# Patient Record
Sex: FEMALE | Race: WHITE | Employment: PART TIME | URBAN - METROPOLITAN AREA
[De-identification: names, ages, dates, MRNs, and addresses within clinical notes are randomized per-mention and may not be internally consistent; named-entity substitution may affect disease eponyms.]

---

## 2018-01-23 ENCOUNTER — OFFICE VISIT (OUTPATIENT)
Dept: ENDOCRINOLOGY | Age: 63
End: 2018-01-23

## 2018-01-23 VITALS
WEIGHT: 201.8 LBS | HEART RATE: 81 BPM | HEIGHT: 63 IN | DIASTOLIC BLOOD PRESSURE: 84 MMHG | BODY MASS INDEX: 35.75 KG/M2 | OXYGEN SATURATION: 98 % | SYSTOLIC BLOOD PRESSURE: 136 MMHG

## 2018-01-23 DIAGNOSIS — E55.9 VITAMIN D DEFICIENCY: ICD-10-CM

## 2018-01-23 DIAGNOSIS — N18.30 CKD (CHRONIC KIDNEY DISEASE) STAGE 3, GFR 30-59 ML/MIN (HCC): ICD-10-CM

## 2018-01-23 DIAGNOSIS — E66.9 OBESITY (BMI 35.0-39.9 WITHOUT COMORBIDITY): ICD-10-CM

## 2018-01-23 DIAGNOSIS — I10 ESSENTIAL HYPERTENSION: ICD-10-CM

## 2018-01-23 DIAGNOSIS — E78.2 MIXED HYPERLIPIDEMIA: ICD-10-CM

## 2018-01-23 PROCEDURE — 99214 OFFICE O/P EST MOD 30 MIN: CPT | Performed by: INTERNAL MEDICINE

## 2018-01-23 RX ORDER — PRAVASTATIN SODIUM 20 MG
20 TABLET ORAL DAILY
COMMUNITY
End: 2018-01-23

## 2018-01-23 RX ORDER — LATANOPROST 50 UG/ML
SOLUTION/ DROPS OPHTHALMIC
COMMUNITY
End: 2021-06-22

## 2018-01-23 RX ORDER — CHLORAL HYDRATE 500 MG
3000 CAPSULE ORAL 2 TIMES DAILY
COMMUNITY
End: 2018-04-24

## 2018-01-23 ASSESSMENT — PATIENT HEALTH QUESTIONNAIRE - PHQ9
1. LITTLE INTEREST OR PLEASURE IN DOING THINGS: 0
2. FEELING DOWN, DEPRESSED OR HOPELESS: 0
SUM OF ALL RESPONSES TO PHQ QUESTIONS 1-9: 0
SUM OF ALL RESPONSES TO PHQ9 QUESTIONS 1 & 2: 0

## 2018-01-23 NOTE — PROGRESS NOTES
patient. Past Surgical History:   Procedure Laterality Date    COLONOSCOPY       Social History     Social History    Marital status:      Spouse name: N/A    Number of children: N/A    Years of education: N/A     Occupational History    Not on file. Social History Main Topics    Smoking status: Never Smoker    Smokeless tobacco: Never Used    Alcohol use No    Drug use: No    Sexual activity: No     Other Topics Concern    Not on file     Social History Narrative    No narrative on file     Family History   Problem Relation Age of Onset    Diabetes Mother     Heart Attack Mother     Cancer Father     No Known Problems Daughter     No Known Problems Daughter      Current Outpatient Prescriptions   Medication Sig Dispense Refill    Dulaglutide 0.75 MG/0.5ML SOPN Inject 0.75 mg into the skin once a week      vitamin D (CHOLECALCIFEROL) 5000 units CAPS capsule Take 5,000 Units by mouth daily      metoprolol tartrate (LOPRESSOR) 25 MG tablet Take 25 mg by mouth daily      pravastatin (PRAVACHOL) 20 MG tablet Take 20 mg by mouth daily      latanoprost (XALATAN) 0.005 % ophthalmic solution place 1 Drop into each eye daily.  Omega-3 Fatty Acids (FISH OIL) 1000 MG CAPS Take 3,000 mg by mouth 2 times daily      insulin aspart (NOVOLOG) 100 UNIT/ML injection vial Inject 60 Units into the skin 1 Day Inject up to 60 units subcutaneously daily via insulin pump. 1 vial 3     No current facility-administered medications for this visit.       Allergies   Allergen Reactions    Ace Inhibitors      Other reaction(s): Cough     Family Status   Relation Status    Mother     Father     Daughter Alive    Daughter Alive       Review of Systems  Constitutional: no fatigue, no fever, no recent weight gain, no recent weight loss, no changes in appetite  Eyes: no eye pain, no change in vision, no eye redness, no eye irritation, no double vision  Ears, nose, throat: no nasal

## 2018-01-27 LAB
ALBUMIN SERPL-MCNC: 3 G/DL
ALP BLD-CCNC: 80 U/L
ALT SERPL-CCNC: 19 U/L
AST SERPL-CCNC: 14 U/L
AVERAGE GLUCOSE: ABNORMAL
BILIRUB SERPL-MCNC: 0.2 MG/DL (ref 0.1–1.4)
BUN BLDV-MCNC: 37 MG/DL
CALCIUM SERPL-MCNC: 8.3 MG/DL
CHLORIDE BLD-SCNC: 110 MMOL/L
CHOLESTEROL, TOTAL: 189 MG/DL
CHOLESTEROL/HDL RATIO: ABNORMAL
CO2: 23 MMOL/L
CREAT SERPL-MCNC: 3.21 MG/DL
GLUCOSE FASTING: ABNORMAL MG/DL
HBA1C MFR BLD: 7.1 %
HDLC SERPL-MCNC: 42 MG/DL (ref 35–70)
LDL CHOLESTEROL CALCULATED: 113 MG/DL (ref 0–160)
POTASSIUM SERPL-SCNC: 4.9 MMOL/L
PTH INTACT: 183
SODIUM BLD-SCNC: 142 MMOL/L
TOTAL PROTEIN: 6.4 G/DL (ref 6.4–8.2)
TRIGL SERPL-MCNC: 172 MG/DL
VITAMIN D 25-HYDROXY: 47
VITAMIN D2, 25 HYDROXY: NORMAL
VITAMIN D3,25 HYDROXY: NORMAL
VLDLC SERPL CALC-MCNC: ABNORMAL MG/DL

## 2018-04-24 ENCOUNTER — OFFICE VISIT (OUTPATIENT)
Dept: ENDOCRINOLOGY | Age: 63
End: 2018-04-24

## 2018-04-24 VITALS
WEIGHT: 197.2 LBS | BODY MASS INDEX: 36.29 KG/M2 | HEART RATE: 78 BPM | HEIGHT: 62 IN | SYSTOLIC BLOOD PRESSURE: 148 MMHG | DIASTOLIC BLOOD PRESSURE: 70 MMHG | OXYGEN SATURATION: 96 %

## 2018-04-24 DIAGNOSIS — E55.9 VITAMIN D DEFICIENCY: ICD-10-CM

## 2018-04-24 DIAGNOSIS — E66.9 OBESITY (BMI 35.0-39.9 WITHOUT COMORBIDITY): ICD-10-CM

## 2018-04-24 DIAGNOSIS — E78.2 MIXED HYPERLIPIDEMIA: ICD-10-CM

## 2018-04-24 DIAGNOSIS — N18.30 CKD (CHRONIC KIDNEY DISEASE) STAGE 3, GFR 30-59 ML/MIN (HCC): ICD-10-CM

## 2018-04-24 DIAGNOSIS — I10 ESSENTIAL HYPERTENSION: ICD-10-CM

## 2018-04-24 PROCEDURE — 99214 OFFICE O/P EST MOD 30 MIN: CPT | Performed by: INTERNAL MEDICINE

## 2018-04-24 RX ORDER — METOPROLOL TARTRATE 50 MG/1
50 TABLET, FILM COATED ORAL 2 TIMES DAILY
COMMUNITY

## 2018-04-24 RX ORDER — CHLORAL HYDRATE 500 MG
1000 CAPSULE ORAL 2 TIMES DAILY
Qty: 90 CAPSULE | Refills: 3 | COMMUNITY
Start: 2018-04-24 | End: 2019-04-09 | Stop reason: ALTCHOICE

## 2018-04-24 RX ORDER — AMLODIPINE BESYLATE 5 MG/1
5 TABLET ORAL DAILY
COMMUNITY

## 2018-04-24 ASSESSMENT — PATIENT HEALTH QUESTIONNAIRE - PHQ9
SUM OF ALL RESPONSES TO PHQ9 QUESTIONS 1 & 2: 0
SUM OF ALL RESPONSES TO PHQ QUESTIONS 1-9: 0
2. FEELING DOWN, DEPRESSED OR HOPELESS: 0
1. LITTLE INTEREST OR PLEASURE IN DOING THINGS: 0

## 2018-07-16 RX ORDER — DULAGLUTIDE 0.75 MG/.5ML
INJECTION, SOLUTION SUBCUTANEOUS
Qty: 6 ML | Refills: 0 | Status: SHIPPED | OUTPATIENT
Start: 2018-07-16 | End: 2018-10-02 | Stop reason: SDUPTHER

## 2018-07-25 ENCOUNTER — OFFICE VISIT (OUTPATIENT)
Dept: ENDOCRINOLOGY | Age: 63
End: 2018-07-25

## 2018-07-25 VITALS
HEIGHT: 62 IN | HEART RATE: 93 BPM | DIASTOLIC BLOOD PRESSURE: 62 MMHG | BODY MASS INDEX: 37.1 KG/M2 | SYSTOLIC BLOOD PRESSURE: 125 MMHG | WEIGHT: 201.6 LBS | OXYGEN SATURATION: 98 %

## 2018-07-25 DIAGNOSIS — E78.2 MIXED HYPERLIPIDEMIA: ICD-10-CM

## 2018-07-25 DIAGNOSIS — E55.9 VITAMIN D DEFICIENCY: ICD-10-CM

## 2018-07-25 DIAGNOSIS — N18.4 CKD (CHRONIC KIDNEY DISEASE) STAGE 4, GFR 15-29 ML/MIN (HCC): ICD-10-CM

## 2018-07-25 DIAGNOSIS — E66.9 OBESITY (BMI 35.0-39.9 WITHOUT COMORBIDITY): ICD-10-CM

## 2018-07-25 DIAGNOSIS — I10 ESSENTIAL HYPERTENSION: ICD-10-CM

## 2018-07-25 PROCEDURE — 99214 OFFICE O/P EST MOD 30 MIN: CPT | Performed by: INTERNAL MEDICINE

## 2018-07-25 RX ORDER — PARICALCITOL 1 UG/1
1 CAPSULE, LIQUID FILLED ORAL
COMMUNITY
Start: 2018-07-12 | End: 2021-06-22

## 2018-07-25 NOTE — PROGRESS NOTES
Itzel Killian is a 58 y.o. female who presents for Type 2 diabetes mellitus. Current symptoms/problems include hyperglycemia and show no change. 1.Type 2 diabetes mellitus, uncontrolled, with neuropathy ([facility] [address] [E11.40, E11.65]    Diagnosed with Type 2 diabetes mellitus in 1968.     Comorbid conditions: hyperlipidemia, CKD and Neuropathy    Current diabetic medications include: Novolog, Trulicity    Intolerance to diabetes medications: No     Weight trend: stable  Prior visit with dietician: yes  Current diet: on average, 3 meals per day  Current exercise: active     Current monitoring regimen: home blood tests - 4 times daily  Has brought blood glucose log/meter:  Yes  Home blood sugar records: fasting range:  and postprandial range:    Any episodes of hypoglycemia? Rare  Hypoglycemia frequency and time(s):  twice a month  Does patient have Glucagon emergency kit? No  Does patient have rapid acting carbohydrate? Yes  Does patient wear a medic alert bracelet or necklace? No    2. Mixed hyperlipidemia  Has muscle pain. 3. Obesity (BMI 35.0-39.9 without comorbidity)  Eats healthy. 4. Essential hypertension  No headaches. 5.2 Vitamin D deficiency  No bone pain. 6. CKD (chronic kidney disease) stage 4  NO urination problems. Lab Results   Component Value Date    LABA1C 7.1 01/27/2018     No results found for: EAG      IMPRESSION:    1.  Slight asymmetric enlargement of the right lobe of the thyroid gland as compared to the left lobe of the thyroid gland. There is a heterogeneous echotexture demonstrated in each lobe of the thyroid gland as well as along the isthmus. 2.  There are 3 nodules demonstrated in the right lobe of the thyroid gland which have not substantially changed in size compared to the previous ultrasound study. An additional nodule in the left lobe of the thyroid gland and along the isthmus have also not substantially changed in size.   Workstation [employer] Result Narrative   STUDY:  Ultrasound examination of the thyroid gland. CLINICAL:   Follow-up of thyroid gland nodules    COMPARISON:   2/1/2016    TECHNIQUE:   Ultrasound examination of the thyroid gland is performed with grayscale and with color Doppler. FINDINGS:     The right lobe of the thyroid gland measures 6.0 x 2.0 x 2.9 cm. On the previous ultrasound study the right lobe measured 5.3 x 2.2 x 2.8 cm. There is a diffusely heterogeneous echotexture in the right lobe of the thyroid gland. There is a well-defined hypoechoic nodule along the upper portion of the right lobe measuring 0.6 x 0.9 x 0.5 cm. There is a solid nodule in the midportion of the right lobe measuring 2.6 x 2.1 x 2.5 cm. This has slightly ill-defined margins. Inferiorly there is an additional solid nodule measuring 1.8 x 2.2 x 1.2 cm which may have a small amount of calcification along the wall. On the previous ultrasound study, the largest nodule in the midportion of the right lobe measured up to 2.8 cm and the nodule in the lower pole measured up to 2.4 cm indicating no substantial interval change in size. The left lobe of the thyroid gland currently measures 4.9 x 2.3 x 2.3 cm. On the previous ultrasound study the left lobe measured 5.4 x 2.8 x 2.6 cm. There is a heterogeneous echotexture in the left lobe of the thyroid gland. There is a solid nodule in the mid to lower portion of the left lobe measuring 1.7 x 2.8 x 1.5 cm. On the previous study this measured up to 2.9 cm indicating no substantial interval change. The isthmus measures 6 mm in thickness. There is a nodule along the anterior aspect of the isthmus measuring 1.0 x 0.8 x 0.8 cm. On the previous ultrasound this measured up to 1 cm indicating no significant interval change.          Past Medical History:   Diagnosis Date    Anemia     Eye pressure     Hypertension     Type 2 diabetes mellitus without complication [facility])       Patient Active Problem List Cough     Family Status   Relation Status    Mother     Father     Anastasiia Alive    Anastasiia Alive       Review of Systems  Constitutional: no fatigue, no fever, no recent weight gain, no recent weight loss, no changes in appetite  Eyes: no eye pain, no change in vision, no eye redness, no eye irritation, no double vision  Ears, nose, throat: no nasal congestion, no sore throat, no earache, no decrease in hearing, no hoarseness, no dry mouth, no sinus problems, no difficulty swallowing, no neck lumps, no dental problems, no mouth sores, no ringing in ears  Pulmonary: no shortness of breath, no wheezing, no dyspnea on exertion, no cough  Cardiovascular: no chest pain, no lower extremity edema, no orthopnea, no intermittent leg claudication, no palpitations  Gastrointestinal: no abdominal pain, no nausea, no vomiting, no diarrhea, no constipation, no dysphagia, no heartburn, no bloating  Genitourinary: no dysuria, no urinary incontinence, no urinary hesitancy, no urinary frequency, no feelings of urinary urgency, no nocturia  Musculoskeletal: no joint swelling, no joint stiffness, has joint pain, no muscle cramps, no muscle pain, no bone pain  Integument/Breast: no hair loss, no skin rashes, no skin lesions, no itching, no dry skin, no breast pain, no breast mass, no skin hives, no skin discoloration, no nipple discharge  Neurological: no numbness, no tingling, no weakness, no confusion, no headaches, no dizziness, no fainting, no tremors, no decrease in memory, has balance problems  Psychiatric: no anxiety, no depression, no insomnia  Hematologic/Lymphatic: no tendency for easy bleeding, no swollen lymph nodes, no tendency for easy bruising  Immunology: no seasonal allergies, no frequent infections, no frequent illnesses  Endocrine: no temperature intolerance     OBJECTIVE:  /62 (Site: Left Arm, Position: Sitting, Cuff Size: Large Adult)   Pulse 93   Ht 5' 2\" (1.575 m)   Wt 201 lb 9.6 oz (91.4 kg) Future  - Comprehensive Metabolic Panel; Future  - Hemoglobin A1C; Future    2. Mixed hyperlipidemia  Not taking pravastatin. Had muscle pain. - Lipid Panel; Future  Take fish oil  , now 98. Diet. 3. Obesity (BMI 35.0-39.9 without comorbidity)  Diet. 4. Essential hypertension  Current meds. 5. Vitamin D deficiency  Supplements. - Vitamin D 25 Hydroxy; Future    6. CKD (chronic kidney disease) stage 4  Nephrology follow up. See PCP for left leg swelling  Reviewed and/or ordered clinical lab results Yes  Reviewed and/or ordered radiology tests No  Reviewed and/or ordered other diagnostic tests No  Discussed test results with performing physician No  Independently reviewed image, tracing, or specimen Yes Insulin pump tracing, 9 pages of data. Recommend same rates and ratios. Made a decision to obtain old records Yes  Reviewed old records No  Obtained history from other than patient No    Comfort Montez was counseled regarding symptoms of diabetes diagnosis, course and complications of disease if inadequately treated, side effects of medications, diagnosis, treatment options, and prognosis, risks, benefits, complications, and alternatives of treatment, labs, imaging and other studies and treatment targets and goals. She understands instructions and counseling. Return in about 3 months (around 10/25/2018) for diabetes.

## 2018-08-10 ENCOUNTER — TELEPHONE (OUTPATIENT)
Dept: ENDOCRINOLOGY | Age: 63
End: 2018-08-10

## 2018-08-13 NOTE — TELEPHONE ENCOUNTER
I called [facility] and received only recorder, left a voice mail. It stated that they call us back. However, they wanted me to leave reference number, which I don't see in this communication. Please let me know the reference number or call them to provide them with it. Thank you.

## 2018-10-03 RX ORDER — DULAGLUTIDE 0.75 MG/.5ML
INJECTION, SOLUTION SUBCUTANEOUS
Qty: 6 ML | Refills: 0 | Status: SHIPPED | OUTPATIENT
Start: 2018-10-03 | End: 2018-10-15 | Stop reason: SDUPTHER

## 2018-10-16 RX ORDER — DULAGLUTIDE 0.75 MG/.5ML
INJECTION, SOLUTION SUBCUTANEOUS
Qty: 6 ML | Refills: 0 | Status: SHIPPED | OUTPATIENT
Start: 2018-10-16 | End: 2019-04-09 | Stop reason: SDUPTHER

## 2018-11-14 ENCOUNTER — OFFICE VISIT (OUTPATIENT)
Dept: ENDOCRINOLOGY | Age: 63
End: 2018-11-14
Payer: COMMERCIAL

## 2018-11-14 VITALS
HEIGHT: 62 IN | WEIGHT: 204.4 LBS | BODY MASS INDEX: 37.61 KG/M2 | DIASTOLIC BLOOD PRESSURE: 68 MMHG | SYSTOLIC BLOOD PRESSURE: 132 MMHG

## 2018-11-14 DIAGNOSIS — E66.9 OBESITY (BMI 35.0-39.9 WITHOUT COMORBIDITY): ICD-10-CM

## 2018-11-14 DIAGNOSIS — N18.30 CKD (CHRONIC KIDNEY DISEASE) STAGE 3, GFR 30-59 ML/MIN (HCC): ICD-10-CM

## 2018-11-14 DIAGNOSIS — E78.2 MIXED HYPERLIPIDEMIA: ICD-10-CM

## 2018-11-14 DIAGNOSIS — E55.9 VITAMIN D DEFICIENCY: ICD-10-CM

## 2018-11-14 DIAGNOSIS — I10 ESSENTIAL HYPERTENSION: ICD-10-CM

## 2018-11-14 PROCEDURE — 99215 OFFICE O/P EST HI 40 MIN: CPT | Performed by: INTERNAL MEDICINE

## 2018-11-14 RX ORDER — PRAVASTATIN SODIUM 20 MG
20 TABLET ORAL DAILY
Qty: 30 TABLET | Refills: 3 | Status: SHIPPED | OUTPATIENT
Start: 2018-11-14 | End: 2021-09-28

## 2018-11-14 RX ORDER — FENOFIBRATE 145 MG/1
145 TABLET, COATED ORAL DAILY
Qty: 30 TABLET | Refills: 3 | Status: SHIPPED | OUTPATIENT
Start: 2018-11-14 | End: 2018-12-13 | Stop reason: SINTOL

## 2018-11-14 RX ORDER — FUROSEMIDE 80 MG
160 TABLET ORAL 2 TIMES DAILY
COMMUNITY

## 2018-12-13 ENCOUNTER — TELEPHONE (OUTPATIENT)
Dept: ENDOCRINOLOGY | Age: 63
End: 2018-12-13

## 2018-12-13 RX ORDER — ICOSAPENT ETHYL 1000 MG/1
2 CAPSULE ORAL 2 TIMES DAILY
Qty: 120 CAPSULE | Refills: 3 | Status: SHIPPED | OUTPATIENT
Start: 2018-12-13 | End: 2019-04-09 | Stop reason: ALTCHOICE

## 2018-12-13 NOTE — TELEPHONE ENCOUNTER
No good alternative. There is gemfibrozil with more problems and interanctions. Recommend vascepa. Please document side effects in allergy section.

## 2018-12-13 NOTE — TELEPHONE ENCOUNTER
Spoke with patient and advised of medication change and to call if there are any side effects. Patient stated her understanding.

## 2019-01-08 RX ORDER — PERPHENAZINE 16 MG/1
1 TABLET, FILM COATED ORAL 4 TIMES DAILY
Qty: 375 STRIP | Refills: 3 | Status: SHIPPED | OUTPATIENT
Start: 2019-01-08 | End: 2019-01-29 | Stop reason: SDUPTHER

## 2019-01-29 ENCOUNTER — TELEPHONE (OUTPATIENT)
Dept: ENDOCRINOLOGY | Age: 64
End: 2019-01-29

## 2019-01-29 RX ORDER — PERPHENAZINE 16 MG/1
1 TABLET, FILM COATED ORAL 4 TIMES DAILY
Qty: 375 STRIP | Refills: 3 | Status: SHIPPED | OUTPATIENT
Start: 2019-01-29 | End: 2022-04-05 | Stop reason: SDUPTHER

## 2019-04-08 PROBLEM — E11.40 TYPE 2 DIABETES, CONTROLLED, WITH NEUROPATHY (HCC): Status: ACTIVE | Noted: 2018-01-23

## 2019-04-09 ENCOUNTER — OFFICE VISIT (OUTPATIENT)
Dept: ENDOCRINOLOGY | Age: 64
End: 2019-04-09
Payer: COMMERCIAL

## 2019-04-09 VITALS
HEART RATE: 76 BPM | SYSTOLIC BLOOD PRESSURE: 113 MMHG | WEIGHT: 202.8 LBS | HEIGHT: 62 IN | BODY MASS INDEX: 37.32 KG/M2 | DIASTOLIC BLOOD PRESSURE: 66 MMHG

## 2019-04-09 DIAGNOSIS — E55.9 VITAMIN D DEFICIENCY: ICD-10-CM

## 2019-04-09 DIAGNOSIS — I10 ESSENTIAL HYPERTENSION: ICD-10-CM

## 2019-04-09 DIAGNOSIS — E11.40 TYPE 2 DIABETES, CONTROLLED, WITH NEUROPATHY (HCC): Primary | ICD-10-CM

## 2019-04-09 DIAGNOSIS — N18.5 CKD (CHRONIC KIDNEY DISEASE) STAGE 5, GFR LESS THAN 15 ML/MIN (HCC): ICD-10-CM

## 2019-04-09 DIAGNOSIS — E78.2 MIXED HYPERLIPIDEMIA: ICD-10-CM

## 2019-04-09 DIAGNOSIS — E66.9 OBESITY (BMI 35.0-39.9 WITHOUT COMORBIDITY): ICD-10-CM

## 2019-04-09 PROCEDURE — 99214 OFFICE O/P EST MOD 30 MIN: CPT | Performed by: INTERNAL MEDICINE

## 2019-04-09 RX ORDER — HYDRALAZINE HYDROCHLORIDE 50 MG/1
1 TABLET, FILM COATED ORAL 2 TIMES DAILY
COMMUNITY

## 2019-04-09 NOTE — PROGRESS NOTES
Pia Rosado is a 61 y.o. female who presents for Type 2 diabetes mellitus. Current symptoms/problems include hyperglycemia and show no change. 1.  Type 2 diabetes, controlled, with neuropathy ([address] [E11.40]    Diagnosed with Type 2 diabetes mellitus in 1968.     Comorbid conditions: hyperlipidemia, CKD and Neuropathy    Current diabetic medications include: Novolog, Trulicity    Intolerance to diabetes medications: No     Weight trend: stable  Prior visit with dietician: yes  Current diet: on average, 3 meals per day  Current exercise: active     Current monitoring regimen: home blood tests - 4 times daily  Has brought blood glucose log/meter:  Yes  Home blood sugar records: fasting range:  and postprandial range:    Any episodes of hypoglycemia? Rare  Hypoglycemia frequency and time(s):  twice a month  Does patient have Glucagon emergency kit? No  Does patient have rapid acting carbohydrate? Yes  Does patient wear a medic alert bracelet or necklace? No    2. Mixed hyperlipidemia  Has muscle pain. 3. Obesity (BMI 35.0-39.9 without comorbidity)  Eats healthy. 4. Essential hypertension  No headaches. 5. Vitamin D deficiency  No bone pain. 6. CKD (chronic kidney disease) stage 5  No urination problems. Lab Results   Component Value Date    LABA1C 7.1 01/27/2018     No results found for: EAG      IMPRESSION:    1.  Slight asymmetric enlargement of the right lobe of the thyroid gland as compared to the left lobe of the thyroid gland. There is a heterogeneous echotexture demonstrated in each lobe of the thyroid gland as well as along the isthmus. 2.  There are 3 nodules demonstrated in the right lobe of the thyroid gland which have not substantially changed in size compared to the previous ultrasound study. An additional nodule in the left lobe of the thyroid gland and along the isthmus have also not substantially changed in size.   Workstation VR:[id number]   Result Narrative Diagnosis    Type 2 diabetes, controlled, with neuropathy ([facility] [address]    Mixed hyperlipidemia    Obesity (BMI 35.0-39.9 without comorbidity)    Essential hypertension    Vitamin D deficiency    CKD (chronic kidney disease) stage 5, GFR less than 15 ml/min ([facility])     Past Surgical History:   Procedure Laterality Date    COLONOSCOPY       Social History     Socioeconomic History    Marital status:      Spouse name: Not on file    Number of children: Not on file    Years of education: Not on file    Highest education level: Not on file   Occupational History    Not on file   Social Needs    Financial resource strain: Not on file    Food insecurity:     Worry: Not on file     Inability: Not on file    Transportation needs:     Medical: Not on file     Non-medical: Not on file   Tobacco Use    Smoking status: Never Smoker    Smokeless tobacco: Never Used   Substance and Sexual Activity    Alcohol use: No    Drug use: No    Sexual activity: Never   Lifestyle    Physical activity:     Days per week: Not on file     Minutes per session: Not on file    Stress: Not on file   Relationships    Social connections:     Talks on phone: Not on file     Gets together: Not on file     Attends Anabaptist service: Not on file     Active member of club or organization: Not on file     Attends meetings of clubs or organizations: Not on file     Relationship status: Not on file    Intimate partner violence:     Fear of current or ex partner: Not on file     Emotionally abused: Not on file     Physically abused: Not on file     Forced sexual activity: Not on file   Other Topics Concern    Not on file   Social History Narrative    Not on file     Family History   Problem Relation Age of Onset    Diabetes Mother     Heart Attack Mother     Cancer Father     No Known Problems Daughter     No Known Problems Daughter      Current Outpatient Medications   Medication Sig Dispense Refill    insulin aspart (Malorie Deleon) palpitations  Gastrointestinal: no abdominal pain, no nausea, no vomiting, no diarrhea, no constipation, no dysphagia, no heartburn, no bloating  Genitourinary: no dysuria, no urinary incontinence, no urinary hesitancy, no urinary frequency, no feelings of urinary urgency, no nocturia  Musculoskeletal: no joint swelling, no joint stiffness, has joint pain, no muscle cramps, no muscle pain, no bone pain  Integument/Breast: no hair loss, no skin rashes, no skin lesions, no itching, no dry skin, no breast pain, no breast mass, no skin hives, no skin discoloration, no nipple discharge  Neurological: no numbness, no tingling, no weakness, no confusion, no headaches, no dizziness, no fainting, no tremors, no decrease in memory, has balance problems  Psychiatric: no anxiety, no depression, no insomnia  Hematologic/Lymphatic: no tendency for easy bleeding, no swollen lymph nodes, no tendency for easy bruising  Immunology: no seasonal allergies, no frequent infections, no frequent illnesses  Endocrine: no temperature intolerance     OBJECTIVE:  /66 (Site: Left Upper Arm, Position: Sitting, Cuff Size: Medium Adult)   Pulse 76   Ht 5' 2\" (1.575 m)   Wt 202 lb 12.8 oz (92 kg)   BMI 37.09 kg/m²      Wt Readings from Last 3 Encounters:   04/09/19 202 lb 12.8 oz (92 kg)   11/14/18 204 lb 6.4 oz (92.7 kg)   07/25/18 201 lb 9.6 oz (91.4 kg)         Constitutional: no acute distress, well appearing and well nourished  Psychiatric: oriented to person, place and time, judgement and insight and normal, recent and remote memory and intact and mood and affect are normal  Skin: skin and subcutaneous tissue is normal without mass, normal turgor  Head and Face: examination of head and face revealed no abnormalities  Eyes: no lid or conjunctival swelling, erythema or discharge, pupils are normal, equal, round, reactive to light  Ears/Nose: external inspection of ears and nose revealed no abnormalities, hearing is grossly normal  Oropharynx/Mouth/Face: lips, tongue and gums are normal with no lesions, the voice quality was normal  Neck: neck is supple and symmetric, with midline trachea and no masses, thyroid is normal  Lymphatics: normal cervical lymph nodes, normal supraclavicular nodes  Pulmonary: no increased work of breathing or signs of respiratory distress, lungs are clear to auscultation  Cardiovascular: normal heart rate and rhythm, normal S1 and S2, no murmurs and pedal pulses and 2+ bilaterally, has edema  Abdomen: abdomen is soft, non-tender with no masses  Musculoskeletal: normal gait and station and exam of the digits and nails are normal  Neurological: normal coordination and normal general cortical function    Visual inspection:  Deformity/amputation: absent  Skin lesions/pre-ulcerative calluses: absent  Edema: right- 1+, left- 1+     Sensory exam:  Monofilament sensation: normal  (minimum of 5 random plantar locations tested, avoiding callused areas - > 1 area with absence of sensation is + for neuropathy)     Plus at least one of the following:  Pulses: normal  Proprioception: Intact  Vibration (128 Hz): Impaired      ASSESSMENT/PLAN:  1. Type 2 diabetes mellitus, controlled, with neuropathy (H[facility])  Hemoglobin A1c 6.6  Same rates and ratios in pump  - insulin aspart (NOVOLOG) 100 UNIT/ML injection vial; Inject up to 60 units subcutaneously daily via insulin pump. Dispense: 2 vial; Refill: 3  - Dulaglutide 0.75 MG/0.5ML SOPN; Inject 0.75 mg into the skin once a week  Dispense: 12 pen; Refill: 1  - Comprehensive Metabolic Panel; Future  - Hemoglobin A1C; Future    2. Mixed hyperlipidemia    LDL cholesterol 79  Had muscle pain. Will start again. Start fenofibrate.  - Lipid Panel; Future  Take fish oil  , 98, 106, 79. Diet. 3. Obesity (BMI 35.0-39.9 without comorbidity)  Diet. 4. Essential hypertension  Current meds. 5. Vitamin D deficiency  25 hydroxy vitamin D 62  Decrease supplement to qod.   - Vitamin D 25 Hydroxy; Future    6. CKD (chronic kidney disease) stage 5  Nephrology follow up. Reviewed and/or ordered clinical lab results Yes  Reviewed and/or ordered radiology tests No  Reviewed and/or ordered other diagnostic tests No  Discussed test results with performing physician No  Independently reviewed image, tracing, or specimen Yes Insulin pump tracing, 6 pages of data. Recommend same rates and ratios. Check BG 4 times daily. Made a decision to obtain old records Yes  Reviewed old records No  Obtained history from other than patient No    Comfort Montez was counseled regarding symptoms of diabetes diagnosis, course and complications of disease if inadequately treated, side effects of medications, diagnosis, treatment options, and prognosis, risks, benefits, complications, and alternatives of treatment, labs, imaging and other studies and treatment targets and goals. She understands instructions and counseling. Total visit time 40 min, >50% was spent in counseling. Return in about 3 months (around 7/9/2019) for diabetes.

## 2019-08-05 ENCOUNTER — TELEPHONE (OUTPATIENT)
Dept: ENDOCRINOLOGY | Age: 64
End: 2019-08-05

## 2019-09-18 ENCOUNTER — TELEPHONE (OUTPATIENT)
Dept: ENDOCRINOLOGY | Age: 64
End: 2019-09-18

## 2019-11-04 ENCOUNTER — TELEPHONE (OUTPATIENT)
Dept: ENDOCRINOLOGY | Age: 64
End: 2019-11-04

## 2019-11-25 ENCOUNTER — OFFICE VISIT (OUTPATIENT)
Dept: ENDOCRINOLOGY | Age: 64
End: 2019-11-25
Payer: COMMERCIAL

## 2019-11-25 VITALS
DIASTOLIC BLOOD PRESSURE: 80 MMHG | HEART RATE: 78 BPM | OXYGEN SATURATION: 100 % | BODY MASS INDEX: 36.99 KG/M2 | WEIGHT: 201 LBS | SYSTOLIC BLOOD PRESSURE: 139 MMHG | HEIGHT: 62 IN

## 2019-11-25 DIAGNOSIS — E11.40 TYPE 2 DIABETES, CONTROLLED, WITH NEUROPATHY (HCC): Primary | ICD-10-CM

## 2019-11-25 DIAGNOSIS — E78.2 MIXED HYPERLIPIDEMIA: ICD-10-CM

## 2019-11-25 DIAGNOSIS — E66.9 OBESITY (BMI 35.0-39.9 WITHOUT COMORBIDITY): ICD-10-CM

## 2019-11-25 DIAGNOSIS — E55.9 VITAMIN D DEFICIENCY: ICD-10-CM

## 2019-11-25 DIAGNOSIS — E04.2 MULTINODULAR GOITER: ICD-10-CM

## 2019-11-25 DIAGNOSIS — E11.21 CONTROLLED TYPE 2 DIABETES MELLITUS WITH DIABETIC NEPHROPATHY, WITH LONG-TERM CURRENT USE OF INSULIN (HCC): ICD-10-CM

## 2019-11-25 DIAGNOSIS — Z79.4 CONTROLLED TYPE 2 DIABETES MELLITUS WITH DIABETIC NEPHROPATHY, WITH LONG-TERM CURRENT USE OF INSULIN (HCC): ICD-10-CM

## 2019-11-25 DIAGNOSIS — I10 ESSENTIAL HYPERTENSION: ICD-10-CM

## 2019-11-25 DIAGNOSIS — N18.5 CKD (CHRONIC KIDNEY DISEASE) STAGE 5, GFR LESS THAN 15 ML/MIN (HCC): ICD-10-CM

## 2019-11-25 PROCEDURE — 99215 OFFICE O/P EST HI 40 MIN: CPT | Performed by: INTERNAL MEDICINE

## 2020-02-27 ENCOUNTER — OFFICE VISIT (OUTPATIENT)
Dept: ENDOCRINOLOGY | Age: 65
End: 2020-02-27
Payer: COMMERCIAL

## 2020-02-27 VITALS
HEIGHT: 62 IN | HEART RATE: 86 BPM | BODY MASS INDEX: 37.02 KG/M2 | SYSTOLIC BLOOD PRESSURE: 131 MMHG | OXYGEN SATURATION: 97 % | WEIGHT: 201.2 LBS | DIASTOLIC BLOOD PRESSURE: 83 MMHG

## 2020-02-27 PROCEDURE — 99214 OFFICE O/P EST MOD 30 MIN: CPT | Performed by: INTERNAL MEDICINE

## 2020-02-27 NOTE — PROGRESS NOTES
Tao Guardado is a 59 y.o. female who presents for Type 2 diabetes mellitus. Current symptoms/problems include hyperglycemia and show no change. 1.  Type 2 diabetes, controlled, with neuropathy ([address] [E11.40]    Diagnosed with Type 2 diabetes mellitus in 1968.     Comorbid conditions: hyperlipidemia, CKD and Neuropathy    Current diabetic medications include: Novolog, Trulicity    Intolerance to diabetes medications: No     Weight trend: stable  Prior visit with dietician: yes  Current diet: on average, 3 meals per day  Current exercise: active     Current monitoring regimen: home blood tests - 4 times daily  Has brought blood glucose log/meter:  Yes  Home blood sugar records: fasting range:  and postprandial range:    Any episodes of hypoglycemia? Rare  Hypoglycemia frequency and time(s):  twice a month  Does patient have Glucagon emergency kit? No  Does patient have rapid acting carbohydrate? Yes  Does patient wear a medic alert bracelet or necklace? No    2. Mixed hyperlipidemia  Has muscle pain. 3. Obesity (BMI 35.0-39.9 without comorbidity)  Eats healthy. 4. Essential hypertension  No headaches. 5. Vitamin D deficiency  No bone pain. 6. CKD (chronic kidney disease) stage 5  No urination problems. 7. MNG  NO difficulty swallowing, voice change, history of radiation, thyroid cancer in family. 8. Nephropathy  No urination problems. ULTRASOUND THYROID, 2/21/2020 12:06 PM.       INDICATION: Thyroid nodule. Goiter.       COMPARISON: Ultrasound thyroid, 10/5/2017.       TECHNIQUE: Multiple transverse and longitudinal gray scale images were obtained through the    thyroid gland.       FINDINGS:        Right lobe measures 2.2 x 2.5 x 5.3 cm. Left lobe measures 2.0 x 2.6 x 5.3 cm. Isthmus is 13 mm    in thickness. Thyroid gland is heterogeneous in echotexture throughout.  There is    redemonstration of multiple nodules within the bilateral lobes, numbering at least 5.     fluid.  8 prepared slides 4 airdried, 4 fixed    ______________________________________________________________________                                   CPT Code(s): 1: T3046131, 50612, 16390(5)           CYTOLOGY REPORT  ______________________________________________________________________  Patient Name: Brandin Velasco    Accession #:        Location: [facility] Rec. #:  [id number]  Account [de-identified]  Soc. Sec. #:         : 1955 (Age: 60)   Gender: F  Physician(s): Kaylan Delaney M.D. Collected: 3/1/2016  Received:  3/1/2016  ______________________________________________________________________  Source of Specimen(s)  . Thyroid, Fine Needle Aspiration, Left mid    Clinical History  Pertinent Clinical History:   Other Clinical Conditions:    Clinical Diagnosis:  Thyroid nodules    ______________________________________________________________________   FINAL CYTOLGIC DIAGNOSIS     4. Thyroid, Fine Needle Aspiration, Left mid:       Satisfactory for evaluation.         Benign. Colloid nodule. .               ______________________________________________________________________  Cytotechnologist: DL    Electronically Jazmin Bernarda Moreno MD  Pathologist  3/2/2016      Procedures/Addenda  FNA Immediate Specimen Evaluation     Date Ordered:     3/1/2016      Status: Signed Out        Date Complete:     3/1/2016     By: Chris Davis       Date Reported:     3/2/2016         Preliminary Diagnosis  - Thyroid epithelial cells and colloid sufficient for evaluation. Results-Comments             Lyric Montgomery. Melani Moreno MD        Gross Description  Received in Cytolyt.  30ml red, cloudy fluid.   8 prepared slides 4 airdried, 4 fixed    ______________________________________________________________________                                   CPT Code(s): 1: K0663664, V8479416, [id number])           Lab Results   Component Value Date    LABA1C 6.7 (H) 2020     No results in the mid to lower portion of the left lobe measuring 1.7 x 2.8 x 1.5 cm. On the previous study this measured up to 2.9 cm indicating no substantial interval change. The isthmus measures 6 mm in thickness. There is a nodule along the anterior aspect of the isthmus measuring 1.0 x 0.8 x 0.8 cm. On the previous ultrasound this measured up to 1 cm indicating no significant interval change.          Past Medical History:   Diagnosis Date    Anemia     Eye pressure     Hypertension     Kidney failure     Type 2 diabetes mellitus without complication (HCC)       Patient Active Problem List   Diagnosis    Type 2 diabetes, controlled, with neuropathy ([facility] [address]    Mixed hyperlipidemia    Obesity (BMI 35.0-39.9 without comorbidity)    Essential hypertension    Vitamin D deficiency    CKD (chronic kidney disease) stage 5, GFR less than 15 ml/min (HCC)    Multinodular goiter    Controlled type 2 diabetes mellitus with diabetic nephropathy (HCC)     Past Surgical History:   Procedure Laterality Date    COLONOSCOPY       Social History     Socioeconomic History    Marital status:      Spouse name: Not on file    Number of children: Not on file    Years of education: Not on file    Highest education level: Not on file   Occupational History    Not on file   Social Needs    Financial resource strain: Not on file    Food insecurity:     Worry: Not on file     Inability: Not on file    Transportation needs:     Medical: Not on file     Non-medical: Not on file   Tobacco Use    Smoking status: Never Smoker    Smokeless tobacco: Never Used   Substance and Sexual Activity    Alcohol use: No    Drug use: No    Sexual activity: Never   Lifestyle    Physical activity:     Days per week: Not on file     Minutes per session: Not on file    Stress: Not on file   Relationships    Social connections:     Talks on phone: Not on file     Gets together: Not on file     Attends [employer] service: Not on file units subcutaneously daily via insulin pump. Dispense: 2 vial; Refill: 3  - Dulaglutide 0.75 MG/0.5ML SOPN; Inject 1.5 mg into the skin once a week  Dispense: 12 pen; Refill: 1  - Comprehensive Metabolic Panel; Future  - Hemoglobin A1C; Future    2. Mixed hyperlipidemia    LDL cholesterol 79-61-64  Had muscle pain. Will start again. Start fenofibrate.  - Lipid Panel; Future  Take fish oil  , 98, 106, 79. Diet. 3. Obesity (BMI 35.0-39.9 without comorbidity)  Diet. 4. Essential hypertension  Current meds. 5. Vitamin D deficiency  25 hydroxy vitamin D 62-47-40  Vitamin D 25 Hydroxy; Future    6. CKD (chronic kidney disease) stage 5  Nephrology follow up. 7.  MNG  TSH 2.0  FNA of the right 2.6 cm nodule, left 2.4 cm nodules, increase in size  - Thyroid sonogram    8. Nephropathy  Follow with Nephrology. Reviewed and/or ordered clinical lab results Yes  Reviewed and/or ordered radiology tests No  Reviewed and/or ordered other diagnostic tests No  Discussed test results with performing physician No  Independently reviewed image, tracing, or specimen Yes Insulin pump tracing, 6 pages of data. Recommend same rates and ratios. Check BG 4 times daily. Made a decision to obtain old records Yes  Reviewed old records No  Obtained history from other than patient No    Comfort Tc was counseled regarding symptoms of diabetes diagnosis, course and complications of disease if inadequately treated, side effects of medications, diagnosis, treatment options, and prognosis, risks, benefits, complications, and alternatives of treatment, labs, imaging and other studies and treatment targets and goals, thyroid cancer risk. She understands instructions and counseling. Total visit time 40 min,>50% was counseling time      Return in about 3 months (around 5/27/2020) for diabetes.

## 2020-03-26 ENCOUNTER — TELEPHONE (OUTPATIENT)
Dept: ENDOCRINOLOGY | Age: 65
End: 2020-03-26

## 2020-03-26 NOTE — TELEPHONE ENCOUNTER
Pt was supposed to take the Trulicity on Tuesday and forgot to take it. Pt would like to know if she should take it today or just wait and resume her usual dose on Tuesday of next week.

## 2020-03-26 NOTE — TELEPHONE ENCOUNTER
If a dose is missed, administer as soon as possible within 3 days after the missed dose; dosing can then be resumed on the usual day of administration. Informed patient.

## 2020-05-21 ENCOUNTER — VIRTUAL VISIT (OUTPATIENT)
Dept: ENDOCRINOLOGY | Age: 65
End: 2020-05-21
Payer: COMMERCIAL

## 2020-05-21 PROCEDURE — 99214 OFFICE O/P EST MOD 30 MIN: CPT | Performed by: INTERNAL MEDICINE

## 2020-05-21 PROCEDURE — 3051F HG A1C>EQUAL 7.0%<8.0%: CPT | Performed by: INTERNAL MEDICINE

## 2020-05-21 NOTE — PROGRESS NOTES
Talha Delarosa is a 59 y.o. female who presents for Type 2 diabetes mellitus.     2020    TELEHEALTH EVALUATION -- Audio/Visual (During REMYC-34 public health emergency)    Patient provided verbal consent to use the video visit. HPI:    Talha Delarosa (:  1955) has requested an audio/video evaluation for the following concern(s):      Current symptoms/problems include hyperglycemia and show no change. 1.  DM type 2, uncontrolled, with neuropathy ([facility] [address] [E11.40, E11.65]    Diagnosed with Type 2 diabetes mellitus in .     Comorbid conditions: hyperlipidemia, CKD and Neuropathy    Current diabetic medications include: Novolog, Trulicity  On Med[employer] Minimed insulin pump  Intolerance to diabetes medications: No     Weight trend: stable  Prior visit with dietician: yes  Current diet: on average, 3 meals per day  Current exercise: active     Current monitoring regimen: home blood tests - 4 times daily  Has brought blood glucose log/meter:  Yes  Home blood sugar records: fasting range:  and postprandial range:    Any episodes of hypoglycemia? Rare  Hypoglycemia frequency and time(s):  twice a month  Does patient have Glucagon emergency kit? No  Does patient have rapid acting carbohydrate? Yes  Does patient wear a medic alert bracelet or necklace? No    2. Mixed hyperlipidemia  Has muscle pain. 3. Obesity (BMI 35.0-39.9 without comorbidity)  Eats healthy. 4. Essential hypertension  No headaches. 5. Vitamin D deficiency  No bone pain. 6. CKD (chronic kidney disease) stage 5  No urination problems. 7. MNG  N0 difficulty swallowing, voice change, history of radiation, thyroid cancer in family. 8. Nephropathy  No urination problems. ULTRASOUND THYROID, 2020 12:06 PM.       INDICATION: Thyroid nodule.  Goiter.       COMPARISON: Ultrasound thyroid, 10/5/2017.       TECHNIQUE: Multiple transverse and longitudinal gray scale images were obtained through the thyroiditis.              ______________________________________________________________________  Cytotechnologist: DL    Electronically Roxy Bynum. Mesfin Cooley MD  Pathologist  3/2/2016      Procedures/Addenda  FNA Immediate Specimen Evaluation     Date Ordered:     3/1/2016      Status: Signed Out        Date Complete:     3/1/2016     By: Marcelino Reyes       Date Reported:     3/2/2016         Preliminary Diagnosis  - Thyroid epithelial cells and colloid sufficient for evaluation. Results-Comments             Dary Norman. Mesfin Cooley MD        Gross Description  Received in Cytolyt.  30ml red, cloudy fluid. 8 prepared slides 4 airdried, 4 fixed    ______________________________________________________________________                                   CPT Code(s): 1: V6274467, W046458, [id number])           CYTOLOGY REPORT  ______________________________________________________________________  Patient Name: Scott Barnard    Accession #:        Location: [facility]     Med. Rec. #:  [id number]  Account [de-identified]  Soc. Sec. #:         : 1955 (Age: 60)   Gender: F  Physician(s): Dori Del Cid M.D. Collected: 3/1/2016  Received:  3/1/2016  ______________________________________________________________________  Source of Specimen(s)  3 of 4. Thyroid, Fine Needle Aspiration, Isthmus    Clinical History  Pertinent Clinical History:   Other Clinical Conditions:    Clinical Diagnosis:  Thyroid nodules    ______________________________________________________________________   FINAL CYTOLGIC DIAGNOSIS    3 of 4. Thyroid, Fine Needle Aspiration, Isthmus:       Satisfactory for evaluation.         Benign. Colloid nodule.                 ______________________________________________________________________  Cytotechnologist: DL    Electronically Skippy Misa.  Mesfin Cooley MD  Pathologist  3/2/2016      Procedures/Addenda  FNA Immediate Specimen Evaluation     Date Ordered:     3/1/2016      Status: Signed Out        Date Complete:     3/1/2016     By: Aamir Headley       Date Reported:     3/2/2016         Preliminary Diagnosis  - Thyroid epithelial cells and colloid sufficient for evaluation. Results-Comments             Lilla Bernheim. Jassi Hollis MD        Gross Description  Received in Cytolyt.  30ml red, cloudy fluid. 8 prepared slides 4 airdried, 4 fixed    ______________________________________________________________________                                   CPT Code(s): 1: F4397464, S9313186, [id number])           CYTOLOGY REPORT  ______________________________________________________________________  Patient Name: Keara Flores    Accession #:        [facility] Rec[facility] #[facility]  [id number]  Account [de-identified]  Soc. Sec. #:         : 1955 (Age: 60)   Gender: F  Physician(s): Alicia Gilliam M.D. Collected: 3/1/2016  Received:  3/1/2016  ______________________________________________________________________  Source of Specimen(s)  4 of 4. Thyroid, Fine Needle Aspiration, Left mid    Clinical History  Pertinent Clinical History:   Other Clinical Conditions:    Clinical Diagnosis:  Thyroid nodules    ______________________________________________________________________   FINAL CYTOLGIC DIAGNOSIS    4 of 4. Thyroid, Fine Needle Aspiration, Left mid:       Satisfactory for evaluation.         Benign. Colloid nodule. .               ______________________________________________________________________  Cytotechnologist: SHRUTI    Electronically Alex Miller. Jassi Hollis MD  Pathologist  3/2/2016      Procedures/Addenda  FNA Immediate Specimen Evaluation     Date Ordered:     3/1/2016      Status: Signed Out        Date Complete:     3/1/2016     By: Aamir Headley       Date Reported:     3/2/2016         Preliminary Diagnosis  - Thyroid epithelial cells and colloid sufficient for evaluation. Results-Comments             Lilla Bernheim.  Ceci,

## 2020-06-15 NOTE — TELEPHONE ENCOUNTER
Requested Prescriptions     Pending Prescriptions Disp Refills    insulin aspart (NOVOLOG) 100 UNIT/ML injection vial [Pharmacy Med Name: INSULIN ASPART 100/ML INJ,10ML] 20 mL 2     Sig: ADMINISTER UP TO 60 UNITS VIA INSULIN PUMP DAILY     Last OV 5/21/20  Next OV 8/24/20

## 2020-06-18 ENCOUNTER — TELEPHONE (OUTPATIENT)
Dept: ENDOCRINOLOGY | Age: 65
End: 2020-06-18

## 2020-06-18 NOTE — TELEPHONE ENCOUNTER
Plan will cover Humalog. Please send RX to [facility] for now, plans to use mail order in the future.

## 2020-07-06 ENCOUNTER — TELEPHONE (OUTPATIENT)
Dept: ENDOCRINOLOGY | Age: 65
End: 2020-07-06

## 2020-07-07 ENCOUNTER — TELEPHONE (OUTPATIENT)
Dept: ENDOCRINOLOGY | Age: 65
End: 2020-07-07

## 2020-07-07 NOTE — TELEPHONE ENCOUNTER
I spoke with patient. Before dinner blood glucose was elevated 299. Patient has a lot of pain. She stopped taking her steroid and muscle relaxant yesterday. Recommended to change insulin to carb ratio to 1:7.  Continue same other settings. Add extra insulin correction with the syringe injections as follows: 175 to 200-1 unit, 200-225- 2 units, 226-250-3 units, 251-275 -4 units, 276 to 300-5 units, more than 301- 6 units. Patient will call tomorrow with blood glucose levels in the afternoon.

## 2020-07-07 NOTE — TELEPHONE ENCOUNTER
BG before dinner 206. Patient eats healthy. Patient still has pain. Change settings as follows: Basal rate 00: 00-0.8 units/h, 12 AM 1.4 units/h, 4 PM 1.4 units/h, 7 PM 1.6 units/h. Change insulin to carb ratio 1: 6. Change sensitivity to 20. Change target 130. Same correction with syringe as discussed yesterday. Patient will call tomorrow with blood glucose.

## 2020-10-02 NOTE — TELEPHONE ENCOUNTER
LOV- 08/2020 NOV- 10/06/2020    Requested Prescriptions     Pending Prescriptions Disp Refills    insulin lispro (HUMALOG) 100 UNIT/ML injection vial [Pharmacy Med Name: HUMALOG INSULIN ([id number]) 10ML] 60 mL      Sig: ADMINISTER UP TO 60 UNITS VIA INSULIN PUMP DAILY

## 2020-10-06 ENCOUNTER — VIRTUAL VISIT (OUTPATIENT)
Dept: ENDOCRINOLOGY | Age: 65
End: 2020-10-06
Payer: COMMERCIAL

## 2020-10-06 PROCEDURE — 99214 OFFICE O/P EST MOD 30 MIN: CPT | Performed by: INTERNAL MEDICINE

## 2020-10-06 PROCEDURE — 3051F HG A1C>EQUAL 7.0%<8.0%: CPT | Performed by: INTERNAL MEDICINE

## 2020-10-06 RX ORDER — PREDNISONE 20 MG/1
TABLET ORAL
COMMUNITY
Start: 2020-09-01 | End: 2021-06-22

## 2020-10-06 RX ORDER — CALCITRIOL 0.25 UG/1
0.25 CAPSULE, LIQUID FILLED ORAL DAILY
COMMUNITY
Start: 2020-05-28

## 2020-10-06 NOTE — PROGRESS NOTES
Maria Dolores Hall is a 59 y.o. female who presents for Type 2 diabetes mellitus. 10/6/2020    TELEHEALTH EVALUATION -- Audio/Visual (During LKZYB-21 public health emergency)    Patient provided verbal consent to use the video visit. HPI:    Maria Dolores Hall (:  1955) has requested an audio/video evaluation for the following concern(s):      Current symptoms/problems include hyperglycemia and show no change. 1.  DM type 2, uncontrolled, with neuropathy ([facility] [address] [E11.40, E11.65]    Diagnosed with Type 2 diabetes mellitus in .     Comorbid conditions: hyperlipidemia, CKD and Neuropathy    Current diabetic medications include: Novolog, Trulicity  On Medtronic Minimed insulin pump  Intolerance to diabetes medications: No     Weight trend: stable  Prior visit with dietician: yes  Current diet: on average, 3 meals per day  Current exercise: active     Current monitoring regimen: home blood tests - 4 times daily  Has brought blood glucose log/meter:  Yes  Home blood sugar records: fasting range:  and postprandial range:    Any episodes of hypoglycemia? Rare  Hypoglycemia frequency and time(s):  twice a month  Does patient have Glucagon emergency kit? No  Does patient have rapid acting carbohydrate? Yes  Does patient wear a medic alert bracelet or necklace? No    2. Mixed hyperlipidemia  Has muscle pain. 3. Obesity (BMI 35.0-39.9 without comorbidity)  Eats healthy. 4. Essential hypertension  No headaches. 5. Vitamin D deficiency  No bone pain. 6. CKD (chronic kidney disease) stage 5  No urination problems. 7. MNG  N0 difficulty swallowing, voice change, history of radiation, thyroid cancer in family. 8. Nephropathy  No urination problems. ULTRASOUND THYROID, 2020 12:06 PM.       INDICATION: Thyroid nodule.  Goiter.       COMPARISON: Ultrasound thyroid, 10/5/2017.       TECHNIQUE: Multiple transverse and longitudinal gray scale images were obtained through the Nodules    ______________________________________________________________________   FINAL CYTOLGIC DIAGNOSIS    1 of 4. Thyroid, Fine Needle , Right Upper:       Satisfactory for evaluation.         Benign. Colloid nodule.               ______________________________________________________________________  Cytotechnologist: DL    Electronically Tunde Mejias. Naresh Cabrera MD  Pathologist  3/2/2016      Procedures/Addenda  FNA Immediate Specimen Evaluation     Date Ordered:     3/1/2016      Status: Signed Out        Date Complete:     3/1/2016     By: Acacia Mondragon       Date Reported:     3/2/2016         Preliminary Diagnosis  - Thyroid epithelial cells and colloid sufficient for evaluation. Results-Comments             Krystal Edwards. Naresh Cabrera MD        Gross Description    Received in Cytolyt.  30ml red, cloudy fluid. 8 prepared slides 4 airdried, 4 fixed    ______________________________________________________________________                                   CPT Code(s): 1: U9897194, [id number], [id number])           CYTOLOGY REPORT  ______________________________________________________________________  Patient Name: Maribeth Self    Accession #:        Location: [facility]     Med. Rec. #:  [id number]  Account [de-identified]  Soc. Sec. #:         : 1955 (Age: 60)   Gender: F  Physician(s): Wendy Woodward M.D. Collected: 3/1/2016  Received:  3/1/2016  ______________________________________________________________________  Source of Specimen(s)  2 of 4. Thyroid Fine Needle Aspiration, Right Lower    Clinical History  Pertinent Clinical History:   Other Clinical Conditions:    Clinical Diagnosis:  Thyroid nodules         ______________________________________________________________________   FINAL CYTOLGIC DIAGNOSIS    2 of 4. Thyroid Fine Needle Aspiration, Right Lower:       Satisfactory for evaluation.         Benign. Adenomatoid or colloid nodule.   Chronic lymphocytic thyroiditis.              ______________________________________________________________________  Cytotechnologist: DL    Electronically Daysi Tejada. Alethea Morales MD  Pathologist  3/2/2016      Procedures/Addenda  FNA Immediate Specimen Evaluation     Date Ordered:     3/1/2016      Status: Signed Out        Date Complete:     3/1/2016     By: Sage Vines       Date Reported:     3/2/2016         Preliminary Diagnosis  - Thyroid epithelial cells and colloid sufficient for evaluation. Results-Comments             Fernando Lubin. Alethea Morales MD        Gross Description  Received in Cytolyt.  30ml red, cloudy fluid. 8 prepared slides 4 airdried, 4 fixed    ______________________________________________________________________                                   CPT Code(s): 1: P7230770, F0086502, [id number])           CYTOLOGY REPORT  ______________________________________________________________________  Patient Name: Rowena Boykin    Accession #:        Location: [id number]     Med. Rec. #:  [id number]  Account [de-identified]  Soc. Sec. #:         : 1955 (Age: 60)   Gender: F  Physician(s): Camilla Villa M.D. Collected: 3/1/2016  Received:  3/1/2016  ______________________________________________________________________  Source of Specimen(s)  3 of 4. Thyroid, Fine Needle Aspiration, Isthmus    Clinical History  Pertinent Clinical History:   Other Clinical Conditions:    Clinical Diagnosis:  Thyroid nodules    ______________________________________________________________________   FINAL CYTOLGIC DIAGNOSIS    3 of 4. Thyroid, Fine Needle Aspiration, Isthmus:       Satisfactory for evaluation.         Benign. Colloid nodule.                 ______________________________________________________________________  Cytotechnologist: DL    Electronically Daysi Tejada.  Alethea Morales MD  Pathologist  3/2/2016      Procedures/Addenda  FNA Immediate Specimen Evaluation     Date Ordered:     3/1/2016      Status: Signed Out        Date Complete:     3/1/2016     By: Marko Mccurdy       Date Reported:     3/2/2016         Preliminary Diagnosis  - Thyroid epithelial cells and colloid sufficient for evaluation. Results-Comments             Marko Vogel. Elaine Brandt MD        Gross Description  Received in Cytolyt.  30ml red, cloudy fluid. 8 prepared slides 4 airdried, 4 fixed    ______________________________________________________________________                                   CPT Code(s): 1: V1343135, [id number], [id number])           CYTOLOGY REPORT  ______________________________________________________________________  Patient Name: Alisha Alvarez    Accession #:        Location: [facility] Rec. #:  [id number]  Account [de-identified]  Soc. Sec. #:         : 1955 (Age: 60)   Gender: F  Physician(s): Placido Marrero M.D. Collected: 3/1/2016  Received:  3/1/2016  ______________________________________________________________________  Source of Specimen(s)  4 of 4. Thyroid, Fine Needle Aspiration, Left mid    Clinical History  Pertinent Clinical History:   Other Clinical Conditions:    Clinical Diagnosis:  Thyroid nodules    ______________________________________________________________________   FINAL CYTOLGIC DIAGNOSIS    4 of 4. Thyroid, Fine Needle Aspiration, Left mid:       Satisfactory for evaluation.         Benign. Colloid nodule. .               ______________________________________________________________________  Cytotechnologist: DL    Electronically Kalani Goss. Elaine Brandt MD  Pathologist  3/2/2016      Procedures/Addenda  FNA Immediate Specimen Evaluation     Date Ordered:     3/1/2016      Status: Signed Out        Date Complete:     3/1/2016     By: Marko Mccurdy       Date Reported:     3/2/2016         Preliminary Diagnosis  - Thyroid epithelial cells and colloid sufficient for evaluation. Results-Comments             Marko Ornelas, MD        Gross Description  Received in Cytolyt.  30ml red, cloudy fluid. 8 prepared slides 4 airdried, 4 fixed    ______________________________________________________________________                                   CPT Code(s): 1: I1699259, [id number], [id number])           Lab Results   Component Value Date    LABA1C 7.9 (H) 10/03/2020     No results found for: EAG      IMPRESSION:    1.  Slight asymmetric enlargement of the right lobe of the thyroid gland as compared to the left lobe of the thyroid gland. There is a heterogeneous echotexture demonstrated in each lobe of the thyroid gland as well as along the isthmus. 2.  There are 3 nodules demonstrated in the right lobe of the thyroid gland which have not substantially changed in size compared to the previous ultrasound study. An additional nodule in the left lobe of the thyroid gland and along the isthmus have also not substantially changed in size. Workstation [id number]   Result Narrative   STUDY:  Ultrasound examination of the thyroid gland. CLINICAL:   Follow-up of thyroid gland nodules    COMPARISON:   2/1/2016    TECHNIQUE:   Ultrasound examination of the thyroid gland is performed with grayscale and with color Doppler. FINDINGS:     The right lobe of the thyroid gland measures 6.0 x 2.0 x 2.9 cm. On the previous ultrasound study the right lobe measured 5.3 x 2.2 x 2.8 cm. There is a diffusely heterogeneous echotexture in the right lobe of the thyroid gland. There is a well-defined hypoechoic nodule along the upper portion of the right lobe measuring 0.6 x 0.9 x 0.5 cm. There is a solid nodule in the midportion of the right lobe measuring 2.6 x 2.1 x 2.5 cm. This has slightly ill-defined margins. Inferiorly there is an additional solid nodule measuring 1.8 x 2.2 x 1.2 cm which may have a small amount of calcification along the wall.  On the previous ultrasound study, the largest nodule in the midportion of the right lobe measured up to 2.8 cm and the nodule in the lower pole measured up to 2.4 cm indicating no substantial interval change in size. The left lobe of the thyroid gland currently measures 4.9 x 2.3 x 2.3 cm. On the previous ultrasound study the left lobe measured 5.4 x 2.8 x 2.6 cm. There is a heterogeneous echotexture in the left lobe of the thyroid gland. There is a solid nodule in the mid to lower portion of the left lobe measuring 1.7 x 2.8 x 1.5 cm. On the previous study this measured up to 2.9 cm indicating no substantial interval change. The isthmus measures 6 mm in thickness. There is a nodule along the anterior aspect of the isthmus measuring 1.0 x 0.8 x 0.8 cm. On the previous ultrasound this measured up to 1 cm indicating no significant interval change.          Past Medical History:   Diagnosis Date    Anemia     Eye pressure     Hypertension     Kidney failure     Type 2 diabetes mellitus without complication (HCC)       Patient Active Problem List   Diagnosis    Type 2 diabetes, controlled, with neuropathy ([address]    Mixed hyperlipidemia    Obesity (BMI 35.0-39.9 without comorbidity)    Essential hypertension    Vitamin D deficiency    CKD (chronic kidney disease) stage 5, GFR less than 15 ml/min (HCC)    Multinodular goiter    Controlled type 2 diabetes mellitus with diabetic nephropathy (HCC)    DM type 2, uncontrolled, with neuropathy ([address]    Uncontrolled type 2 diabetes mellitus with diabetic nephropathy (HCC)     Past Surgical History:   Procedure Laterality Date    COLONOSCOPY       Social History     Socioeconomic History    Marital status:      Spouse name: Not on file    Number of children: Not on file    Years of education: Not on file    Highest education level: Not on file   Occupational History    Not on file   Social Needs    Financial resource strain: Not on file    Food insecurity     Worry: Not on file     Inability: Not on file    Transportation needs     Medical: Not on file Non-medical: Not on file   Tobacco Use    Smoking status: Never Smoker    Smokeless tobacco: Never Used   Substance and Sexual Activity    Alcohol use: No    Drug use: No    Sexual activity: Never   Lifestyle    Physical activity     Days per week: Not on file     Minutes per session: Not on file    Stress: Not on file   Relationships    Social connections     Talks on phone: Not on file     Gets together: Not on file     Attends Caoda[employer] service: Not on file     Active member of club or organization: Not on file     Attends meetings of clubs or organizations: Not on file     Relationship status: Not on file    Intimate partner violence     Fear of current or ex partner: Not on file     Emotionally abused: Not on file     Physically abused: Not on file     Forced sexual activity: Not on file   Other Topics Concern    Not on file   Social History Narrative    Not on file     Family History   Problem Relation Age of Onset    Diabetes Mother     Heart Attack Mother     Cancer Father     No Known Problems Daughter     No Known Problems Daughter      Current Outpatient Medications   Medication Sig Dispense Refill    calcitRIOL (ROCALTROL) 0.25 MCG capsule Take 0.25 mcg by mouth daily      insulin lispro (HUMALOG) 100 UNIT/ML injection vial ADMINISTER UP TO 60 UNITS VIA INSULIN PUMP DAILY 60 mL 0    insulin aspart (NOVOLOG) 100 UNIT/ML injection vial ADMINISTER UP TO 60 UNITS VIA INSULIN PUMP DAILY 20 mL 2    hydrALAZINE (APRESOLINE) 50 MG tablet Take 1 tablet by mouth 2 times daily      CONTOUR NEXT TEST strip 1 each by In Vitro route 4 times daily As needed.  375 strip 3    furosemide (LASIX) 80 MG tablet Take 80 mg by mouth daily       metoprolol tartrate (LOPRESSOR) 50 MG tablet Take 50 mg by mouth 2 times daily      amLODIPine (NORVASC) 5 MG tablet Take 5 mg by mouth daily      vitamin D (CHOLECALCIFEROL) 5000 units CAPS capsule Take 5,000 Units by mouth daily      latanoprost (XALATAN) 0.005 % ophthalmic solution Place into the left eye       predniSONE (DELTASONE) 20 MG tablet TK 3 TS PO QD FOR 4 DAYS THEN TK 2 TS PO D FOR 4 DAYS      pravastatin (PRAVACHOL) 20 MG tablet Take 1 tablet by mouth daily (Patient not taking: Reported on 10/6/2020) 30 tablet 3    paricalcitol (ZEMPLAR) 1 MCG capsule Take 1 mcg by mouth      Darbepoetin Willie-Albumin (ARANESP IJ) Inject as directed every 30 days       No current facility-administered medications for this visit.       Allergies   Allergen Reactions    Fenofibrate Other (See Comments)     Muscle cramps in legs    Ace Inhibitors      Other reaction(s): Cough     Family Status   Relation Name Status    Mother      Father     Edward Laurent  Alive    Anastasiia  Alive       Review of Systems  Constitutional: no fatigue, no fever, no recent weight gain, no recent weight loss, no changes in appetite  Eyes: no eye pain, no change in vision, no eye redness, no eye irritation, no double vision  Ears, nose, throat: no nasal congestion, no sore throat, no earache, no decrease in hearing, no hoarseness, no dry mouth, no sinus problems, no difficulty swallowing, no neck lumps, no dental problems, no mouth sores, no ringing in ears  Pulmonary: no shortness of breath, no wheezing, no dyspnea on exertion, no cough  Cardiovascular: no chest pain, no lower extremity edema, no orthopnea, no intermittent leg claudication, no palpitations  Gastrointestinal: no abdominal pain, no nausea, no vomiting, no diarrhea, no constipation, no dysphagia, no heartburn, no bloating  Genitourinary: no dysuria, no urinary incontinence, no urinary hesitancy, no urinary frequency, no feelings of urinary urgency, no nocturia  Musculoskeletal: no joint swelling, no joint stiffness, has joint pain, no muscle cramps, no muscle pain, no bone pain  Integument/Breast: no hair loss, no skin rashes, no skin lesions, no itching, no dry skin  Neurological: no numbness, no tingling, no weakness, no confusion, no headaches, no dizziness, no fainting, no tremors, no decrease in memory, has balance problems  Psychiatric: no anxiety, no depression, no insomnia  Hematologic/Lymphatic: no tendency for easy bleeding, no swollen lymph nodes, no tendency for easy bruising  Immunology: no seasonal allergies, no frequent infections, no frequent illnesses  Endocrine: no temperature intolerance     OBJECTIVE:    Wt Readings from Last 3 Encounters:   02/27/20 201 lb 3.2 oz (91.3 kg)   11/25/19 201 lb (91.2 kg)   04/09/19 202 lb 12.8 oz (92 kg)         OBJECTIVE:  Constitutional: no apparent distress, well developed and well nourished  Mental status: alert and awake, oriented to person, place and time, able to follow commands  Psychiatric: judgement and insight and normal, recent and remote memory are intact, mood and affect are normal  Skin: skin inspection appears normal, no significant exanthematous lesions or discoloration noted on facial skin  Head and Face: head and face inspection revealed no abnormalities, normocephalic, atraumatic  Eyes: no lid or conjunctival swelling, erythema or discharge, sclera appears normal  Ears/Nose: external inspection of ears and nose revealed no abnormalities, hearing is grossly normal  Oropharynx/Mouth/Face: lips are normal with no lesions, the voice quality was normal  Neck: neck is symmetric, no visualized mass  Pulmonary/chest: respiratory effort normal, no generalized signs of difficulty breathing or signs of respiratory distress  Musculoskeletal: normal station, normal range of motion of neck  Neurological: no facial asymmetry, normal general cortical function      ASSESSMENT/PLAN:  1.  Type 2 diabetes mellitus, uncontrolled, with neuropathy (HCC)  Hemoglobin A1c 6.6-6.3-6.7-7.1-7.9  Same rates and ratios in pump  Medtronic Minimed 630G  Patient was on steroids, BG were high when on steroids, now better  - insulin aspart (NOVOLOG) 100 UNIT/ML injection vial; Inject up to 60 units subcutaneously daily via insulin pump. Dispense: 2 vial; Refill: 3  - Comprehensive Metabolic Panel; Future  - Hemoglobin A1C; Future    2. Mixed hyperlipidemia  LDL cholesterol 96-13-18-50-70  Had muscle pain. Now no pain  Pravastatin  - Lipid Panel; Future  Take fish oil    3. Obesity (BMI 35.0-39.9 without comorbidity)  Diet. 4. Essential hypertension  Current meds. 5. Vitamin D deficiency  Increase Vitamin D 5000 IU qd  25 hydroxy vitamin D 65-17-61-46-35  Vitamin D 25 Hydroxy; Future    6. CKD (chronic kidney disease) stage 5  Nephrology follow up. GFR 11    7. MNG  Did not do biopsy  Will do it later  Understands the risks of thyroid cancer, but states that she has other issues to take care of first  TSH 2.0-1.17-1.3  FNA of the right 2.6 cm nodule, left 2.4 cm nodules, increase in size  - Thyroid sonogram    8. Nephropathy  Follow with Nephrology. Reviewed and/or ordered clinical lab results Yes  Reviewed and/or ordered radiology tests No  Reviewed and/or ordered other diagnostic tests No  Discussed test results with performing physician No  Independently reviewed image, tracing, or specimen no  Made a decision to obtain old records Yes  Reviewed old records No  Obtained history from other than patient No    Comfort Montez was counseled regarding symptoms of diabetes diagnosis, course and complications of disease if inadequately treated, side effects of medications, diagnosis, treatment options, and prognosis, risks, benefits, complications, and alternatives of treatment, labs, imaging and other studies and treatment targets and goals, thyroid cancer risk. She understands instructions and counseling. Yoel Rios is a 59 y.o. female being evaluated by a Virtual Visit (video visit) encounter, including two-way audio and video communication, in lieu of an in-person visit due to coronavirus emergency, to address concerns as mentioned in history and assessment and plan.      Patient identification was verified at the start of the visit. I conducted an interview, performed a limited exam by video and educated the patient on my assessment and plan. Due to this being a TeleHealth encounter (During [facility] public health emergency), evaluation of the following organ systems was limited: Vitals/Constitutional/EENT/Resp/CV/GI//MS/Neuro/Skin/Heme-Lymph-Imm. Pursuant to the emergency declaration under the [address] [place] [address], [address] [place] [address] and the [employer] Act, this Virtual Visit was conducted with patient's (and/or legal guardian's) consent, to reduce the patient's risk of exposure to COVID-19 and provide necessary medical care. The patient (and/or legal guardian) has also been advised to contact this office for worsening conditions or problems, and seek emergency medical treatment and/or call 911 if deemed necessary. I spent: 25 face-to-face with the patient via Telehealth (synchronous, real-time audio/visual connection), and over 50% of that time was spent on counseling and care coordination. See assessment, plan and counseling note for counseling and care coordination details. Services were provided through a video synchronous discussion virtually to substitute for in-person clinic visit. Persons participating in the telehealth service: provider - Eloy Faustin MD and patient Minal Clemons. Provider was located at her office. Patient was located at home. --Eloy Faustin MD on 10/6/2020 at 12:34 PM    An electronic signature was used to authenticate this note. Return in about 3 months (around 1/6/2021) for diabetes, thyroid problems.

## 2020-10-26 ENCOUNTER — TELEPHONE (OUTPATIENT)
Dept: ENDOCRINOLOGY | Age: 65
End: 2020-10-26

## 2020-10-26 NOTE — TELEPHONE ENCOUNTER
PT states her Contour Next meter is not connecting to her pump and she needs assistance in what to do.

## 2020-10-27 ENCOUNTER — TELEPHONE (OUTPATIENT)
Dept: ENDOCRINOLOGY | Age: 65
End: 2020-10-27

## 2020-10-27 NOTE — TELEPHONE ENCOUNTER
PT lvm to requests a call to let her know if we received a fax for her and what should she do regarding her Insulin pump. She has an upcoming Surgery scheduled for Friday to place a catheter for dialysis in her stomach.

## 2020-10-27 NOTE — TELEPHONE ENCOUNTER
Spoke with patient  Reviewed pump settings. Patient is having well-controlled blood glucose levels with morning numbers in 125-135 range. Patient stated that the procedure will last around 15 minutes. If any problem keeping the pump on or interfering with the procedure site, it is okay to take the pump off for the time of the procedure. Patient asked if paperwork for assistance program was taking care of. I informed her that paperwork was faxed.

## 2020-10-27 NOTE — TELEPHONE ENCOUNTER
Patient is having surgery on Friday and was told to check with Dr Marissa Davis regarding her insulin instructions.

## 2020-11-16 ENCOUNTER — TELEPHONE (OUTPATIENT)
Dept: ENDOCRINOLOGY | Age: 65
End: 2020-11-16

## 2021-03-19 ENCOUNTER — VIRTUAL VISIT (OUTPATIENT)
Dept: ENDOCRINOLOGY | Age: 66
End: 2021-03-19
Payer: MEDICARE

## 2021-03-19 DIAGNOSIS — E78.2 MIXED HYPERLIPIDEMIA: ICD-10-CM

## 2021-03-19 DIAGNOSIS — E04.2 MULTINODULAR GOITER: ICD-10-CM

## 2021-03-19 DIAGNOSIS — E11.40 TYPE 2 DIABETES, CONTROLLED, WITH NEUROPATHY (HCC): Primary | ICD-10-CM

## 2021-03-19 DIAGNOSIS — E55.9 VITAMIN D DEFICIENCY: ICD-10-CM

## 2021-03-19 DIAGNOSIS — N18.5 CKD (CHRONIC KIDNEY DISEASE) STAGE 5, GFR LESS THAN 15 ML/MIN (HCC): ICD-10-CM

## 2021-03-19 DIAGNOSIS — I10 ESSENTIAL HYPERTENSION: ICD-10-CM

## 2021-03-19 PROCEDURE — 99215 OFFICE O/P EST HI 40 MIN: CPT | Performed by: INTERNAL MEDICINE

## 2021-03-19 NOTE — PROGRESS NOTES
Charles Ryder is a 72 y.o. female who presents for Type 2 diabetes mellitus. 1.  DM type 2, uncontrolled, with neuropathy ([facility] [address] [E11.40, E11.65]    3/19/2021    TELEHEALTH EVALUATION -- Audio/Visual (During [facility] public [facility] emergency)    Patient provided verbal consent to use the video visit. HPI:    Charles Ryder (:  1955) has requested an audio/video evaluation for the following concern(s):       Diagnosed with Type 2 diabetes mellitus in .     Comorbid conditions: hyperlipidemia, CKD and Neuropathy     Current diabetic medications include: Novolog, Trulicity  On Medtronic Minimed insulin pump  Intolerance to diabetes medications: No     Weight trend: stable  Prior visit with dietician: yes  Current diet: on average, 3 meals per day  Current exercise: active     Current monitoring regimen: home blood tests - 4 times daily  Has brought blood glucose log/meter:  Yes  Home blood sugar records: fasting range:  and postprandial range:    Any episodes of hypoglycemia? Rare  Hypoglycemia frequency and time(s):  twice a month  Does patient have Glucagon emergency kit? No  Does patient have rapid acting carbohydrate?  Yes  Does patient wear a medic alert bracelet or necklace?  No     2. Mixed hyperlipidemia  Has muscle pain.     3. Obesity (BMI 35.0-39.9 without comorbidity)  Eats healthy.     4. Essential hypertension  No headaches.     5. Vitamin D deficiency  No bone pain.     6. CKD (chronic kidney disease) stage 5  No urination problems.     7. MNG  No difficulty swallowing, voice change, history of radiation, thyroid cancer in family.     8. Nephropathy  No urination problems.     ULTRASOUND THYROID, 2020 12:06 PM.       INDICATION: Thyroid nodule.  Goiter.       COMPARISON: Ultrasound thyroid, 10/5/2017.       TECHNIQUE: Multiple transverse and longitudinal gray scale images were obtained through the    thyroid gland.             ULTRASOUND THYROID, 2020 12:06 PM.     INDICATION: Thyroid nodule. Goiter.       COMPARISON: Ultrasound thyroid, 10/5/2017.       TECHNIQUE: Multiple transverse and longitudinal gray scale images were obtained through the    thyroid gland.       FINDINGS:        Right lobe measures 2.2 x 2.5 x 5.3 cm. Left lobe measures 2.0 x 2.6 x 5.3 cm. Isthmus is 13 mm    in thickness. Thyroid gland is heterogeneous in echotexture throughout. There is    redemonstration of multiple nodules within the bilateral lobes, numbering at least 5.       Largest nodule is located inferiorly within the left lobe and is TI-RADS Category 4. This    measures 1.9 x 2.1 x 2.4 cm compared to 1.2 x 1.7 x 2.2 cm previously. This lesion is    heterogeneous, is wider than tall, and has regions of hypoechoic and hyperechoic components. No    evidence of cystic change or microcalcifications.       Largest nodule in the right lobe measures 1.6 x 2.3 x 2.6 cm. This nodule is TI-RADS Category    4, wider than tall, and mildly heterogeneous with a few foci of microcalcification. This nodule    previously measured 1.2 x 1.7 x 2.2 cm. No definite new nodule.           IMPRESSION:        BILATERAL TI-RADS CATEGORY 4 LESIONS SHOWING MILD INCREASE IN SIZE AS COMPARED TO PREVIOUS    STUDY 2017. CORRELATION WITH SHORT INTERVAL FOLLOW-UP OR FINE-NEEDLE ASPIRATION MAY BE HELPFUL    FOR FURTHER EVALUATION. PATIENT HAS PREVIOUSLY UNDERGONE BIOPSY OF MULTIPLE NODULES IN 2016.       DICTATED Ayaka Marr M.D. Workstation [id number]         CYTOLOGY REPORT  ______________________________________________________________________  Patient Name: Siria Call    Accession #:        Location: [facility]. #:  [id number]  Account [de-identified]  Soc. Sec. #:         : 1955 (Age: 60)   Gender: F  Physician(s): Thang Mayo M.D.       Collected: 3/1/2016  Received:  3/1/2016  ______________________________________________________________________  Source of Specimen(s)  1 of 4. Thyroid, Fine Needle , Right Upper    Clinical History  Pertinent Clinical History:   Other Clinical Conditions:    Clinical Diagnosis:  Thyroid Nodules    ______________________________________________________________________   FINAL CYTOLGIC DIAGNOSIS    1  4. Thyroid, Fine Needle , Right Upper:       Satisfactory for evaluation.         Benign. Colloid nodule.               ______________________________________________________________________  Cytotechnologist: SHRUTI    Electronically Mallory Wiley. Janice Barros MD  Pathologist  3/2/2016      Procedures/Addenda  FNA Immediate Specimen Evaluation     Date Ordered:     3/1/2016      Status: Signed Out        Date Complete:     3/1/2016     By: Monroe Ruby       Date Reported:     3/2/2016         Preliminary Diagnosis  - Thyroid epithelial cells and colloid sufficient for evaluation. Results-Comments             Mitra Kulkarni. Janice Barros MD        Gross Description    Received in Cytolyt.  30ml red, cloudy fluid. 8 prepared slides 4 airdried, 4 fixed    ______________________________________________________________________                                   CPT Code(s): 1: X8308811, T0128815, [id number])           CYTOLOGY REPORT  ______________________________________________________________________  Patient Name: Joe Harry    Accession #:        Location: [facility]     Med. Rec. #:  [id number]  Account [de-identified]  Soc. Sec. #:         : 1955 (Age: 60)   Gender: F  Physician(s): Lis Lindsay M.D. Collected: 3/1/2016  Received:  3/1/2016  ______________________________________________________________________  Source of Specimen(s)  2 of 4.  Thyroid Fine Needle Aspiration, Right Lower    Clinical History  Pertinent Clinical History:   Other Clinical Conditions:    Clinical Diagnosis:  Thyroid nodules         ______________________________________________________________________   FINAL CYTOLGIC DIAGNOSIS    2 of 4. Thyroid Fine Needle Aspiration, Right Lower:       Satisfactory for evaluation.         Benign. Adenomatoid or colloid nodule. Chronic lymphocytic thyroiditis.              ______________________________________________________________________  Cytotechnologist: DL    Electronically Nanci Knock. Antonio Kelsey MD  Pathologist  3/2/2016      Procedures/Addenda  FNA Immediate Specimen Evaluation     Date Ordered:     3/1/2016      Status: Signed Out        Date Complete:     3/1/2016     By: Brittany Rosario       Date Reported:     3/2/2016         Preliminary Diagnosis  - Thyroid epithelial cells and colloid sufficient for evaluation. Results-Comments             Delvin Lesch. Antonio Kelsey MD        Gross Description  Received in Cytolyt.  30ml red, cloudy fluid. 8 prepared slides 4 airdried, 4 fixed    ______________________________________________________________________                                   CPT Code(s): 1: J2792077, U7562774, [id number])           CYTOLOGY REPORT  ______________________________________________________________________  Patient Name: Siria Call    Accession #:        Location: [facility]     Med. Rec. #:  [id number]  Account [de-identified]  Soc. Sec. #:         : 1955 (Age: 60)   Gender: F  Physician(s): Thang Mayo M.D. Collected: 3/1/2016  Received:  3/1/2016  ______________________________________________________________________  Source of Specimen(s)  3 . Thyroid, Fine Needle Aspiration, Isthmus    Clinical History  Pertinent Clinical History:   Other Clinical Conditions:    Clinical Diagnosis:  Thyroid nodules    ______________________________________________________________________   FINAL CYTOLGIC DIAGNOSIS    3 of 4. Thyroid, Fine Needle Aspiration, Isthmus:       Satisfactory for evaluation.         Benign.   Colloid nodule.                 ______________________________________________________________________  Cytotechnologist: DL    Electronically [facility] Darnell. Owen Mensah MD  Pathologist  3/2/2016      Procedures/Addenda  FNA Immediate Specimen Evaluation     Date Ordered:     3/1/2016      Status: Signed Out        Date Complete:     3/1/2016     By: Ej Tran       Date Reported:     3/2/2016         Preliminary Diagnosis  - Thyroid epithelial cells and colloid sufficient for evaluation. Results-Comments             Destiny Cuenca. Owen Mensah MD        Gross Description  Received in Cytolyt.  30ml red, cloudy fluid. 8 prepared slides 4 airdried, 4 fixed    ______________________________________________________________________                                   CPT Code(s): 1: D245770, G4945762, [id number])           CYTOLOGY REPORT  ______________________________________________________________________  Patient Name: Mercedes Harris    Accession #:        Location: [facility] Rec. #:  [id number]  Account [de-identified]  Soc. Sec. #:         : 1955 (Age: 60)   Gender: F  Physician(s): Paul Dobbs M.D. Collected: 3/1/2016  Received:  3/1/2016  ______________________________________________________________________  Source of Specimen(s)  4 of 4. Thyroid, Fine Needle Aspiration, Left mid    Clinical History  Pertinent Clinical History:   Other Clinical Conditions:    Clinical Diagnosis:  Thyroid nodules    ______________________________________________________________________   FINAL CYTOLGIC DIAGNOSIS    4 of 4. Thyroid, Fine Needle Aspiration, Left mid:       Satisfactory for evaluation.         Benign. Colloid nodule. .               ______________________________________________________________________  Cytotechnologist: DL    Electronically [facility] Darnell.  Owen Mensah MD  Pathologist  3/2/2016      Procedures/Addenda  FNA Immediate Specimen Evaluation     Date Ordered:     3/1/2016      Status: Signed Out        Date Complete:     3/1/2016     By: Ej Tran       Date Reported:     3/2/2016     Preliminary Diagnosis  - Thyroid epithelial cells and colloid sufficient for evaluation. Results-Comments             Jessica Glover. Merline Lais, MD        Gross Description  Received in Cytolyt.  30ml red, cloudy fluid. 8 prepared slides 4 airdried, 4 fixed    ______________________________________________________________________                                   CPT Code(s): 1: A9960744, L4982068, A414048)           Lab Results   Component Value Date    LABA1C 7.9 (H) 10/03/2020     No results found for: EAG      IMPRESSION:    1.  Slight asymmetric enlargement of the right lobe of the thyroid gland as compared to the left lobe of the thyroid gland. There is a heterogeneous echotexture demonstrated in each lobe of the thyroid gland as well as along the isthmus. 2.  There are 3 nodules demonstrated in the right lobe of the thyroid gland which have not substantially changed in size compared to the previous ultrasound study. An additional nodule in the left lobe of the thyroid gland and along the isthmus have also not substantially changed in size. Workstation [id number]   Result Narrative   STUDY:  Ultrasound examination of the thyroid gland. CLINICAL:   Follow-up of thyroid gland nodules    COMPARISON:   2/1/2016    TECHNIQUE:   Ultrasound examination of the thyroid gland is performed with grayscale and with color Doppler. FINDINGS:     The right lobe of the thyroid gland measures 6.0 x 2.0 x 2.9 cm. On the previous ultrasound study the right lobe measured 5.3 x 2.2 x 2.8 cm. There is a diffusely heterogeneous echotexture in the right lobe of the thyroid gland. There is a well-defined hypoechoic nodule along the upper portion of the right lobe measuring 0.6 x 0.9 x 0.5 cm. There is a solid nodule in the midportion of the right lobe measuring 2.6 x 2.1 x 2.5 cm. This has slightly ill-defined margins.  Inferiorly there is an additional solid nodule measuring 1.8 x 2.2 x 1.2 cm which may have a small amount of strain: Not on file    Food insecurity     Worry: Not on file     Inability: Not on file    Transportation needs     Medical: Not on file     Non-medical: Not on file   Tobacco Use    Smoking status: Never Smoker    Smokeless tobacco: Never Used   Substance and Sexual Activity    Alcohol use: No    Drug use: No    Sexual activity: Never   Lifestyle    Physical activity     Days per week: Not on file     Minutes per session: Not on file    Stress: Not on file   Relationships    Social connections     Talks on phone: Not on file     Gets together: Not on file     Attends Hindu service: Not on file     Active member of club or organization: Not on file     Attends meetings of clubs or organizations: Not on file     Relationship status: Not on file    Intimate partner violence     Fear of current or ex partner: Not on file     Emotionally abused: Not on file     Physically abused: Not on file     Forced sexual activity: Not on file   Other Topics Concern    Not on file   Social History Narrative    Not on file     Family History   Problem Relation Age of Onset    Diabetes Mother     Heart Attack Mother     Cancer Father     No Known Problems Daughter     No Known Problems Daughter      Current Outpatient Medications   Medication Sig Dispense Refill    insulin lispro (HUMALOG) 100 UNIT/ML injection vial ADMINISTER UP TO 60 UNITS VIA INSULIN PUMP DAILY 60 mL 3    calcitRIOL (ROCALTROL) 0.25 MCG capsule Take 0.25 mcg by mouth daily      hydrALAZINE (APRESOLINE) 50 MG tablet Take 1 tablet by mouth 2 times daily      CONTOUR NEXT TEST strip 1 each by In Vitro route 4 times daily As needed.  375 strip 3    furosemide (LASIX) 80 MG tablet Take 160 mg by mouth 2 times daily       pravastatin (PRAVACHOL) 20 MG tablet Take 1 tablet by mouth daily 30 tablet 3    metoprolol tartrate (LOPRESSOR) 50 MG tablet Take 50 mg by mouth 2 times daily      amLODIPine (NORVASC) 5 MG tablet Take 5 mg by mouth daily  vitamin D (CHOLECALCIFEROL) 5000 units CAPS capsule Take 5,000 Units by mouth daily      predniSONE (DELTASONE) 20 MG tablet TK 3 TS PO QD FOR 4 DAYS THEN TK 2 TS PO D FOR 4 DAYS      insulin aspart (NOVOLOG) 100 UNIT/ML injection vial ADMINISTER UP TO 60 UNITS VIA INSULIN PUMP DAILY (Patient not taking: Reported on 3/19/2021) 20 mL 2    paricalcitol (ZEMPLAR) 1 MCG capsule Take 1 mcg by mouth      Darbepoetin Willie-Albumin (ARANESP IJ) Inject as directed every 30 days      latanoprost (XALATAN) 0.005 % ophthalmic solution Place into the left eye        No current facility-administered medications for this visit.       Allergies   Allergen Reactions    Fenofibrate Other (See Comments)     Muscle cramps in legs    Ace Inhibitors      Other reaction(s): Cough     Family Status   Relation Name Status    Mother      Father     [facility] Anastasiia  Alive    Anastasiia  Alive       Review of Systems  Constitutional: no fatigue, no fever, no recent weight gain, no recent weight loss, no changes in appetite  Eyes: no eye pain, no change in vision, no eye redness, no eye irritation, no double vision  Ears, nose, throat: no nasal congestion, no sore throat, no earache, no decrease in hearing, no hoarseness, no dry mouth, no sinus problems, no difficulty swallowing, no neck lumps, no dental problems, no mouth sores, no ringing in ears  Pulmonary: no shortness of breath, no wheezing, no dyspnea on exertion, no cough  Cardiovascular: no chest pain, no lower extremity edema, no orthopnea, no intermittent leg claudication, no palpitations  Gastrointestinal: no abdominal pain, no nausea, no vomiting, no diarrhea, no constipation, no dysphagia, no heartburn, no bloating  Genitourinary: no dysuria, no urinary incontinence, no urinary hesitancy, no urinary frequency, no feelings of urinary urgency, no nocturia  Musculoskeletal: no joint swelling, no joint stiffness, has joint pain, no muscle cramps, no muscle pain, no bone pain  Integument/Breast: no hair loss, no skin rashes, no skin lesions, no itching, no dry skin  Neurological: no numbness, no tingling, no weakness, no confusion, no headaches, no dizziness, no fainting, no tremors, no decrease in memory, has balance problems  Psychiatric: no anxiety, no depression, no insomnia  Hematologic/Lymphatic: no tendency for easy bleeding, no swollen lymph nodes, no tendency for easy bruising  Immunology: no seasonal allergies, no frequent infections, no frequent illnesses  Endocrine: no temperature intolerance     OBJECTIVE:  There were no vitals taken for this visit. Wt Readings from Last 3 Encounters:   02/27/20 201 lb 3.2 oz (91.3 kg)   11/25/19 201 lb (91.2 kg)   04/09/19 202 lb 12.8 oz (92 kg)     OBJECTIVE:  Constitutional: no apparent distress, well developed and well nourished  Mental status: alert and awake, oriented to person, place and time, able to follow commands  Psychiatric: judgement and insight and normal, recent and remote memory are intact, mood and affect are normal  Skin: skin inspection appears normal, no significant exanthematous lesions or discoloration noted on facial skin  Head and Face: head and face inspection revealed no abnormalities, normocephalic, atraumatic  Eyes: no lid or conjunctival swelling, erythema or discharge, sclera appears normal  Ears/Nose: external inspection of ears and nose revealed no abnormalities, hearing is grossly normal  Oropharynx/Mouth/Face: lips are normal with no lesions, the voice quality was normal  Neck: neck is symmetric, no visualized mass  Pulmonary/chest: respiratory effort normal, no generalized signs of difficulty breathing or signs of respiratory distress  Musculoskeletal: normal station, normal range of motion of neck  Neurological: no facial asymmetry, normal general cortical function    ASSESSMENT/PLAN:    1.  Type 2 diabetes mellitus, uncontrolled, with neuropathy (HCC)  Uncontrolled  Upgrade Medtronic insulin pump, possibly CGM  Coordinated care with [employer]  Patient has wide fluctuations of her blood glucose  Checks blood glucose at least 4 times daily  Hemoglobin A1c 6.6-6.3-6.7-7.1-7.9-7.8  Same rates and ratios in pump  Medtronic Minimed 630G  - insulin aspart (NOVOLOG) 100 UNIT/ML injection vial; Inject up to 60 units subcutaneously daily via insulin pump. Dispense: 2 vial; Refill: 3  - Comprehensive Metabolic Panel; Future  - Hemoglobin A1C; Future     2. Mixed hyperlipidemia  LDL cholesterol [id number]  Had muscle pain. Now no pain  Pravastatin  - Lipid Panel; Future  Take fish oil     3. Obesity (BMI 35.0-39.9 without comorbidity)  Diet.     4. Essential hypertension  Current meds.     5. Vitamin D deficiency  Vitamin D 5000 IU qd  25 hydroxy vitamin D [id number]  Vitamin D 25 Hydroxy; Future     6. CKD (chronic kidney disease) stage 5  Nephrology follow up. GFR 11  On peritoneal dialysis    7. MNG  Did not do biopsy. Patient stated that she has too much stress and does not want to deal with this at this time. Later will do it, now refused  Understands the risks of thyroid cancer, but states that she has other issues to take care of first  TSH 2.0-1.17-1.3  FNA of the right 2.6 cm nodule, left 2.4 cm nodules, increase in size  - Thyroid sonogram     8. Nephropathy  Follow with Nephrology.     Reviewed and/or ordered clinical lab results Yes  Reviewed and/or ordered radiology tests No  Reviewed and/or ordered other diagnostic tests No  Discussed test results with performing physician No  Independently reviewed image, tracing, or specimen no  Made a decision to obtain old records Yes  Reviewed old records No  Obtained history from other than patient No    Comfort Montez was counseled regarding symptoms of diabetes diagnosis, course and complications of disease if inadequately treated, side effects of medications, diagnosis, treatment options, and prognosis, risks, benefits, complications, and alternatives of treatment, labs, imaging and other studies and treatment targets and goals, thyroid cancer risk. She understands instructions and counseling. Vanessa Fischer is a 72 y.o. female being evaluated by a Virtual Visit (video visit) encounter, including two-way audio and video communication, in lieu of an in-person visit due to coronavirus emergency, to address concerns as mentioned in history and assessment and plan. Patient identification was verified at the start of the visit. I conducted an interview, performed a limited exam by video and educated the patient on my assessment and plan. Due to this being a TeleHealth encounter (During [facility] public health emergency), evaluation of the following organ systems was limited: Vitals/Constitutional/EENT/Resp/CV/GI//MS/Neuro/Skin/Heme-Lymph-Imm. Pursuant to the emergency declaration under the [address] [place] [address] authority and the [employer] Act, this Virtual Visit was conducted with patient's (and/or legal guardian's) consent, to reduce the patient's risk of exposure to COVID-19 and provide necessary medical care. The patient (and/or legal guardian) has also been advised to contact this office for worsening conditions or problems, and seek emergency medical treatment and/or call 911 if deemed necessary. Total time spent on this encounter via Telehealth (synchronous, real-time audio/visual connection): 40 min  See assessment, plan and counseling note for counseling and care coordination details. Services were provided through a video synchronous discussion virtually to substitute for in-person clinic visit. Persons participating in the telehealth service: provider - David Gunter MD and patient Vanessa Fischer. Provider was located at her office. Patient was located at home.       --David Gunter MD on 3/19/2021 at 11:35 PM    An electronic signature was used to authenticate this note. Return in about 3 months (around 6/19/2021) for diabetes, thyroid problems.

## 2021-03-23 NOTE — TELEPHONE ENCOUNTER
The last prescribed insulin was Humalog. Please let me know what they can do to help. Not sure what is the problem.

## 2021-03-23 NOTE — TELEPHONE ENCOUNTER
PT states she is trying to get refill of Novolog and pharmacy has sent her back  to the office for help

## 2021-03-24 NOTE — TELEPHONE ENCOUNTER
Spoke with patient and patient had been getting novolog from patient assistance program.  Informed patient that office will reach out to [employer] patient assistance program to find out of anything is needed for patient.

## 2021-03-26 NOTE — TELEPHONE ENCOUNTER
Spoke with patient and informed that office has been in contact with patint assistance program with [employer]. Office was informed that patients on Medicare part D needs to re-enroll every January. Patient assistance program is mailing application to patient to be submitted. Patient expressed understanding. In the meantime patient will be running out of insulin and will need samples. Patient may only have 2-3 days left of insulin. Please advise.

## 2021-03-26 NOTE — TELEPHONE ENCOUNTER
Spoke with patient and informed that office is out of sample of Novolog but we have Humalog. Patient will PU samples of Humalog on Monday for [address].

## 2021-03-29 ENCOUNTER — TELEPHONE (OUTPATIENT)
Dept: ENDOCRINOLOGY | Age: 66
End: 2021-03-29

## 2021-03-29 NOTE — TELEPHONE ENCOUNTER
Patient stopped by the [place] office to PU Humalog samples while she waits for authorization from patient assistance program from [employer].

## 2021-04-06 NOTE — TELEPHONE ENCOUNTER
Please start PA for Humalog vials. Patient is on Medtronic MiniMed 630 G insulin pump. She cannot use pens for drawing insulin to the pump.

## 2021-04-07 NOTE — TELEPHONE ENCOUNTER
PA denied:    Unknown Ruben Cortes: Samantha [facility] - [place] Case ID: [id number] Need help? Call us at [phone]   Outcome   Deniedon April 6   Denied. This drug is not covered on the formulary. We are denying your request because we do not show that you have tried at least 2 covered drugs that can treat your condition.  Other covered drug(s) is/are: Fiasp FlexTouch subcutaneous solution pen-injector 100 unit/ml, Fiasp PenFill subcutaneous solution cartridge 100 unit/ml, Fiasp subcutaneous solution 100 unit/ml, Novolin 70/30 FlexPen subcutaneous suspension pen-injector 100 unit/ml, Novolin 70/30 subcutaneous suspension pen-injector 100 unit/ml, Novolin N FlexPen subcutaneous suspension pen-injector 100 unit/ml, Novolin N subcutaneous suspension 100 unit/ml, Novolin R FlexPen injection solution pen-injector 100 unit/ml, Novolin R injection solution 100 unit/ml, Novolog FlexPen subcutaneous solution pen-injector 100 unit/ml, Novolog Mix 70/30 FlexPen subcutaneous suspension pen-injector 100 unit/ml, Novolog Mix 70/30 subcutaneous suspension 100 unit/ml, Novolog PenFill subcutaneous solution cartridge 100 unit/ml, Novolog subcutaneous solution 100 unit/ml

## 2021-04-08 ENCOUNTER — TELEPHONE (OUTPATIENT)
Dept: ENDOCRINOLOGY | Age: 66
End: 2021-04-08

## 2021-04-08 NOTE — TELEPHONE ENCOUNTER
Angelica, I read the denial in details and the last insulin on that page is NovoLog vial insulin. Not sure why we changed it to Humalog? I believe it was because NovoLog was not supposed to be covered. Anyhow, they state that NovoLog is covered. I did put dispense as written and send it to pharmacy again. Let me know if any issues regarding this.

## 2021-04-14 ENCOUNTER — TELEPHONE (OUTPATIENT)
Dept: ENDOCRINOLOGY | Age: 66
End: 2021-04-14

## 2021-04-14 NOTE — TELEPHONE ENCOUNTER
Patients daughter called and said the patients pump sensor pod busted off of her and she has not had any insulin for 2 days now.  Please call patient

## 2021-04-14 NOTE — TELEPHONE ENCOUNTER
[phone] ([place])      Spoke with patient and she informed that Pod situation has been resolved.

## 2021-04-30 ENCOUNTER — TELEPHONE (OUTPATIENT)
Dept: ENDOCRINOLOGY | Age: 66
End: 2021-04-30

## 2021-04-30 NOTE — TELEPHONE ENCOUNTER
PT requests a call back to let her know if we were able to get her Insulin, Either Novolog or Humalog

## 2021-04-30 NOTE — TELEPHONE ENCOUNTER
Per insurance note, NovoLog vials were covered. Prescription was sent to pharmacy on 4/8/2021. Called patient and asked her to call insurance and ask why they are not covering NOvolog which is listed on their formulary. Also asked patient to call pharmacy to make sure that it is filled or not. She will call us on Monday. She has enough insulin until Monday.

## 2021-05-13 NOTE — TELEPHONE ENCOUNTER
PT called stated that [facility] is waiting to hear back from office so they can process the patients request. They are asking for a call to [phone] and the fax is [phone]

## 2021-05-13 NOTE — TELEPHONE ENCOUNTER
Spoke with [facility] and was notified that rx that was authorized on 5/5/2021 was not received by [facility] due to transmission failed through e-scribed. Therefore, verbal was given. Patient has been notified.

## 2021-06-22 ENCOUNTER — OFFICE VISIT (OUTPATIENT)
Dept: ENDOCRINOLOGY | Age: 66
End: 2021-06-22
Payer: MEDICARE

## 2021-06-22 VITALS
SYSTOLIC BLOOD PRESSURE: 142 MMHG | DIASTOLIC BLOOD PRESSURE: 62 MMHG | OXYGEN SATURATION: 97 % | HEART RATE: 77 BPM | BODY MASS INDEX: 37.91 KG/M2 | WEIGHT: 206 LBS | HEIGHT: 62 IN

## 2021-06-22 DIAGNOSIS — E04.2 MULTINODULAR GOITER: ICD-10-CM

## 2021-06-22 DIAGNOSIS — Z79.4 CONTROLLED TYPE 2 DIABETES MELLITUS WITH DIABETIC NEPHROPATHY, WITH LONG-TERM CURRENT USE OF INSULIN (HCC): ICD-10-CM

## 2021-06-22 DIAGNOSIS — E78.2 MIXED HYPERLIPIDEMIA: ICD-10-CM

## 2021-06-22 DIAGNOSIS — E11.21 CONTROLLED TYPE 2 DIABETES MELLITUS WITH DIABETIC NEPHROPATHY, WITH LONG-TERM CURRENT USE OF INSULIN (HCC): ICD-10-CM

## 2021-06-22 DIAGNOSIS — E66.9 OBESITY (BMI 35.0-39.9 WITHOUT COMORBIDITY): ICD-10-CM

## 2021-06-22 DIAGNOSIS — N18.5 CKD (CHRONIC KIDNEY DISEASE) STAGE 5, GFR LESS THAN 15 ML/MIN (HCC): ICD-10-CM

## 2021-06-22 DIAGNOSIS — E55.9 VITAMIN D DEFICIENCY: ICD-10-CM

## 2021-06-22 DIAGNOSIS — E11.40 TYPE 2 DIABETES, CONTROLLED, WITH NEUROPATHY (HCC): Primary | ICD-10-CM

## 2021-06-22 DIAGNOSIS — I10 ESSENTIAL HYPERTENSION: ICD-10-CM

## 2021-06-22 PROCEDURE — 99215 OFFICE O/P EST HI 40 MIN: CPT | Performed by: INTERNAL MEDICINE

## 2021-06-22 RX ORDER — ZINC GLUCONATE 50 MG
50 TABLET ORAL DAILY
COMMUNITY
End: 2022-01-13 | Stop reason: ALTCHOICE

## 2021-06-22 RX ORDER — MULTIVIT WITH MINERALS/LUTEIN
250 TABLET ORAL DAILY
COMMUNITY
End: 2022-06-07

## 2021-06-22 NOTE — PROGRESS NOTES
Josiah Peña is a 72 y.o. female who presents for Type 2 diabetes mellitus. Current symptoms/problems include hyperglycemia and show no change. 1.  Type 2 diabetes, controlled, with neuropathy ([address] [E11.40]    Diagnosed with Type 2 diabetes mellitus in 1968.     Comorbid conditions: hyperlipidemia, CKD and Neuropathy    Current diabetic medications include: Novolog    Intolerance to diabetes medications: No     Weight trend: stable  Prior visit with dietician: yes  Current diet: on average, 3 meals per day  Current exercise: active     Current monitoring regimen: home blood tests - 4 times daily for last 60 days  Has brought blood glucose log/meter:  Yes  Home blood sugar records: fasting range: 190 and postprandial range: 200  Any episodes of hypoglycemia? Rare  Hypoglycemia frequency and time(s):  twice a month  Does patient have Glucagon emergency kit? No  Does patient have rapid acting carbohydrate? Yes  Does patient wear a medic alert bracelet or necklace? No    2. Mixed hyperlipidemia  Has muscle pain. 3. Obesity   Eats healthy. 4. Essential hypertension  No headaches. 5. Vitamin D deficiency  No bone pain. 6. CKD (chronic kidney disease) stage 5  No urination problems. 7. MNG  No difficulty swallowing, voice change, history of radiation, thyroid cancer in family. 8. Nephropathy  Urinates 5-6 times a day    ULTRASOUND THYROID, 2/21/2020 12:06 PM.       INDICATION: Thyroid nodule. Goiter.       COMPARISON: Ultrasound thyroid, 10/5/2017.       TECHNIQUE: Multiple transverse and longitudinal gray scale images were obtained through the    thyroid gland.       FINDINGS:        Right lobe measures 2.2 x 2.5 x 5.3 cm. Left lobe measures 2.0 x 2.6 x 5.3 cm. Isthmus is 13 mm    in thickness. Thyroid gland is heterogeneous in echotexture throughout.  There is    redemonstration of multiple nodules within the bilateral lobes, numbering at least 5.       Largest nodule is located inferiorly within the left lobe and is TI-RADS Category 4. This    measures 1.9 x 2.1 x 2.4 cm compared to 1.2 x 1.7 x 2.2 cm previously. This lesion is    heterogeneous, is wider than tall, and has regions of hypoechoic and hyperechoic components. No    evidence of cystic change or microcalcifications.       Largest nodule in the right lobe measures 1.6 x 2.3 x 2.6 cm. This nodule is TI-RADS Category    4, wider than tall, and mildly heterogeneous with a few foci of microcalcification. This nodule    previously measured 1.2 x 1.7 x 2.2 cm. No definite new nodule.           IMPRESSION:        BILATERAL TI-RADS CATEGORY 4 LESIONS SHOWING MILD INCREASE IN SIZE AS COMPARED TO PREVIOUS    STUDY 2017. CORRELATION WITH SHORT INTERVAL FOLLOW-UP OR FINE-NEEDLE ASPIRATION MAY BE HELPFUL    FOR FURTHER EVALUATION. PATIENT HAS PREVIOUSLY UNDERGONE BIOPSY OF MULTIPLE NODULES IN .       ENDY Cuba M.D. Workstation KL:[id number]         CYTOLOGY REPORT  ______________________________________________________________________  Patient Name: Elías Dawson    Accession #:        Location: [facility]. #:  [id number]  Account [de-identified]  Soc. Sec. #:         : 1955 (Age: 60)   Gender: F  Physician(s): Brendan Bee M.D. Collected: 3/1/2016  Received:  3/1/2016  ______________________________________________________________________  Source of Specimen(s)  1 of 4. Thyroid, Fine Needle , Right Upper    Clinical History  Pertinent Clinical History:   Other Clinical Conditions:    Clinical Diagnosis:  Thyroid Nodules    ______________________________________________________________________   FINAL CYTOLGIC DIAGNOSIS    1 of 4. Thyroid, Fine Needle , Right Upper:       Satisfactory for evaluation.         Benign. Colloid nodule.               ______________________________________________________________________  Cytotechnologist: DL    Electronically Lanny Phalen.  Ceci, MD  Pathologist  3/2/2016      Procedures/Addenda  FNA Immediate Specimen Evaluation     Date Ordered:     3/1/2016      Status: Signed Out        Date Complete:     3/1/2016     By: Belle Schmidt       Date Reported:     3/2/2016         Preliminary Diagnosis  - Thyroid epithelial cells and colloid sufficient for evaluation. Results-Comments             Rosa Shelton. Diane Davies MD        Gross Description    Received in Cytolyt.  30ml red, cloudy fluid. 8 prepared slides 4 airdried, 4 fixed    ______________________________________________________________________                                   CPT Code(s): 1: F8695939, [id number], [id number])           CYTOLOGY REPORT  ______________________________________________________________________  Patient Name: Druscilla Councilman    Accession #:        Location: [facility]. Rec. #:  [id number]  Account [de-identified]  Soc. Sec. #:         : 1955 (Age: 60)   Gender: F  Physician(s): Marquis Rivero M.D. Collected: 3/1/2016  Received:  3/1/2016  ______________________________________________________________________  Source of Specimen(s)  2 of 4. Thyroid Fine Needle Aspiration, Right Lower    Clinical History  Pertinent Clinical History:   Other Clinical Conditions:    Clinical Diagnosis:  Thyroid nodules         ______________________________________________________________________   FINAL CYTOLGIC DIAGNOSIS    2 of 4. Thyroid Fine Needle Aspiration, Right Lower:       Satisfactory for evaluation.         Benign. Adenomatoid or colloid nodule. Chronic lymphocytic thyroiditis.              ______________________________________________________________________  Cytotechnologist: DL    Electronically Nisha Early.  Diane Davies MD  Pathologist  3/2/2016      Procedures/Addenda  FNA Immediate Specimen Evaluation     Date Ordered:     3/1/2016      Status: Signed Out        Date Complete:     3/1/2016     By: Belle Schmidt       Date Reported:     3/2/2016         Preliminary Diagnosis  - Thyroid epithelial cells and colloid sufficient for evaluation. Results-Comments             Coit Profit. Tai Juarez MD        Gross Description  Received in Cytolyt.  30ml red, cloudy fluid. 8 prepared slides 4 airdried, 4 fixed    ______________________________________________________________________                                   CPT Code(s): 1: C2499826, [id number], [id number])           CYTOLOGY REPORT  ______________________________________________________________________  Patient Name: Salvador Alejandro    Accession #:        Location: [facility]     Med. Rec. #:  [id number]  Account [de-identified]  Soc. Sec. #:         : 1955 (Age: 60)   Gender: F  Physician(s): Daphney Spaulding M.D. Collected: 3/1/2016  Received:  3/1/2016  ______________________________________________________________________  Source of Specimen(s)  3 of 4. Thyroid, Fine Needle Aspiration, Isthmus    Clinical History  Pertinent Clinical History:   Other Clinical Conditions:    Clinical Diagnosis:  Thyroid nodules    ______________________________________________________________________   FINAL CYTOLGIC DIAGNOSIS    3  4. Thyroid, Fine Needle Aspiration, Isthmus:       Satisfactory for evaluation.         Benign. Colloid nodule.                 ______________________________________________________________________  Cytotechnologist: SHRUTI    Electronically Rosie Whipple. Tai Juarez MD  Pathologist  3/2/2016      Procedures/Addenda  FNA Immediate Specimen Evaluation     Date Ordered:     3/1/2016      Status: Signed Out        Date Complete:     3/1/2016     By: José Meng       Date Reported:     3/2/2016         Preliminary Diagnosis  - Thyroid epithelial cells and colloid sufficient for evaluation. Results-Comments             Coit Profit. Tai Juarez MD        Gross Description  Received in Cytolyt.  30ml red, cloudy fluid.   8 prepared slides 4 airdried, 4 Lack of Transportation (Medical):  Lack of Transportation (Non-Medical):    Physical Activity:     Days of Exercise per Week:     Minutes of Exercise per Session:    Stress:     Feeling of Stress :    Social Connections:     Frequency of Communication with Friends and Family:     Frequency of Social Gatherings with Friends and Family:     Attends Restoration[employer] Services:     Active Member of Clubs or Organizations:     Attends Club or Organization Meetings:     Marital Status:    Intimate Partner Violence:     Fear of Current or Ex-Partner:     Emotionally Abused:     Physically Abused:     Sexually Abused:      Family History   Problem Relation Age of Onset    Diabetes Mother     Heart Attack Mother     Cancer Father     No Known Problems Daughter     No Known Problems Daughter      Current Outpatient Medications   Medication Sig Dispense Refill    zinc gluconate 50 MG tablet Take 50 mg by mouth daily      Ascorbic Acid (VITAMIN C) 250 MG tablet Take 250 mg by mouth daily      NOVOLOG 100 UNIT/ML injection vial Total daily dose 60 units daily, use via insulin pump 2 vial 3    calcitRIOL (ROCALTROL) 0.25 MCG capsule Take 0.25 mcg by mouth daily      hydrALAZINE (APRESOLINE) 50 MG tablet Take 1 tablet by mouth 2 times daily      CONTOUR NEXT TEST strip 1 each by In Vitro route 4 times daily As needed. 375 strip 3    furosemide (LASIX) 80 MG tablet Take 160 mg by mouth 2 times daily       pravastatin (PRAVACHOL) 20 MG tablet Take 1 tablet by mouth daily 30 tablet 3    metoprolol tartrate (LOPRESSOR) 50 MG tablet Take 50 mg by mouth 2 times daily      amLODIPine (NORVASC) 5 MG tablet Take 5 mg by mouth daily      vitamin D (CHOLECALCIFEROL) 5000 units CAPS capsule Take 5,000 Units by mouth daily       No current facility-administered medications for this visit.      Allergies   Allergen Reactions    Fenofibrate Other (See Comments)     Muscle cramps in legs    Ace Inhibitors      Other reaction(s): Cough     Family Status   Relation Name Status    Mother      Father      Anastasiia  Alive    Anastasiia  Alive       Review of Systems  Constitutional: no fatigue, no fever, no recent weight gain, no recent weight loss, no changes in appetite  Eyes: no eye pain, no change in vision, no eye redness, no eye irritation, no double vision  Ears, nose, throat: no nasal congestion, no sore throat, no earache, no decrease in hearing, no hoarseness, no dry mouth, no sinus problems, no difficulty swallowing, no neck lumps, no dental problems, no mouth sores, no ringing in ears  Pulmonary: no shortness of breath, no wheezing, no dyspnea on exertion, no cough  Cardiovascular: no chest pain, no lower extremity edema, no orthopnea, no intermittent leg claudication, no palpitations  Gastrointestinal: no abdominal pain, no nausea, no vomiting, no diarrhea, no constipation, no dysphagia, no heartburn, no bloating  Genitourinary: no dysuria, no urinary incontinence, no urinary hesitancy, no urinary frequency, no feelings of urinary urgency, no nocturia  Musculoskeletal: no joint swelling, no joint stiffness, has joint pain, no muscle cramps, no muscle pain, no bone pain  Integument/Breast: no hair loss, no skin rashes, no skin lesions, no itching, no dry skin  Neurological: no numbness, no tingling, no weakness, no confusion, no headaches, no dizziness, no fainting, no tremors, no decrease in memory, has balance problems  Psychiatric: no anxiety, no depression, no insomnia  Hematologic/Lymphatic: no tendency for easy bleeding, no swollen lymph nodes, no tendency for easy bruising  Immunology: no seasonal allergies, no frequent infections, no frequent illnesses  Endocrine: no temperature intolerance     OBJECTIVE:  BP (!) 142/62   Pulse 77   Ht 5' 2\" (1.575 m)   Wt 206 lb (93.4 kg)   SpO2 97%   BMI 37.68 kg/m²      Wt Readings from Last 3 Encounters:   21 206 lb (93.4 kg)   20 201 lb 3.2 oz (91.3 kg)   11/25/19 201 lb (91.2 kg)         Constitutional: no acute distress, well appearing and well nourished  Psychiatric: oriented to person, place and time, judgement and insight and normal, recent and remote memory and intact and mood and affect are normal  Skin: skin and subcutaneous tissue is normal without mass, normal turgor  Head and Face: examination of head and face revealed no abnormalities  Eyes: no lid or conjunctival swelling, erythema or discharge, pupils are normal, equal, round, reactive to light  Ears/Nose: external inspection of ears and nose revealed no abnormalities, hearing is grossly normal  Oropharynx/Mouth/Face: lips, tongue and gums are normal with no lesions, the voice quality was normal  Neck: neck is supple and symmetric, with midline trachea and no masses, thyroid is normal  Lymphatics: normal cervical lymph nodes, normal supraclavicular nodes  Pulmonary: no increased work of breathing or signs of respiratory distress, lungs are clear to auscultation  Cardiovascular: normal heart rate and rhythm, normal S1 and S2, no murmurs and pedal pulses and 2+ bilaterally, no edema  Abdomen: abdomen is soft, non-tender with no masses  Musculoskeletal: normal gait and station and exam of the digits and nails are normal  Neurological: normal coordination and normal general cortical function    Visual inspection:  Deformity/amputation: absent  Skin lesions/pre-ulcerative calluses: absent  Edema: right- negative, left- negative     Sensory exam:  Monofilament sensation: normal  (minimum of 5 random plantar locations tested, avoiding callused areas - > 1 area with absence of sensation is + for neuropathy)     Plus at least one of the following:  Pulses: normal  Proprioception: Intact  Vibration (128 Hz): Impaired      ASSESSMENT/PLAN:  1.  Type 2 diabetes mellitus, controlled, with neuropathy ([facility])  Hemoglobin A1c 6.6-6.3-6.7  Average blood glucose 211±62  Basal amount 70%  Bolus amount 30%  Change pump settings as follows:  Midnight 0.8 units/h, 6 AM 1.4 units/h 12 PM 1.45 units/h 4 PM 1.45 units/h 7 PM 1.60 units/h  - insulin aspart (NOVOLOG) 100 UNIT/ML injection vial; Inject up to 60 units subcutaneously daily via insulin pump. Dispense: 2 vial; Refill: 3  - Dulaglutide 0.75 MG/0.5ML SOPN; Inject 1.5 mg into the skin once a week  Dispense: 12 pen; Refill: 1  - Comprehensive Metabolic Panel; Future  - Hemoglobin A1C; Future    2. Mixed hyperlipidemia    LDL cholesterol 79-61-64  Had muscle pain. Pravastatin 20 mg daily  - Lipid Panel; Future  Take fish oil  , 98, 106, 79. Diet. 3. Obesity   Diet. 4. Essential hypertension  Current meds. 5. Vitamin D deficiency  25 hydroxy vitamin D 62-47-40  Vitamin D 25 Hydroxy; Future    6. CKD (chronic kidney disease) stage 5  Nephrology follow up. On HD     7. MNG  TSH 2.0  I recommended FNA of the right 2.6 cm nodule, left 2.4 cm nodules, increased in size  Refused another biopsy, prefers monitoring. Understands risk of thyroid cancer and thyroid nodules  - Thyroid sonogram    8. Nephropathy  Follow with Nephrology. Reviewed and/or ordered clinical lab results Yes  Reviewed and/or ordered radiology tests No  Reviewed and/or ordered other diagnostic tests No  Discussed test results with performing physician No  Independently reviewed image, tracing, or specimen Yes Insulin pump tracing, 7 pages of data.    Recommendations and assessment and plan  Made a decision to obtain old records Yes  Reviewed old records No  Obtained history from other than patient Kaley Montez was counseled regarding symptoms of diabetes diagnosis, course and complications of disease if inadequately treated, side effects of medications, diagnosis, treatment options, and prognosis, risks, benefits, complications, and alternatives of treatment, labs, imaging and other studies and treatment targets and goals, thyroid cancer risk, insulin pump data, settings,

## 2021-06-24 ENCOUNTER — TELEPHONE (OUTPATIENT)
Dept: ENDOCRINOLOGY | Age: 66
End: 2021-06-24

## 2021-06-24 NOTE — TELEPHONE ENCOUNTER
PA with Debra for Novolog. Sent for further review . Turn around time 72 hours. Case number [id number].

## 2021-07-06 ENCOUNTER — TELEPHONE (OUTPATIENT)
Dept: ENDOCRINOLOGY | Age: 66
End: 2021-07-06

## 2021-07-06 DIAGNOSIS — E04.2 MULTINODULAR GOITER: Primary | ICD-10-CM

## 2021-07-06 NOTE — TELEPHONE ENCOUNTER
Pt had an ultrasound with serious findings. ... patient is currently waiting at facility; they want to know what Dr. Oswald Allen would like to do, considering the results. Would like a call back as soon as possible.

## 2021-07-06 NOTE — TELEPHONE ENCOUNTER
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 Patient was prescribed blood thinner and is currently at home. Recommend biopsy of 3 nodules as per radiology recommendation. Please mail the order to the patient. She will check regarding duration of blood thinning therapy and will make a decision when to get biopsy done.

## 2021-08-21 LAB
ALBUMIN/GLOBULIN RATIO: 1.6 RATIO (ref 0.8–2.6)
ALBUMIN: 3.9 G/DL (ref 3.5–5.2)
ALP BLD-CCNC: 89 U/L (ref 23–144)
ALT SERPL-CCNC: 10 U/L (ref 0–60)
AST SERPL-CCNC: 9 U/L (ref 0–55)
BILIRUB SERPL-MCNC: 0.2 MG/DL (ref 0–1.2)
BUN BLDV-MCNC: 28 MG/DL (ref 3–29)
BUN/CREAT BLD: 6 (ref 7–25)
CALCIUM SERPL-MCNC: 10.1 MG/DL (ref 8.5–10.5)
CHLORIDE BLD-SCNC: 98 MEQ/L (ref 96–110)
CHOLESTEROL: 179 MG/DL
CO2: 21 MEQ/L (ref 19–32)
CREAT SERPL-MCNC: 5 MG/DL (ref 0.5–1.2)
CREATININE URINE: 34.8 MG/DL
GFR AFRICAN AMERICAN: 9 MLS/MIN/1.73M2
GFR NON-AFRICAN AMERICAN: 8 MLS/MIN/1.73M2
GLOBULIN: 2.4 G/DL (ref 1.9–3.6)
GLUCOSE BLD-MCNC: 217 MG/DL (ref 70–99)
HBA1C MFR BLD: 8.2 % (ref 4–6)
HDLC SERPL-MCNC: 35 MG/DL
LDL CHOLESTEROL CALCULATED: 103 MG/DL
MICROALBUMIN UR-MCNC: ABNORMAL MCG/DL
MICROALBUMIN/CREAT UR-RTO: 736 MCG/MG CREAT.
POTASSIUM SERPL-SCNC: 5.1 MEQ/L (ref 3.4–5.3)
SODIUM BLD-SCNC: 135 MEQ/L (ref 135–148)
STATUS: ABNORMAL
T3 FREE: 2.6 PG/ML (ref 2.3–4.2)
T4 FREE: 1.46 NG/DL (ref 0.8–1.8)
TOTAL PROTEIN: 6.3 G/DL (ref 6–8.3)
TRIGL SERPL-MCNC: 203 MG/DL
TSH SERPL DL<=0.05 MIU/L-ACNC: 1.01 MCIU/ML (ref 0.4–4.5)
VITAMIN D 25-HYDROXY: 51 NG/ML (ref 30–100)
VLDLC SERPL CALC-MCNC: 41 MG/DL (ref 4–38)

## 2021-09-09 DIAGNOSIS — E11.40 TYPE 2 DIABETES, CONTROLLED, WITH NEUROPATHY (HCC): Primary | ICD-10-CM

## 2021-09-09 NOTE — TELEPHONE ENCOUNTER
Requested Prescriptions     Pending Prescriptions Disp Refills    NOVOLOG 100 UNIT/ML injection vial [Pharmacy Med Name: Garret Rakesh 100U/ML] 20 mL 3     Sig: TOTAL DAILY DOSE 60 UNITS  DAILY.  USE VIA INSULIN PUMP     LAST OV 6/22/21  NEXT OV 9/28/21   LAST REFILL : 8/23/2021  RECENT LAB 8/21/21

## 2021-09-28 ENCOUNTER — OFFICE VISIT (OUTPATIENT)
Dept: ENDOCRINOLOGY | Age: 66
End: 2021-09-28
Payer: MEDICARE

## 2021-09-28 VITALS
TEMPERATURE: 98 F | OXYGEN SATURATION: 98 % | HEIGHT: 62 IN | BODY MASS INDEX: 37.17 KG/M2 | RESPIRATION RATE: 14 BRPM | WEIGHT: 202 LBS | DIASTOLIC BLOOD PRESSURE: 60 MMHG | HEART RATE: 73 BPM | SYSTOLIC BLOOD PRESSURE: 128 MMHG

## 2021-09-28 DIAGNOSIS — N18.5 CKD (CHRONIC KIDNEY DISEASE) STAGE 5, GFR LESS THAN 15 ML/MIN (HCC): ICD-10-CM

## 2021-09-28 DIAGNOSIS — E66.9 OBESITY (BMI 35.0-39.9 WITHOUT COMORBIDITY): ICD-10-CM

## 2021-09-28 DIAGNOSIS — I10 ESSENTIAL HYPERTENSION: ICD-10-CM

## 2021-09-28 DIAGNOSIS — E55.9 VITAMIN D DEFICIENCY: ICD-10-CM

## 2021-09-28 DIAGNOSIS — E78.2 MIXED HYPERLIPIDEMIA: ICD-10-CM

## 2021-09-28 DIAGNOSIS — E04.2 MULTINODULAR GOITER: ICD-10-CM

## 2021-09-28 PROCEDURE — 3052F HG A1C>EQUAL 8.0%<EQUAL 9.0%: CPT | Performed by: INTERNAL MEDICINE

## 2021-09-28 PROCEDURE — 99215 OFFICE O/P EST HI 40 MIN: CPT | Performed by: INTERNAL MEDICINE

## 2021-09-28 RX ORDER — PRAVASTATIN SODIUM 20 MG
20 TABLET ORAL DAILY
Qty: 30 TABLET | Refills: 3
Start: 2021-09-28 | End: 2022-06-07 | Stop reason: SINTOL

## 2021-09-28 RX ORDER — DULAGLUTIDE 0.75 MG/.5ML
0.75 INJECTION, SOLUTION SUBCUTANEOUS WEEKLY
Qty: 12 PEN | Refills: 1 | Status: SHIPPED | OUTPATIENT
Start: 2021-09-28 | End: 2022-01-13

## 2021-09-28 NOTE — PROGRESS NOTES
María Elena Allen is a 72 y.o. female who presents for Type 2 diabetes mellitus. Current symptoms/problems include hyperglycemia and show no change. 1.  DM type 2, uncontrolled, with neuropathy ([facility] [address] [E11.40, E11.65]    Diagnosed with Type 2 diabetes mellitus in 1968.     Comorbid conditions: hyperlipidemia, CKD and Neuropathy    Current diabetic medications include: Novolog    Intolerance to diabetes medications: No     Weight trend: stable  Prior visit with dietician: yes  Current diet: on average, 3 meals per day  Current exercise: active     Current monitoring regimen: home blood tests - 4 times daily for last 60 days  Has brought blood glucose log/meter:  Yes  Home blood sugar records: fasting range: 190 and postprandial range: 200  Any episodes of hypoglycemia? Rare  Hypoglycemia frequency and time(s):  twice a month  Does patient have Glucagon emergency kit? No  Does patient have rapid acting carbohydrate? Yes  Does patient wear a medic alert bracelet or necklace? No    2. Mixed hyperlipidemia  Has muscle pain. 3. Obesity   Eats healthy. 4. Essential hypertension  No headaches. 5. Vitamin D deficiency  No bone pain. 6. CKD (chronic kidney disease) stage 5  No urination problems. 7. MNG  No difficulty swallowing, voice change, history of radiation, thyroid cancer in family. 8. Nephropathy  Urinates 5-6 times a day    ULTRASOUND THYROID, 2/21/2020 12:06 PM.       INDICATION: Thyroid nodule. Goiter.       COMPARISON: Ultrasound thyroid, 10/5/2017.       TECHNIQUE: Multiple transverse and longitudinal gray scale images were obtained through the    thyroid gland.       FINDINGS:        Right lobe measures 2.2 x 2.5 x 5.3 cm. Left lobe measures 2.0 x 2.6 x 5.3 cm. Isthmus is 13 mm    in thickness. Thyroid gland is heterogeneous in echotexture throughout.  There is    redemonstration of multiple nodules within the bilateral lobes, numbering at least 5.       Largest nodule is located Corazon Tao MD  Pathologist  3/2/2016      Procedures/Addenda  FNA Immediate Specimen Evaluation     Date Ordered:     3/1/2016      Status: Signed Out        Date Complete:     3/1/2016     By: Mor Potts       Date Reported:     3/2/2016         Preliminary Diagnosis  - Thyroid epithelial cells and colloid sufficient for evaluation. Results-Comments             Heidi Person. Corazon Tao MD        Gross Description    Received in Cytolyt.  30ml red, cloudy fluid. 8 prepared slides 4 airdried, 4 fixed    ______________________________________________________________________                                   CPT Code(s): 1: L0159897, J1761500, [id number])           CYTOLOGY REPORT  ______________________________________________________________________  Patient Name: Enrique     Accession #:        Location: [id number]     Med. Rec. #:  [id number]  Account [de-identified]  Soc. Sec. #:         : 1955 (Age: 60)   Gender: F  Physician(s): Martin Machado M.D. Collected: 3/1/2016  Received:  3/1/2016  ______________________________________________________________________  Source of Specimen(s)  2 of 4. Thyroid Fine Needle Aspiration, Right Lower    Clinical History  Pertinent Clinical History:   Other Clinical Conditions:    Clinical Diagnosis:  Thyroid nodules         ______________________________________________________________________   FINAL CYTOLGIC DIAGNOSIS    2 of 4. Thyroid Fine Needle Aspiration, Right Lower:       Satisfactory for evaluation.         Benign. Adenomatoid or colloid nodule. Chronic lymphocytic thyroiditis.              ______________________________________________________________________  Cytotechnologist: DL    Electronically Valentina Sevilla.  Corazon Tao MD  Pathologist  3/2/2016      Procedures/Addenda  FNA Immediate Specimen Evaluation     Date Ordered:     3/1/2016      Status: Signed Out        Date Complete:     3/1/2016     By: Mor Potts       Date Reported:     3/2/2016         Preliminary Diagnosis  - Thyroid epithelial cells and colloid sufficient for evaluation. Results-Comments             Deward Reach. Selene Muñiz MD        Gross Description  Received in Cytolyt.  30ml red, cloudy fluid. 8 prepared slides 4 airdried, 4 fixed    ______________________________________________________________________                                   CPT Code(s): 1: B8174379, [id number], P0195014)           CYTOLOGY REPORT  ______________________________________________________________________  Patient Name: Aden Bernabe    Accession #:        Location: [facility]     Med. Rec. #:  [id number]  Account [de-identified]  Soc. Sec. #:         : 1955 (Age: 60)   Gender: F  Physician(s): Kevin Butler M.D. Collected: 3/1/2016  Received:  3/1/2016  ______________________________________________________________________  Source of Specimen(s)  3 of 4. Thyroid, Fine Needle Aspiration, Isthmus    Clinical History  Pertinent Clinical History:   Other Clinical Conditions:    Clinical Diagnosis:  Thyroid nodules    ______________________________________________________________________   FINAL CYTOLGIC DIAGNOSIS    3 of 4. Thyroid, Fine Needle Aspiration, Isthmus:       Satisfactory for evaluation.         Benign. Colloid nodule.                 ______________________________________________________________________  Cytotechnologist: DL    Electronically Renetta Patel. Selene Muñiz MD  Pathologist  3/2/2016      Procedures/Addenda  FNA Immediate Specimen Evaluation     Date Ordered:     3/1/2016      Status: Signed Out        Date Complete:     3/1/2016     By: Verito Barrios       Date Reported:     3/2/2016         Preliminary Diagnosis  - Thyroid epithelial cells and colloid sufficient for evaluation. Results-Comments             Deward Reach. Selene Muñiz MD        Gross Description  Received in Cytolyt.  30ml red, cloudy fluid.   8 prepared slides 4 airdried, 4 fixed    ______________________________________________________________________                                   CPT Code(s): 1: 26032, T7801756, [id number])           CYTOLOGY REPORT  ______________________________________________________________________  Patient Name: Loren Menjivar    Accession #:        Location: [facility] Rec. #:  [id number]  Account [de-identified]  Soc. Sec. #:         : 1955 (Age: 60)   Gender: F  Physician(s): Gricelda Boucher M.D. Collected: 3/1/2016  Received:  3/1/2016  ______________________________________________________________________  Source of Specimen(s)  . Thyroid, Fine Needle Aspiration, Left mid    Clinical History  Pertinent Clinical History:   Other Clinical Conditions:    Clinical Diagnosis:  Thyroid nodules    ______________________________________________________________________   FINAL CYTOLGIC DIAGNOSIS    4  4. Thyroid, Fine Needle Aspiration, Left mid:       Satisfactory for evaluation.         Benign. Colloid nodule. .               ______________________________________________________________________  Cytotechnologist: DL    Electronically Meera Monroy. Naz Hussein MD  Pathologist  3/2/2016      Procedures/Addenda  FNA Immediate Specimen Evaluation     Date Ordered:     3/1/2016      Status: Signed Out        Date Complete:     3/1/2016     By: Michael Montez       Date Reported:     3/2/2016         Preliminary Diagnosis  - Thyroid epithelial cells and colloid sufficient for evaluation. Results-Comments             Ave Dave. Naz Hussein MD        Gross Description  Received in Cytolyt.  30ml red, cloudy fluid.   8 prepared slides 4 airdried, 4 fixed    ______________________________________________________________________                                   CPT Code(s): 1: F1710434, R7367335, [id number])           Lab Results   Component Value Date    LABA1C 8.2 (H) 2021     No results found for: EAG    IMPRESSION:    1.  Slight asymmetric enlargement of the right lobe of the thyroid gland as compared to the left lobe of the thyroid gland. There is a heterogeneous echotexture demonstrated in each lobe of the thyroid gland as well as along the isthmus. 2.  There are 3 nodules demonstrated in the right lobe of the thyroid gland which have not substantially changed in size compared to the previous ultrasound study. An additional nodule in the left lobe of the thyroid gland and along the isthmus have also not substantially changed in size. Workstation [id number]   Result Narrative   STUDY:  Ultrasound examination of the thyroid gland. CLINICAL:   Follow-up of thyroid gland nodules    COMPARISON:   2/1/2016    TECHNIQUE:   Ultrasound examination of the thyroid gland is performed with grayscale and with color Doppler. FINDINGS:     The right lobe of the thyroid gland measures 6.0 x 2.0 x 2.9 cm. On the previous ultrasound study the right lobe measured 5.3 x 2.2 x 2.8 cm. There is a diffusely heterogeneous echotexture in the right lobe of the thyroid gland. There is a well-defined hypoechoic nodule along the upper portion of the right lobe measuring 0.6 x 0.9 x 0.5 cm. There is a solid nodule in the midportion of the right lobe measuring 2.6 x 2.1 x 2.5 cm. This has slightly ill-defined margins. Inferiorly there is an additional solid nodule measuring 1.8 x 2.2 x 1.2 cm which may have a small amount of calcification along the wall. On the previous ultrasound study, the largest nodule in the midportion of the right lobe measured up to 2.8 cm and the nodule in the lower pole measured up to 2.4 cm indicating no substantial interval change in size. The left lobe of the thyroid gland currently measures 4.9 x 2.3 x 2.3 cm. On the previous ultrasound study the left lobe measured 5.4 x 2.8 x 2.6 cm. There is a heterogeneous echotexture in the left lobe of the thyroid gland.  There is a solid nodule in the mid to lower portion of the left lobe measuring 1.7 x 2.8 x 1.5 cm. On the previous study this measured up to 2.9 cm indicating no substantial interval change. The isthmus measures 6 mm in thickness. There is a nodule along the anterior aspect of the isthmus measuring 1.0 x 0.8 x 0.8 cm. On the previous ultrasound this measured up to 1 cm indicating no significant interval change.          Past Medical History:   Diagnosis Date    Anemia     Eye pressure     Hypertension     Kidney failure     Type 2 diabetes mellitus without complication (HCC)       Patient Active Problem List   Diagnosis    Type 2 diabetes, controlled, with neuropathy ([address]    Mixed hyperlipidemia    Obesity (BMI 35.0-39.9 without comorbidity)    Essential hypertension    Vitamin D deficiency    CKD (chronic kidney disease) stage 5, GFR less than 15 ml/min (HCC)    Multinodular goiter    Controlled type 2 diabetes mellitus with diabetic nephropathy (HCC)    DM type 2, uncontrolled, with neuropathy ([address]    Uncontrolled type 2 diabetes mellitus with diabetic nephropathy (HCC)     Past Surgical History:   Procedure Laterality Date    COLONOSCOPY       Social History     Socioeconomic History    Marital status:      Spouse name: Not on file    Number of children: Not on file    Years of education: Not on file    Highest education level: Not on file   Occupational History    Not on file   Tobacco Use    Smoking status: Never Smoker    Smokeless tobacco: Never Used   Vaping Use    Vaping Use: Never used   Substance and Sexual Activity    Alcohol use: No    Drug use: No    Sexual activity: Never   Other Topics Concern    Not on file   Social History Narrative    Not on file     Social Determinants of Health     Financial Resource Strain:     Difficulty of Paying Living Expenses:    Food Insecurity:     Worried About Running Out of Food in the Last Year:     [place] of Food in the Last Year:    Transportation Needs:     Lack of Transportation (Medical):  Lack of Transportation (Non-Medical):    Physical Activity:     Days of Exercise per Week:     Minutes of Exercise per Session:    Stress:     Feeling of Stress :    Social Connections:     Frequency of Communication with Friends and Family:     Frequency of Social Gatherings with Friends and Family:     Attends [employer] Services:     Active Member of Clubs or Organizations:     Attends Club or Organization Meetings:     Marital Status:    Intimate Partner Violence:     Fear of Current or Ex-Partner:     Emotionally Abused:     Physically Abused:     Sexually Abused:      Family History   Problem Relation Age of Onset    Diabetes Mother     Heart Attack Mother     Cancer Father     No Known Problems Daughter     No Known Problems Daughter      Current Outpatient Medications   Medication Sig Dispense Refill    pravastatin (PRAVACHOL) 20 MG tablet Take 1 tablet by mouth daily 30 tablet 3    NOVOLOG 100 UNIT/ML injection vial TOTAL DAILY DOSE 60 UNITS  DAILY. USE VIA INSULIN PUMP 20 mL 3    Ascorbic Acid (VITAMIN C) 250 MG tablet Take 250 mg by mouth daily      calcitRIOL (ROCALTROL) 0.25 MCG capsule Take 0.25 mcg by mouth daily      hydrALAZINE (APRESOLINE) 50 MG tablet Take 1 tablet by mouth 2 times daily      CONTOUR NEXT TEST strip 1 each by In Vitro route 4 times daily As needed. 375 strip 3    furosemide (LASIX) 80 MG tablet Take 160 mg by mouth 2 times daily       metoprolol tartrate (LOPRESSOR) 50 MG tablet Take 50 mg by mouth 2 times daily      amLODIPine (NORVASC) 5 MG tablet Take 5 mg by mouth daily      vitamin D (CHOLECALCIFEROL) 5000 units CAPS capsule Take 5,000 Units by mouth daily      zinc gluconate 50 MG tablet Take 50 mg by mouth daily (Patient not taking: Reported on 9/28/2021)       No current facility-administered medications for this visit.      Allergies   Allergen Reactions    Fenofibrate Other (See Comments)     Muscle cramps in legs    Ace Inhibitors      Other reaction(s): Cough     Family Status   Relation Name Status    Mother      Father      Anastasiia  Alive    Anastasiia  Alive       Review of Systems  Constitutional: no fatigue, no fever, no recent weight gain, no recent weight loss, no changes in appetite  Eyes: no eye pain, no change in vision, no eye redness, no eye irritation, no double vision  Ears, nose, throat: no nasal congestion, no sore throat, no earache, no decrease in hearing, no hoarseness, no dry mouth, no sinus problems, no difficulty swallowing, no neck lumps, no dental problems, no mouth sores, no ringing in ears  Pulmonary: no shortness of breath, no wheezing, no dyspnea on exertion, no cough  Cardiovascular: no chest pain, no lower extremity edema, no orthopnea, no intermittent leg claudication, no palpitations  Gastrointestinal: no abdominal pain, no nausea, no vomiting, no diarrhea, no constipation, no dysphagia, no heartburn, no bloating  Genitourinary: no dysuria, no urinary incontinence, no urinary hesitancy, no urinary frequency, no feelings of urinary urgency, no nocturia  Musculoskeletal: no joint swelling, no joint stiffness, has joint pain, no muscle cramps, no muscle pain, no bone pain  Integument/Breast: no hair loss, no skin rashes, no skin lesions, no itching, no dry skin  Neurological: no numbness, no tingling, no weakness, no confusion, no headaches, no dizziness, no fainting, no tremors, no decrease in memory, has balance problems  Psychiatric: no anxiety, no depression, no insomnia  Hematologic/Lymphatic: no tendency for easy bleeding, no swollen lymph nodes, no tendency for easy bruising  Immunology: no seasonal allergies, no frequent infections, no frequent illnesses  Endocrine: no temperature intolerance     OBJECTIVE:  /60   Pulse 73   Temp 98 °F (36.7 °C)   Resp 14   Ht 5' 2\" (1.575 m)   Wt 202 lb (91.6 kg)   SpO2 98%   BMI 36.95 kg/m²      Wt Readings from Last 3 Encounters:   09/28/21 202 lb (91.6 kg)   06/22/21 206 lb (93.4 kg)   02/27/20 201 lb 3.2 oz (91.3 kg)         Constitutional: no acute distress, well appearing and well nourished  Psychiatric: oriented to person, place and time, judgement and insight and normal, recent and remote memory and intact and mood and affect are normal  Skin: skin and subcutaneous tissue is normal without mass, normal turgor  Head and Face: examination of head and face revealed no abnormalities  Eyes: no lid or conjunctival swelling, erythema or discharge, pupils are normal, equal, round, reactive to light  Ears/Nose: external inspection of ears and nose revealed no abnormalities, hearing is grossly normal  Oropharynx/Mouth/Face: lips, tongue and gums are normal with no lesions, the voice quality was normal  Neck: neck is supple and symmetric, with midline trachea and no masses, thyroid is normal  Lymphatics: normal cervical lymph nodes, normal supraclavicular nodes  Pulmonary: no increased work of breathing or signs of respiratory distress, lungs are clear to auscultation  Cardiovascular: normal heart rate and rhythm, normal S1 and S2, no murmurs and pedal pulses and 2+ bilaterally, no edema  Abdomen: abdomen is soft, non-tender with no masses  Musculoskeletal: normal gait and station and exam of the digits and nails are normal  Neurological: normal coordination and normal general cortical function      ASSESSMENT/PLAN:  1. Type 2 diabetes mellitus, uncontrolled, with neuropathy (HCC)  Worsening  Recommend to resume Trulicity, worked before, now has worse diabetes control  Hemoglobin A1c 6.6-6.3-6.7-8.2  Average blood glucose 195±37  Basal amount 71%  Bolus amount 29%  Change pump settings as follows:  Midnight 0.8 units/h, 6 AM 1.45 units/h 12 PM 1.45 units/h 4 PM 1.45 units/h 7 PM 1.60 units/h  - insulin aspart (NOVOLOG) 100 UNIT/ML injection vial; Inject up to 60 units subcutaneously daily via insulin pump.   Dispense: 2 vial; Refill: 3  - Comprehensive Metabolic Panel; Future  - Hemoglobin A1C; Future    2. Mixed hyperlipidemia  Worsening  LDL cholesterol 33-13-  Had muscle pain. Start Pravastatin 20 mg daily. Was having stomach pain before. - Lipid Panel; Future  Take fish oil  , 98, 106, 79. Diet. 3. Obesity   Diet. 4. Essential hypertension  Current meds. 5. Vitamin D deficiency  25 hydroxy vitamin D 62-47-40-51  Vitamin D 25 Hydroxy; Future    6. CKD (chronic kidney disease) stage 5  Nephrology follow up. On HD     7. MNG  TSH 2.0-1.0  I recommended FNA of the right 2.6 cm nodule, left 2.4 cm nodules, increased in size  Refused another biopsy, prefers monitoring. Understands risk of thyroid cancer and thyroid nodules  - Thyroid sonogram    8. Nephropathy  Follow with Nephrology. Reviewed and/or ordered clinical lab results Yes  Reviewed and/or ordered radiology tests No  Reviewed and/or ordered other diagnostic tests No  Discussed test results with performing physician No  Independently reviewed image, tracing, or specimen Yes Insulin pump tracing, 7 pages of data. Recommendations and assessment and plan  Made a decision to obtain old records Yes  Reviewed old records No  Obtained history from other than patient No    Comfort Tc was counseled regarding symptoms of diabetes diagnosis, course and complications of disease if inadequately treated, side effects of medications, diagnosis, treatment options, and prognosis, risks, benefits, complications, and alternatives of treatment, labs, imaging and other studies and treatment targets and goals, thyroid cancer risk, insulin pump data, settings, arrangements. She understands instructions and counseling. Total time I spent for this encounter 40 minutes    Return in about 3 months (around 12/28/2021) for diabetes.

## 2021-10-07 DIAGNOSIS — E11.21 CONTROLLED TYPE 2 DIABETES MELLITUS WITH DIABETIC NEPHROPATHY, WITH LONG-TERM CURRENT USE OF INSULIN (HCC): Primary | ICD-10-CM

## 2021-10-07 DIAGNOSIS — Z79.4 CONTROLLED TYPE 2 DIABETES MELLITUS WITH DIABETIC NEPHROPATHY, WITH LONG-TERM CURRENT USE OF INSULIN (HCC): Primary | ICD-10-CM

## 2021-10-19 LAB
ALBUMIN/GLOBULIN RATIO: 2 RATIO (ref 0.8–2.6)
ALBUMIN: 4.1 G/DL (ref 3.5–5.2)
ALP BLD-CCNC: 73 U/L (ref 23–144)
ALT SERPL-CCNC: 7 U/L (ref 0–60)
AST SERPL-CCNC: 9 U/L (ref 0–55)
BILIRUB SERPL-MCNC: 0.3 MG/DL (ref 0–1.2)
BUN BLDV-MCNC: 32 MG/DL (ref 3–29)
BUN BLDV-MCNC: 32 MG/DL (ref 3–29)
BUN/CREAT BLD: 6 (ref 7–25)
BUN/CREAT BLD: 6 (ref 7–25)
C-PEPTIDE: 4.67 NG/ML (ref 0.8–3.1)
CALCIUM SERPL-MCNC: 9.7 MG/DL (ref 8.5–10.5)
CALCIUM SERPL-MCNC: 9.8 MG/DL (ref 8.5–10.5)
CHLORIDE BLD-SCNC: 97 MEQ/L (ref 96–110)
CHLORIDE BLD-SCNC: 98 MEQ/L (ref 96–110)
CHOLESTEROL: 176 MG/DL
CO2: 25 MEQ/L (ref 19–32)
CO2: 25 MEQ/L (ref 19–32)
CREAT SERPL-MCNC: 5.3 MG/DL (ref 0.5–1.2)
CREAT SERPL-MCNC: 5.4 MG/DL (ref 0.5–1.2)
FASTING STATUS: ABNORMAL
GFR AFRICAN AMERICAN: 9 MLS/MIN/1.73M2
GFR AFRICAN AMERICAN: 9 MLS/MIN/1.73M2
GFR NON-AFRICAN AMERICAN: 8 MLS/MIN/1.73M2
GFR NON-AFRICAN AMERICAN: 8 MLS/MIN/1.73M2
GLOBULIN: 2.1 G/DL (ref 1.9–3.6)
GLUCOSE BLD-MCNC: 109 MG/DL (ref 70–99)
GLUCOSE BLD-MCNC: 112 MG/DL (ref 70–99)
HBA1C MFR BLD: 9.4 % (ref 4–6)
HDLC SERPL-MCNC: 37 MG/DL
LDL CHOLESTEROL CALCULATED: 105 MG/DL
POTASSIUM SERPL-SCNC: 4.5 MEQ/L (ref 3.4–5.3)
POTASSIUM SERPL-SCNC: 4.6 MEQ/L (ref 3.4–5.3)
SODIUM BLD-SCNC: 135 MEQ/L (ref 135–148)
SODIUM BLD-SCNC: 135 MEQ/L (ref 135–148)
STATUS: ABNORMAL
T4 FREE: 1.38 NG/DL (ref 0.8–1.8)
TOTAL PROTEIN: 6.2 G/DL (ref 6–8.3)
TRIGL SERPL-MCNC: 172 MG/DL
TSH SERPL DL<=0.05 MIU/L-ACNC: 1.06 MCIU/ML (ref 0.4–4.5)
VITAMIN D 25-HYDROXY: 50 NG/ML (ref 30–100)
VLDLC SERPL CALC-MCNC: 34 MG/DL (ref 4–38)

## 2022-01-13 ENCOUNTER — VIRTUAL VISIT (OUTPATIENT)
Dept: ENDOCRINOLOGY | Age: 67
End: 2022-01-13
Payer: COMMERCIAL

## 2022-01-13 DIAGNOSIS — I10 ESSENTIAL HYPERTENSION: ICD-10-CM

## 2022-01-13 DIAGNOSIS — E78.2 MIXED HYPERLIPIDEMIA: ICD-10-CM

## 2022-01-13 DIAGNOSIS — E66.9 OBESITY (BMI 35.0-39.9 WITHOUT COMORBIDITY): ICD-10-CM

## 2022-01-13 DIAGNOSIS — E11.40 TYPE 2 DIABETES, CONTROLLED, WITH NEUROPATHY (HCC): Primary | ICD-10-CM

## 2022-01-13 DIAGNOSIS — E11.21 CONTROLLED TYPE 2 DIABETES MELLITUS WITH DIABETIC NEPHROPATHY, WITH LONG-TERM CURRENT USE OF INSULIN (HCC): ICD-10-CM

## 2022-01-13 DIAGNOSIS — Z79.4 CONTROLLED TYPE 2 DIABETES MELLITUS WITH DIABETIC NEPHROPATHY, WITH LONG-TERM CURRENT USE OF INSULIN (HCC): ICD-10-CM

## 2022-01-13 DIAGNOSIS — E55.9 VITAMIN D DEFICIENCY: ICD-10-CM

## 2022-01-13 DIAGNOSIS — N18.5 CKD (CHRONIC KIDNEY DISEASE) STAGE 5, GFR LESS THAN 15 ML/MIN (HCC): ICD-10-CM

## 2022-01-13 DIAGNOSIS — E04.2 MULTINODULAR GOITER: ICD-10-CM

## 2022-01-13 PROCEDURE — G8427 DOCREV CUR MEDS BY ELIG CLIN: HCPCS | Performed by: INTERNAL MEDICINE

## 2022-01-13 PROCEDURE — 3017F COLORECTAL CA SCREEN DOC REV: CPT | Performed by: INTERNAL MEDICINE

## 2022-01-13 PROCEDURE — 1090F PRES/ABSN URINE INCON ASSESS: CPT | Performed by: INTERNAL MEDICINE

## 2022-01-13 PROCEDURE — 99215 OFFICE O/P EST HI 40 MIN: CPT | Performed by: INTERNAL MEDICINE

## 2022-01-13 PROCEDURE — 1123F ACP DISCUSS/DSCN MKR DOCD: CPT | Performed by: INTERNAL MEDICINE

## 2022-01-13 PROCEDURE — 2022F DILAT RTA XM EVC RTNOPTHY: CPT | Performed by: INTERNAL MEDICINE

## 2022-01-13 PROCEDURE — G8400 PT W/DXA NO RESULTS DOC: HCPCS | Performed by: INTERNAL MEDICINE

## 2022-01-13 PROCEDURE — 3044F HG A1C LEVEL LT 7.0%: CPT | Performed by: INTERNAL MEDICINE

## 2022-01-13 PROCEDURE — 4040F PNEUMOC VAC/ADMIN/RCVD: CPT | Performed by: INTERNAL MEDICINE

## 2022-01-13 RX ORDER — DIPHENOXYLATE HYDROCHLORIDE AND ATROPINE SULFATE 2.5; .025 MG/1; MG/1
1 TABLET ORAL 4 TIMES DAILY PRN
COMMUNITY

## 2022-01-13 RX ORDER — DULAGLUTIDE 0.75 MG/.5ML
0.75 INJECTION, SOLUTION SUBCUTANEOUS WEEKLY
Qty: 12 PEN | Refills: 1 | Status: SHIPPED | OUTPATIENT
Start: 2022-01-13 | End: 2022-07-12

## 2022-01-13 NOTE — PROGRESS NOTES
James Serna is a 77 y.o. female who presents for Type 2 diabetes mellitus. 2022    TELEHEALTH EVALUATION -- Audio/Visual ([facility])    Patient provided verbal consent to use the video visit. HPI:    James Serna (:  1955) has requested an audio/video evaluation for the following concern(s):    Current symptoms/problems include hyperglycemia and show no change. 1.  Type 2 diabetes, controlled, with neuropathy ([facility] [address] [E11.40]    Diagnosed with Type 2 diabetes mellitus in .     Comorbid conditions: hyperlipidemia, CKD and Neuropathy    Current diabetic medications include: Novolog    Intolerance to diabetes medications: No     Weight trend: stable  Prior visit with dietician: yes  Current diet: on average, 3 meals per day  Current exercise: active     Current monitoring regimen: home blood tests - 4 times daily for last 60 days  Has brought blood glucose log/meter:  Yes  Home blood sugar records: fasting range: 190 and postprandial range: 200  Any episodes of hypoglycemia? Rare  Hypoglycemia frequency and time(s):  twice a month  Does patient have Glucagon emergency kit? No  Does patient have rapid acting carbohydrate? Yes  Does patient wear a medic alert bracelet or necklace? No    2. Mixed hyperlipidemia  Has muscle pain. 3. Obesity   Eats healthy. 4. Essential hypertension  No headaches. 5. Vitamin D deficiency  No bone pain. 6. CKD (chronic kidney disease) stage 5  No urination problems. 7. MNG  No difficulty swallowing, voice change, history of radiation, thyroid cancer in family. 8. Nephropathy  Urinates 5-6 times a day    ULTRASOUND THYROID, 2020 12:06 PM.       INDICATION: Thyroid nodule. Goiter.       COMPARISON: Ultrasound thyroid, 10/5/2017.       TECHNIQUE: Multiple transverse and longitudinal gray scale images were obtained through the    thyroid gland.       FINDINGS:        Right lobe measures 2.2 x 2.5 x 5.3 cm. Left lobe measures 2.0 x 2.6 x 5.3 cm. Isthmus is 13 mm    in thickness. Thyroid gland is heterogeneous in echotexture throughout. There is    redemonstration of multiple nodules within the bilateral lobes, numbering at least 5.       Largest nodule is located inferiorly within the left lobe and is TI-RADS Category 4. This    measures 1.9 x 2.1 x 2.4 cm compared to 1.2 x 1.7 x 2.2 cm previously. This lesion is    heterogeneous, is wider than tall, and has regions of hypoechoic and hyperechoic components. No    evidence of cystic change or microcalcifications.       Largest nodule in the right lobe measures 1.6 x 2.3 x 2.6 cm. This nodule is TI-RADS Category    4, wider than tall, and mildly heterogeneous with a few foci of microcalcification. This nodule    previously measured 1.2 x 1.7 x 2.2 cm. No definite new nodule.           IMPRESSION:        BILATERAL TI-RADS CATEGORY 4 LESIONS SHOWING MILD INCREASE IN SIZE AS COMPARED TO PREVIOUS    STUDY 2017. CORRELATION WITH SHORT INTERVAL FOLLOW-UP OR FINE-NEEDLE ASPIRATION MAY BE HELPFUL    FOR FURTHER EVALUATION. PATIENT HAS PREVIOUSLY UNDERGONE BIOPSY OF MULTIPLE NODULES IN 2016.       DICTATED Jimmy Keller M.D. Workstation [id number]         CYTOLOGY REPORT  ______________________________________________________________________  Patient Name: Druscilla Councilman    Accession #:        Location: [id number]     Med. Rec. #:  [id number]  Account [de-identified]  Soc. Sec. #:         : 1955 (Age: 60)   Gender: F  Physician(s): Marquis Rivero M.D. Collected: 3/1/2016  Received:  3/1/2016  ______________________________________________________________________  Source of Specimen(s)  1 of 4.  Thyroid, Fine Needle , Right Upper    Clinical History  Pertinent Clinical History:   Other Clinical Conditions:    Clinical Diagnosis:  Thyroid Nodules    ______________________________________________________________________   FINAL CYTOLGIC DIAGNOSIS 1 of 4. Thyroid, Fine Needle , Right Upper:       Satisfactory for evaluation.         Benign. Colloid nodule.               ______________________________________________________________________  Cytotechnologist: DL    Electronically Yanelis Beard. Jacobo Beltran MD  Pathologist  3/2/2016      Procedures/Addenda  FNA Immediate Specimen Evaluation     Date Ordered:     3/1/2016      Status: Signed Out        Date Complete:     3/1/2016     By: Lauren Horner       Date Reported:     3/2/2016         Preliminary Diagnosis  - Thyroid epithelial cells and colloid sufficient for evaluation. Results-Comments             Sammy Aleman. Jacobo Beltran MD        Gross Description    Received in Cytolyt.  30ml red, cloudy fluid. 8 prepared slides 4 airdried, 4 fixed    ______________________________________________________________________                                   CPT Code(s): 1: D6077442, X035232, [id number])           CYTOLOGY REPORT  ______________________________________________________________________  Patient Name: Juan Garcia    Accession #:        Location: -[facility]     Med. Rec. #:  [id number]  Account [de-identified]  Soc. Sec. #:         : 1955 (Age: 60)   Gender: F  Physician(s): Torres Mata M.D. Collected: 3/1/2016  Received:  3/1/2016  ______________________________________________________________________  Source of Specimen(s)  2 of 4. Thyroid Fine Needle Aspiration, Right Lower    Clinical History  Pertinent Clinical History:   Other Clinical Conditions:    Clinical Diagnosis:  Thyroid nodules         ______________________________________________________________________   FINAL CYTOLGIC DIAGNOSIS    2 of 4. Thyroid Fine Needle Aspiration, Right Lower:       Satisfactory for evaluation.         Benign. Adenomatoid or colloid nodule. Chronic lymphocytic thyroiditis.                ______________________________________________________________________  Cytotechnologist: DL    Electronically Lexis Beck. Clayton Fam MD  Pathologist  3/2/2016      Procedures/Addenda  FNA Immediate Specimen Evaluation     Date Ordered:     3/1/2016      Status: Signed Out        Date Complete:     3/1/2016     By: Juan Funk       Date Reported:     3/2/2016         Preliminary Diagnosis  - Thyroid epithelial cells and colloid sufficient for evaluation. Results-Comments             Christopher Roberto. Clayton Fam MD        Gross Description  Received in Cytolyt.  30ml red, cloudy fluid. 8 prepared slides 4 airdried, 4 fixed    ______________________________________________________________________                                   CPT Code(s): 1: E2623429, W7428593, [id number])           CYTOLOGY REPORT  ______________________________________________________________________  Patient Name: Mick Rachel    Accession #:        Location: [facility] Rec. #:  [id number]  Account [de-identified]  Soc. Sec. #:         : 1955 (Age: 60)   Gender: F  Physician(s): Acacia Gregg M.D. Collected: 3/1/2016  Received:  3/1/2016  ______________________________________________________________________  Source of Specimen(s)  3 of 4. Thyroid, Fine Needle Aspiration, Isthmus    Clinical History  Pertinent Clinical History:   Other Clinical Conditions:    Clinical Diagnosis:  Thyroid nodules    ______________________________________________________________________   FINAL CYTOLGIC DIAGNOSIS    3 of 4. Thyroid, Fine Needle Aspiration, Isthmus:       Satisfactory for evaluation.         Benign. Colloid nodule.                 ______________________________________________________________________  Cytotechnologist: DL    Electronically Lexis Beck.  Clayton Fam MD  Pathologist  3/2/2016      Procedures/Addenda  FNA Immediate Specimen Evaluation     Date Ordered:     3/1/2016      Status: Signed Out        Date Complete:     3/1/2016     By: Juan Funk       Date Reported:     3/2/2016     Preliminary Diagnosis  - Thyroid epithelial cells and colloid sufficient for evaluation. Results-Comments             Sebas Phillips. Saira Orosco MD        Gross Description  Received in Cytolyt.  30ml red, cloudy fluid. 8 prepared slides 4 airdried, 4 fixed    ______________________________________________________________________                                   CPT Code(s): 1: I8257009, Y079482, [id number])           CYTOLOGY REPORT  ______________________________________________________________________  Patient Name: Ed Neil    Accession #:        Location: [facility]     Med. Rec. #:  [id number]  Account [de-identified]  Soc. Sec. #:         : 1955 (Age: 60)   Gender: F  Physician(s): Kayley Shahid M.D. Collected: 3/1/2016  Received:  3/1/2016  ______________________________________________________________________  Source of Specimen(s)   of 4. Thyroid, Fine Needle Aspiration, Left mid    Clinical History  Pertinent Clinical History:   Other Clinical Conditions:    Clinical Diagnosis:  Thyroid nodules    ______________________________________________________________________   FINAL CYTOLGIC DIAGNOSIS    4 of 4. Thyroid, Fine Needle Aspiration, Left mid:       Satisfactory for evaluation.         Benign. Colloid nodule. .               ______________________________________________________________________  Cytotechnologist: DL    Electronically Franklin Tang. Saira Orosco MD  Pathologist  3/2/2016      Procedures/Addenda  FNA Immediate Specimen Evaluation     Date Ordered:     3/1/2016      Status: Signed Out        Date Complete:     3/1/2016     By: Althea Whittaker       Date Reported:     3/2/2016         Preliminary Diagnosis  - Thyroid epithelial cells and colloid sufficient for evaluation. Results-Comments             Devere Hem. Saira Orosco MD        Gross Description  Received in Cytolyt.  30ml red, cloudy fluid.   8 prepared slides 4 airdried, 4 fixed    ______________________________________________________________________                                   CPT Code(s): 1: 61679, N710334, I8759762)           Lab Results   Component Value Date    LABA1C 6.7 (H) 01/09/2022     No results found for: EAG      IMPRESSION:    1.  Slight asymmetric enlargement of the right lobe of the thyroid gland as compared to the left lobe of the thyroid gland. There is a heterogeneous echotexture demonstrated in each lobe of the thyroid gland as well as along the isthmus. 2.  There are 3 nodules demonstrated in the right lobe of the thyroid gland which have not substantially changed in size compared to the previous ultrasound study. An additional nodule in the left lobe of the thyroid gland and along the isthmus have also not substantially changed in size. Workstation [id number]   Result Narrative   STUDY:  Ultrasound examination of the thyroid gland. CLINICAL:   Follow-up of thyroid gland nodules    COMPARISON:   2/1/2016    TECHNIQUE:   Ultrasound examination of the thyroid gland is performed with grayscale and with color Doppler. FINDINGS:     The right lobe of the thyroid gland measures 6.0 x 2.0 x 2.9 cm. On the previous ultrasound study the right lobe measured 5.3 x 2.2 x 2.8 cm. There is a diffusely heterogeneous echotexture in the right lobe of the thyroid gland. There is a well-defined hypoechoic nodule along the upper portion of the right lobe measuring 0.6 x 0.9 x 0.5 cm. There is a solid nodule in the midportion of the right lobe measuring 2.6 x 2.1 x 2.5 cm. This has slightly ill-defined margins. Inferiorly there is an additional solid nodule measuring 1.8 x 2.2 x 1.2 cm which may have a small amount of calcification along the wall. On the previous ultrasound study, the largest nodule in the midportion of the right lobe measured up to 2.8 cm and the nodule in the lower pole measured up to 2.4 cm indicating no substantial interval change in size.     The left lobe of the thyroid gland currently measures 4.9 x 2.3 x 2.3 cm. On the previous ultrasound study the left lobe measured 5.4 x 2.8 x 2.6 cm. There is a heterogeneous echotexture in the left lobe of the thyroid gland. There is a solid nodule in the mid to lower portion of the left lobe measuring 1.7 x 2.8 x 1.5 cm. On the previous study this measured up to 2.9 cm indicating no substantial interval change. The isthmus measures 6 mm in thickness. There is a nodule along the anterior aspect of the isthmus measuring 1.0 x 0.8 x 0.8 cm. On the previous ultrasound this measured up to 1 cm indicating no significant interval change.          Past Medical History:   Diagnosis Date    Anemia     Eye pressure     Hypertension     Kidney failure     Type 2 diabetes mellitus without complication (HCC)       Patient Active Problem List   Diagnosis    Type 2 diabetes, controlled, with neuropathy ([address]    Mixed hyperlipidemia    Obesity (BMI 35.0-39.9 without comorbidity)    Essential hypertension    Vitamin D deficiency    CKD (chronic kidney disease) stage 5, GFR less than 15 ml/min (HCC)    Multinodular goiter    Controlled type 2 diabetes mellitus with diabetic nephropathy (HCC)    DM type 2, uncontrolled, with neuropathy ([address]    Uncontrolled type 2 diabetes mellitus with diabetic nephropathy (HCC)     Past Surgical History:   Procedure Laterality Date    COLONOSCOPY       Social History     Socioeconomic History    Marital status:      Spouse name: Not on file    Number of children: Not on file    Years of education: Not on file    Highest education level: Not on file   Occupational History    Not on file   Tobacco Use    Smoking status: Never Smoker    Smokeless tobacco: Never Used   Vaping Use    Vaping Use: Never used   Substance and Sexual Activity    Alcohol use: No    Drug use: No    Sexual activity: Never   Other Topics Concern    Not on file   Social History Narrative    Not on file     Social Determinants of Health     Financial Resource Strain:     Difficulty of Paying Living Expenses: Not on file   Food Insecurity:     Worried About Running Out of Food in the Last Year: Not on file    [place] of Food in the Last Year: Not on file   Transportation Needs:     Lack of Transportation (Medical): Not on file    Lack of Transportation (Non-Medical): Not on file   Physical Activity:     Days of Exercise per Week: Not on file    Minutes of Exercise per Session: Not on file   Stress:     Feeling of Stress : Not on file   Social Connections:     Frequency of Communication with Friends and Family: Not on file    Frequency of Social Gatherings with Friends and Family: Not on file    Attends Sikhism Services: Not on file    Active Member of [address] [place] [address] [employer] or Organizations: Not on file    Attends Club or Organization Meetings: Not on file    Marital Status: Not on file   Intimate Partner Violence:     Fear of Current or Ex-Partner: Not on file    Emotionally Abused: Not on file    Physically Abused: Not on file    Sexually Abused: Not on file   Housing Stability:     Unable to Pay for Housing in the Last Year: Not on file    Number of [place] in the Last Year: Not on file    Unstable Housing in the Last Year: Not on file     Family History   Problem Relation Age of Onset    Diabetes Mother     Heart Attack Mother     Cancer Father     No Known Problems Daughter     No Known Problems Daughter      Current Outpatient Medications   Medication Sig Dispense Refill    Calcium Acetate, Phos Binder, (CALCIUM ACETATE PO) Take 675 mg by mouth      diphenoxylate-atropine (LOMOTIL) 2.5-0.025 MG per tablet Take 1 tablet by mouth 4 times daily as needed for Diarrhea.  Dulaglutide (TRULICITY) 2.38 LD/0.7EF SOPN Inject 0.75 mg into the skin once a week 12 pen 1    NOVOLOG 100 UNIT/ML injection vial TOTAL DAILY DOSE 60 UNITS  DAILY.  USE VIA INSULIN PUMP 20 mL 3    Ascorbic Acid (VITAMIN C) 250 MG tablet Take 250 mg by mouth daily      calcitRIOL (ROCALTROL) 0.25 MCG capsule Take 0.25 mcg by mouth daily      hydrALAZINE (APRESOLINE) 50 MG tablet Take 1 tablet by mouth 2 times daily      CONTOUR NEXT TEST strip 1 each by In Vitro route 4 times daily As needed. 375 strip 3    furosemide (LASIX) 80 MG tablet Take 160 mg by mouth 2 times daily       metoprolol tartrate (LOPRESSOR) 50 MG tablet Take 50 mg by mouth 2 times daily      amLODIPine (NORVASC) 5 MG tablet Take 5 mg by mouth daily      vitamin D (CHOLECALCIFEROL) 5000 units CAPS capsule Take 5,000 Units by mouth daily      pravastatin (PRAVACHOL) 20 MG tablet Take 1 tablet by mouth daily (Patient not taking: Reported on 2022) 30 tablet 3     No current facility-administered medications for this visit.      Allergies   Allergen Reactions    Fenofibrate Other (See Comments)     Muscle cramps in legs    Ace Inhibitors      Other reaction(s): Cough     Family Status   Relation Name Status    Mother      Father     Edward Laurent  Alive    Anastasiia  Alive       Review of Systems  Constitutional: no fatigue, no fever, no recent weight gain, no recent weight loss, no changes in appetite  Eyes: no eye pain, no change in vision, no eye redness, no eye irritation, no double vision  Ears, nose, throat: no nasal congestion, no sore throat, no earache, no decrease in hearing, no hoarseness, no dry mouth, no sinus problems, no difficulty swallowing, no neck lumps, no dental problems, no mouth sores, no ringing in ears  Pulmonary: no shortness of breath, no wheezing, no dyspnea on exertion, no cough  Cardiovascular: no chest pain, no lower extremity edema, no orthopnea, no intermittent leg claudication, no palpitations  Gastrointestinal: no abdominal pain, no nausea, no vomiting, no diarrhea, no constipation, no dysphagia, no heartburn, no bloating  Genitourinary: no dysuria, no urinary incontinence, no urinary hesitancy, no urinary frequency, no feelings of urinary urgency, no nocturia  Musculoskeletal: no joint swelling, no joint stiffness, has joint pain, no muscle cramps, no muscle pain, no bone pain  Integument/Breast: no hair loss, no skin rashes, no skin lesions, no itching, no dry skin  Neurological: no numbness, no tingling, no weakness, no confusion, no headaches, no dizziness, no fainting, no tremors, no decrease in memory, has balance problems  Psychiatric: no anxiety, no depression, no insomnia  Hematologic/Lymphatic: no tendency for easy bleeding, no swollen lymph nodes, no tendency for easy bruising  Immunology: no seasonal allergies, no frequent infections, no frequent illnesses  Endocrine: no temperature intolerance     OBJECTIVE:  There were no vitals taken for this visit.      Wt Readings from Last 3 Encounters:   09/28/21 202 lb (91.6 kg)   06/22/21 206 lb (93.4 kg)   02/27/20 201 lb 3.2 oz (91.3 kg)     OBJECTIVE:  Constitutional: no apparent distress, well developed and well nourished  Mental status: alert and awake, oriented to person, place and time, able to follow commands  Psychiatric: judgement and insight and normal, recent and remote memory are intact, mood and affect are normal  Skin: skin inspection appears normal, no significant exanthematous lesions or discoloration noted on facial skin  Head and Face: head and face inspection revealed no abnormalities, normocephalic, atraumatic  Eyes: no lid or conjunctival swelling, erythema or discharge, sclera appears normal  Ears/Nose: external inspection of ears and nose revealed no abnormalities, hearing is grossly normal  Oropharynx/Mouth/Face: lips are normal with no lesions, the voice quality was normal  Neck: neck is symmetric, no visualized mass  Pulmonary/chest: respiratory effort normal, no generalized signs of difficulty breathing or signs of respiratory distress  Musculoskeletal: normal station, normal range of motion of neck  Neurological: no facial asymmetry, normal general cortical function      ASSESSMENT/PLAN:  1. Type 2 diabetes mellitus, controlled, with neuropathy (HCC)  Worsening  Continue Trulicity  Hemoglobin U3A 6.6-6.3-6.7-8.2-6.7  Change pump settings as follows:  Midnight 0.8 units/h, 6 AM 1.40 units/h 12 PM 1.45 units/h 4 PM 1.45 units/h 7 PM 1.60 units/h  - insulin aspart (NOVOLOG) 100 UNIT/ML injection vial; Inject up to 60 units subcutaneously daily via insulin pump. Dispense: 2 vial; Refill: 3  - Comprehensive Metabolic Panel; Future  - Hemoglobin A1C; Future    2. Mixed hyperlipidemia  LDL cholesterol 69-27--99  Had muscle pain. Start Pravastatin 20 mg daily. Was having stomach pain before. - Lipid Panel; Future  Take fish oil  , 98, 106, 79. Diet. 3. Obesity   Diet. 4. Essential hypertension  Current meds. 5. Vitamin D deficiency  25 hydroxy vitamin D 70-55-61-51-48  Vitamin D 25 Hydroxy; Future    6. CKD (chronic kidney disease) stage 5  Nephrology follow up. On HD     7. MNG  TSH 2.0-1.0-0.7  7/6/2021  I recommended FNA of the right 1.9 cm, 2.5cm, 2.6 cm nodules  Refused another biopsy, prefers monitoring. Understands risk of thyroid cancer and thyroid nodules  - Thyroid sonogram    8. Nephropathy  Follow with Nephrology.     Reviewed and/or ordered clinical lab results Yes  Reviewed and/or ordered radiology tests No  Reviewed and/or ordered other diagnostic tests No  Discussed test results with performing physician No  Independently reviewed image, tracing, or specimen No  Recommendations and assessment and plan  Made a decision to obtain old records Yes  Reviewed old records No  Obtained history from other than patient No    Comfort Montez was counseled regarding symptoms of diabetes diagnosis, course and complications of disease if inadequately treated, side effects of medications, diagnosis, treatment options, and prognosis, risks, benefits, complications, and alternatives of treatment, labs, imaging and other studies and treatment targets and goals, thyroid cancer risk, insulin pump data, settings, arrangements. She understands instructions and counseling. Pablo Gonzalez is a 77 y.o. female being evaluated by a Virtual Visit (video visit) encounter, including two-way audio and video communication, in lieu of an in-person visit due to coronavirus emergency, to address concerns as mentioned in history and assessment and plan. Patient identification was verified at the start of the visit. I conducted an interview, performed a limited exam by video and educated the patient on my assessment and plan. Due to this being a TeleHealth encounter (During [facility]-69 public health emergency), evaluation of the following organ systems was limited: Vitals/Constitutional/EENT/Resp/CV/GI//MS/Neuro/Skin/Heme-Lymph-Imm. Pursuant to the emergency declaration under the [address] [place] [address] authority and the [employer] and [employer] Act, this Virtual Visit was conducted with patient's (and/or legal guardian's) consent, to reduce the patient's risk of exposure to COVID-19 and provide necessary medical care. The patient (and/or legal guardian) has also been advised to contact this office for worsening conditions or problems, and seek emergency medical treatment and/or call 911 if deemed necessary. Total time spent on this encounter via Telehealth (synchronous, real-time audio/visual connection): 40 min    See assessment, plan and counseling note for counseling and care coordination details. Services were provided through a video synchronous discussion virtually to substitute for in-person clinic visit. Persons participating in the telehealth service: provider - Ifeanyi Kearney MD and patient Pablo Gonzalez. Provider was located at her office. Patient was located at home.         --Ifeanyi Kearney MD on 1/13/2022 at 3:23 PM    An electronic signature was used to authenticate this note. Return in about 3 months (around 4/13/2022) for diabetes.

## 2022-01-19 DIAGNOSIS — E11.40 TYPE 2 DIABETES, CONTROLLED, WITH NEUROPATHY (HCC): ICD-10-CM

## 2022-01-19 NOTE — TELEPHONE ENCOUNTER
E-scribed to [facility]. Have added it to pt preference in pharmacy. Attempted to contact pt, VM not set up, just rang as busy.

## 2022-01-19 NOTE — TELEPHONE ENCOUNTER
She has new insurance, [employer]. She now uses [id number] [phone]. [facility] staff to order novalog.

## 2022-03-31 ENCOUNTER — TELEPHONE (OUTPATIENT)
Dept: ENDOCRINOLOGY | Age: 67
End: 2022-03-31

## 2022-04-01 ENCOUNTER — TELEPHONE (OUTPATIENT)
Dept: ENDOCRINOLOGY | Age: 67
End: 2022-04-01

## 2022-04-01 NOTE — TELEPHONE ENCOUNTER
Patient states that  measurements where needed for her supplies to be ordered. She stated the company is [employer] [facility] and that her infusion set measures 23 inches and her reservoir measures 3.0 ml.

## 2022-04-04 NOTE — TELEPHONE ENCOUNTER
Patient states that  measurements where needed for her supplies to be ordered. She stated the company is [employer] [facility] and that her infusion set measures 23 inches and her reservoir measures 3.0 ml.

## 2022-04-05 RX ORDER — INSULIN PUMP SYRINGE, 3 ML
EACH MISCELLANEOUS
Qty: 9 EACH | Refills: 1 | Status: SHIPPED | OUTPATIENT
Start: 2022-04-05

## 2022-04-05 RX ORDER — INFUSION SET FOR INSULIN PUMP
INFUSION SETS-PARAPHERNALIA MISCELLANEOUS
Qty: 25 EACH | Refills: 1 | Status: SHIPPED | OUTPATIENT
Start: 2022-04-05

## 2022-04-05 RX ORDER — PERPHENAZINE 16 MG/1
1 TABLET, FILM COATED ORAL 4 TIMES DAILY
Qty: 375 STRIP | Refills: 3 | Status: SHIPPED | OUTPATIENT
Start: 2022-04-05

## 2022-05-19 ENCOUNTER — TELEPHONE (OUTPATIENT)
Dept: ENDOCRINOLOGY | Age: 67
End: 2022-05-19

## 2022-05-19 NOTE — TELEPHONE ENCOUNTER
Spoke w/ pt. Pt needs to contact insurance to see what DME is able to be used w/ [facility]. Pt to call us back.

## 2022-06-04 LAB
ALBUMIN/GLOBULIN RATIO: 1.7 RATIO (ref 0.8–2.6)
ALBUMIN: 3.9 G/DL (ref 3.5–5.2)
ALP BLD-CCNC: 102 U/L (ref 23–144)
ALT SERPL-CCNC: 9 U/L (ref 0–60)
AST SERPL-CCNC: 11 U/L (ref 0–55)
BILIRUB SERPL-MCNC: 0.1 MG/DL (ref 0–1.2)
BUN BLDV-MCNC: 14 MG/DL (ref 3–29)
BUN/CREAT BLD: 3 (ref 7–25)
CALCIUM SERPL-MCNC: 9.5 MG/DL (ref 8.5–10.5)
CHLORIDE BLD-SCNC: 99 MEQ/L (ref 96–110)
CHOLESTEROL: 193 MG/DL
CO2: 26 MEQ/L (ref 19–32)
CREAT SERPL-MCNC: 4.4 MG/DL (ref 0.5–1.2)
GLOBULIN: 2.3 G/DL (ref 1.9–3.6)
GLOMERULAR FILTRATION RATE: 10 MLS/MIN/1.73M2
GLUCOSE BLD-MCNC: 68 MG/DL (ref 70–99)
HBA1C MFR BLD: 6 % (ref 4–6)
HDLC SERPL-MCNC: 25 MG/DL
LDL CHOLESTEROL CALCULATED: ABNORMAL MG/DL
POTASSIUM SERPL-SCNC: 5.3 MEQ/L (ref 3.4–5.3)
SODIUM BLD-SCNC: 133 MEQ/L (ref 135–148)
STATUS: ABNORMAL
T3 FREE: 2.6 PG/ML (ref 2.3–4.2)
T4 FREE: 1.13 NG/DL (ref 0.8–1.8)
TOTAL PROTEIN: 6.2 G/DL (ref 6–8.3)
TRIGL SERPL-MCNC: 513 MG/DL
TSH SERPL DL<=0.05 MIU/L-ACNC: 1.23 MCIU/ML (ref 0.4–4.5)
VITAMIN D 25-HYDROXY: 48 NG/ML (ref 30–100)
VLDLC SERPL CALC-MCNC: ABNORMAL MG/DL (ref 4–38)

## 2022-06-07 ENCOUNTER — OFFICE VISIT (OUTPATIENT)
Dept: ENDOCRINOLOGY | Age: 67
End: 2022-06-07
Payer: COMMERCIAL

## 2022-06-07 VITALS
OXYGEN SATURATION: 98 % | WEIGHT: 192 LBS | TEMPERATURE: 98 F | HEART RATE: 76 BPM | RESPIRATION RATE: 14 BRPM | DIASTOLIC BLOOD PRESSURE: 68 MMHG | BODY MASS INDEX: 35.33 KG/M2 | HEIGHT: 62 IN | SYSTOLIC BLOOD PRESSURE: 132 MMHG

## 2022-06-07 DIAGNOSIS — E04.2 MULTINODULAR GOITER: ICD-10-CM

## 2022-06-07 DIAGNOSIS — Z79.4 CONTROLLED TYPE 2 DIABETES MELLITUS WITH DIABETIC NEPHROPATHY, WITH LONG-TERM CURRENT USE OF INSULIN (HCC): ICD-10-CM

## 2022-06-07 DIAGNOSIS — I10 ESSENTIAL HYPERTENSION: ICD-10-CM

## 2022-06-07 DIAGNOSIS — E11.21 CONTROLLED TYPE 2 DIABETES MELLITUS WITH DIABETIC NEPHROPATHY, WITH LONG-TERM CURRENT USE OF INSULIN (HCC): ICD-10-CM

## 2022-06-07 DIAGNOSIS — E66.01 CLASS 2 SEVERE OBESITY WITH SERIOUS COMORBIDITY AND BODY MASS INDEX (BMI) OF 36.0 TO 36.9 IN ADULT, UNSPECIFIED OBESITY TYPE (HCC): ICD-10-CM

## 2022-06-07 DIAGNOSIS — N18.5 CKD (CHRONIC KIDNEY DISEASE) STAGE 5, GFR LESS THAN 15 ML/MIN (HCC): ICD-10-CM

## 2022-06-07 DIAGNOSIS — E11.40 TYPE 2 DIABETES, CONTROLLED, WITH NEUROPATHY (HCC): Primary | ICD-10-CM

## 2022-06-07 DIAGNOSIS — E78.2 MIXED HYPERLIPIDEMIA: ICD-10-CM

## 2022-06-07 DIAGNOSIS — E55.9 VITAMIN D DEFICIENCY: ICD-10-CM

## 2022-06-07 PROBLEM — E66.812 CLASS 2 OBESITY WITH BODY MASS INDEX (BMI) OF 36.0 TO 36.9 IN ADULT: Status: ACTIVE | Noted: 2022-06-07

## 2022-06-07 PROBLEM — E66.9 CLASS 2 OBESITY WITH BODY MASS INDEX (BMI) OF 36.0 TO 36.9 IN ADULT: Status: ACTIVE | Noted: 2022-06-07

## 2022-06-07 PROCEDURE — G8427 DOCREV CUR MEDS BY ELIG CLIN: HCPCS | Performed by: INTERNAL MEDICINE

## 2022-06-07 PROCEDURE — 3017F COLORECTAL CA SCREEN DOC REV: CPT | Performed by: INTERNAL MEDICINE

## 2022-06-07 PROCEDURE — G8417 CALC BMI ABV UP PARAM F/U: HCPCS | Performed by: INTERNAL MEDICINE

## 2022-06-07 PROCEDURE — 2022F DILAT RTA XM EVC RTNOPTHY: CPT | Performed by: INTERNAL MEDICINE

## 2022-06-07 PROCEDURE — 1090F PRES/ABSN URINE INCON ASSESS: CPT | Performed by: INTERNAL MEDICINE

## 2022-06-07 PROCEDURE — 1123F ACP DISCUSS/DSCN MKR DOCD: CPT | Performed by: INTERNAL MEDICINE

## 2022-06-07 PROCEDURE — G8400 PT W/DXA NO RESULTS DOC: HCPCS | Performed by: INTERNAL MEDICINE

## 2022-06-07 PROCEDURE — 3044F HG A1C LEVEL LT 7.0%: CPT | Performed by: INTERNAL MEDICINE

## 2022-06-07 PROCEDURE — 99215 OFFICE O/P EST HI 40 MIN: CPT | Performed by: INTERNAL MEDICINE

## 2022-06-07 PROCEDURE — 1036F TOBACCO NON-USER: CPT | Performed by: INTERNAL MEDICINE

## 2022-06-07 RX ORDER — FENOFIBRATE 145 MG/1
145 TABLET, COATED ORAL DAILY
Qty: 90 TABLET | Refills: 0 | Status: SHIPPED | OUTPATIENT
Start: 2022-06-07 | End: 2022-09-02

## 2022-06-07 RX ORDER — FENOFIBRATE 145 MG/1
145 TABLET, COATED ORAL DAILY
Qty: 30 TABLET | Refills: 3 | Status: SHIPPED | OUTPATIENT
Start: 2022-06-07 | End: 2022-06-07

## 2022-06-07 NOTE — PROGRESS NOTES
Donna Peña is a 77 y.o. female who presents for Type 2 diabetes mellitus. Current symptoms/problems include hyperglycemia and show no change. 1.  Type 2 diabetes, controlled, with neuropathy ([facility] [address] [E11.40]    Diagnosed with Type 2 diabetes mellitus in 1968.     Comorbid conditions: hyperlipidemia, CKD and Neuropathy     Current diabetic medications include: Novolog     Intolerance to diabetes medications: No     Weight trend: stable  Prior visit with dietician: yes  Current diet: on average, 3 meals per day  Current exercise: active     Current monitoring regimen: home blood tests - 4 times daily for last 60 days  Has brought blood glucose log/meter:  Yes  Home blood sugar records: fasting range: 190 and postprandial range: 200  Any episodes of hypoglycemia? Rare  Hypoglycemia frequency and time(s):  twice a month  Does patient have Glucagon emergency kit? No  Does patient have rapid acting carbohydrate?  Yes  Does patient wear a medic alert bracelet or necklace?  No     2. Mixed hyperlipidemia  Has muscle pain.     3. Obesity   Eats healthy.     4. Essential hypertension  No headaches.     5. Vitamin D deficiency  No bone pain.     6. CKD (chronic kidney disease) stage 5  No urination problems.     7. MNG  No difficulty swallowing, voice change, history of radiation, thyroid cancer in family.     8. Nephropathy  Urinates 5-6 times a day  ULTRASOUND THYROID, 2/21/2020 12:06 PM.       INDICATION: Thyroid nodule. Goiter.       COMPARISON: Ultrasound thyroid, 10/5/2017.       TECHNIQUE: Multiple transverse and longitudinal gray scale images were obtained through the    thyroid gland.       FINDINGS:        Right lobe measures 2.2 x 2.5 x 5.3 cm. Left lobe measures 2.0 x 2.6 x 5.3 cm. Isthmus is 13 mm    in thickness. Thyroid gland is heterogeneous in echotexture throughout.  There is    redemonstration of multiple nodules within the bilateral lobes, numbering at least 5.       Largest nodule is located inferiorly within the left lobe and is TI-RADS Category 4. This    measures 1.9 x 2.1 x 2.4 cm compared to 1.2 x 1.7 x 2.2 cm previously. This lesion is    heterogeneous, is wider than tall, and has regions of hypoechoic and hyperechoic components. No    evidence of cystic change or microcalcifications.       Largest nodule in the right lobe measures 1.6 x 2.3 x 2.6 cm. This nodule is TI-RADS Category    4, wider than tall, and mildly heterogeneous with a few foci of microcalcification. This nodule    previously measured 1.2 x 1.7 x 2.2 cm. No definite new nodule.           IMPRESSION:        BILATERAL TI-RADS CATEGORY 4 LESIONS SHOWING MILD INCREASE IN SIZE AS COMPARED TO PREVIOUS    STUDY 2017. CORRELATION WITH SHORT INTERVAL FOLLOW-UP OR FINE-NEEDLE ASPIRATION MAY BE HELPFUL    FOR FURTHER EVALUATION. PATIENT HAS PREVIOUSLY UNDERGONE BIOPSY OF MULTIPLE NODULES IN .       DICTATED Griselda Bourdon, M.D. Workstation [id number]         CYTOLOGY REPORT  ______________________________________________________________________  Patient Name: Olena Ricks    Accession #:        Location: [facility]     [facility] Rec. #:  [id number]  Account [de-identified]  Soc. Sec. #:         : 1955 (Age: 60)   Gender: F  Physician(s): Valencia Rico M.D. Collected: 3/1/2016  Received:  3/1/2016  ______________________________________________________________________  Source of Specimen(s)  1 of 4. Thyroid, Fine Needle , Right Upper    Clinical History  Pertinent Clinical History:   Other Clinical Conditions:    Clinical Diagnosis:  Thyroid Nodules    ______________________________________________________________________   FINAL CYTOLGIC DIAGNOSIS    1 of 4. Thyroid, Fine Needle , Right Upper:       Satisfactory for evaluation.         Benign. Colloid nodule.               ______________________________________________________________________  Cytotechnologist: DL    Electronically Uma Barron. Tay Huntley MD  Pathologist  3/2/2016      Procedures/Addenda  FNA Immediate Specimen Evaluation     Date Ordered:     3/1/2016      Status: Signed Out        Date Complete:     3/1/2016     By: Doris Gordon       Date Reported:     3/2/2016         Preliminary Diagnosis  - Thyroid epithelial cells and colloid sufficient for evaluation. Results-Comments             Mar [place]. Tay Huntley MD        Gross Description    Received in Cytolyt.  30ml red, cloudy fluid. 8 prepared slides 4 airdried, 4 fixed    ______________________________________________________________________                                   CPT Code(s): 1: V8697253, Q1571120, Y0494905)           CYTOLOGY REPORT  ______________________________________________________________________  Patient Name: Hayden Machado    Accession #:        Location: [facility] Rec. #:  [id number]  Account [de-identified]  Soc. Sec. #:         : 1955 (Age: 60)   Gender: F  Physician(s): Governor Madison M.D. Collected: 3/1/2016  Received:  3/1/2016  ______________________________________________________________________  Source of Specimen(s)  2 of 4. Thyroid Fine Needle Aspiration, Right Lower    Clinical History  Pertinent Clinical History:   Other Clinical Conditions:    Clinical Diagnosis:  Thyroid nodules         ______________________________________________________________________   FINAL CYTOLGIC DIAGNOSIS    2 of 4. Thyroid Fine Needle Aspiration, Right Lower:       Satisfactory for evaluation.         Benign. Adenomatoid or colloid nodule. Chronic lymphocytic thyroiditis.              ______________________________________________________________________  Cytotechnologist: SHRUTI    Electronically Hernesto Gooden.  Tay Huntley MD  Pathologist  3/2/2016      Procedures/Addenda  FNA Immediate Specimen Evaluation     Date Ordered:     3/1/2016      Status: Signed Out        Date Complete:     3/1/2016     By: Doris Escalante Reported:     3/2/2016         Preliminary Diagnosis  - Thyroid epithelial cells and colloid sufficient for evaluation. Results-Comments             Lavell Oneal. Al Lopez MD        Gross Description  Received in Cytolyt.  30ml red, cloudy fluid. 8 prepared slides 4 airdried, 4 fixed    ______________________________________________________________________                                   CPT Code(s): 1: O701584, U437639, A7320505)           CYTOLOGY REPORT  ______________________________________________________________________  Patient Name: Tabitha Roy    Accession #:        Location: [facility]     Med. Rec. #:  [id number]  Account [de-identified]  Soc. Sec. #:         : 1955 (Age: 60)   Gender: F  Physician(s): Theo Saldivar M.D. Collected: 3/1/2016  Received:  3/1/2016  ______________________________________________________________________  Source of Specimen(s)  3  4. Thyroid, Fine Needle Aspiration, Isthmus    Clinical History  Pertinent Clinical History:   Other Clinical Conditions:    Clinical Diagnosis:  Thyroid nodules    ______________________________________________________________________   FINAL CYTOLGIC DIAGNOSIS    3 of 4. Thyroid, Fine Needle Aspiration, Isthmus:       Satisfactory for evaluation.         Benign. Colloid nodule.                 ______________________________________________________________________  Cytotechnologist: DL    Electronically Oly Chen. Al Lopez MD  Pathologist  3/2/2016      Procedures/Addenda  FNA Immediate Specimen Evaluation     Date Ordered:     3/1/2016      Status: Signed Out        Date Complete:     3/1/2016     By: Shaila Hernandez       Date Reported:     3/2/2016         Preliminary Diagnosis  - Thyroid epithelial cells and colloid sufficient for evaluation. Results-Comments             [place] One[place]. Al Lopez MD        Gross Description  Received in Cytolyt.  30ml red, cloudy fluid.   8 prepared slides 4 airdried, 4 fixed    ______________________________________________________________________                                   CPT Code(s): 1: 44683, T8790106, P8308904)           CYTOLOGY REPORT  ______________________________________________________________________  Patient Name: Enrique     Accession #:        Location: [facility]     [facility] Rec. #:  [id number]  Account [de-identified]  Soc. Sec. #:         : 1955 (Age: 60)   Gender: F  Physician(s): Martin Machado M.D. Collected: 3/1/2016  Received:  3/1/2016  ______________________________________________________________________  Source of Specimen(s)  . Thyroid, Fine Needle Aspiration, Left mid    Clinical History  Pertinent Clinical History:   Other Clinical Conditions:    Clinical Diagnosis:  Thyroid nodules    ______________________________________________________________________   FINAL CYTOLGIC DIAGNOSIS    4  4. Thyroid, Fine Needle Aspiration, Left mid:       Satisfactory for evaluation.         Benign. Colloid nodule. .               ______________________________________________________________________  Cytotechnologist: DL    Electronically Valentina Sevilla. Corazon Tao MD  Pathologist  3/2/2016      Procedures/Addenda  FNA Immediate Specimen Evaluation     Date Ordered:     3/1/2016      Status: Signed Out        Date Complete:     3/1/2016     By: Mor Potts       Date Reported:     3/2/2016         Preliminary Diagnosis  - Thyroid epithelial cells and colloid sufficient for evaluation. Results-Comments             Heidi Naya. Corazon Tao MD        Gross Description  Received in Cytolyt.  30ml red, cloudy fluid.   8 prepared slides 4 airdried, 4 fixed    ______________________________________________________________________                                   CPT Code(s): 1: Y5804706, [id number], V0811001)           Lab Results   Component Value Date    LABA1C 6.0 2022     No results found for: EAG      IMPRESSION:    1.  Slight asymmetric enlargement of the right lobe of the thyroid gland as compared to the left lobe of the thyroid gland. There is a heterogeneous echotexture demonstrated in each lobe of the thyroid gland as well as along the isthmus. 2.  There are 3 nodules demonstrated in the right lobe of the thyroid gland which have not substantially changed in size compared to the previous ultrasound study. An additional nodule in the left lobe of the thyroid gland and along the isthmus have also not substantially changed in size. Workstation [id number]   Result Narrative   STUDY:  Ultrasound examination of the thyroid gland. CLINICAL:   Follow-up of thyroid gland nodules    COMPARISON:   2/1/2016    TECHNIQUE:   Ultrasound examination of the thyroid gland is performed with grayscale and with color Doppler. FINDINGS:     The right lobe of the thyroid gland measures 6.0 x 2.0 x 2.9 cm. On the previous ultrasound study the right lobe measured 5.3 x 2.2 x 2.8 cm. There is a diffusely heterogeneous echotexture in the right lobe of the thyroid gland. There is a well-defined hypoechoic nodule along the upper portion of the right lobe measuring 0.6 x 0.9 x 0.5 cm. There is a solid nodule in the midportion of the right lobe measuring 2.6 x 2.1 x 2.5 cm. This has slightly ill-defined margins. Inferiorly there is an additional solid nodule measuring 1.8 x 2.2 x 1.2 cm which may have a small amount of calcification along the wall. On the previous ultrasound study, the largest nodule in the midportion of the right lobe measured up to 2.8 cm and the nodule in the lower pole measured up to 2.4 cm indicating no substantial interval change in size. The left lobe of the thyroid gland currently measures 4.9 x 2.3 x 2.3 cm. On the previous ultrasound study the left lobe measured 5.4 x 2.8 x 2.6 cm. There is a heterogeneous echotexture in the left lobe of the thyroid gland.  There is a solid nodule in the mid to lower portion of the left lobe measuring 1.7 x 2.8 x 1.5 cm. On the previous study this measured up to 2.9 cm indicating no substantial interval change. The isthmus measures 6 mm in thickness. There is a nodule along the anterior aspect of the isthmus measuring 1.0 x 0.8 x 0.8 cm. On the previous ultrasound this measured up to 1 cm indicating no significant interval change.          Past Medical History:   Diagnosis Date    Anemia     Eye pressure     Hypertension     Kidney failure     Type 2 diabetes mellitus without complication (HCC)       Patient Active Problem List   Diagnosis    Type 2 diabetes, controlled, with neuropathy ([address]    Mixed hyperlipidemia    Obesity (BMI 35.0-39.9 without comorbidity)    Essential hypertension    Vitamin D deficiency    CKD (chronic kidney disease) stage 5, GFR less than 15 ml/min (HCC)    Multinodular goiter    Controlled type 2 diabetes mellitus with diabetic nephropathy (HCC)    DM type 2, uncontrolled, with neuropathy ([address]    Uncontrolled type 2 diabetes mellitus with diabetic nephropathy (HCC)    Class 2 obesity with body mass index (BMI) of 36.0 to 36.9 in adult     Past Surgical History:   Procedure Laterality Date    COLONOSCOPY       Social History     Socioeconomic History    Marital status:      Spouse name: Not on file    Number of children: Not on file    Years of education: Not on file    Highest education level: Not on file   Occupational History    Not on file   Tobacco Use    Smoking status: Never Smoker    Smokeless tobacco: Never Used   Vaping Use    Vaping Use: Never used   Substance and Sexual Activity    Alcohol use: No    Drug use: No    Sexual activity: Never   Other Topics Concern    Not on file   Social History Narrative    Not on file     Social Determinants of Health     Financial Resource Strain:     Difficulty of Paying Living Expenses: Not on file   Food Insecurity:     Worried About Running Out of Food in the Last Year: Not on file   Edward Ran Out of Food in the Last Year: Not on file   Transportation Needs:     Lack of Transportation (Medical): Not on file    Lack of Transportation (Non-Medical): Not on file   Physical Activity:     Days of Exercise per Week: Not on file    Minutes of Exercise per Session: Not on file   Stress:     Feeling of Stress : Not on file   Social Connections:     Frequency of Communication with Friends and Family: Not on file    Frequency of Social Gatherings with Friends and Family: Not on file    Attends Congregation Services: Not on file    Active Member of [address] [place] [address] [employer] or Organizations: Not on file    Attends Club or Organization Meetings: Not on file    Marital Status: Not on file   Intimate Partner Violence:     Fear of Current or Ex-Partner: Not on file    Emotionally Abused: Not on file    Physically Abused: Not on file    Sexually Abused: Not on file   Housing Stability:     Unable to Pay for Housing in the Last Year: Not on file    Number of [place] in the Last Year: Not on file    Unstable Housing in the Last Year: Not on file     Family History   Problem Relation Age of Onset    Diabetes Mother     Heart Attack Mother     Cancer Father     No Known Problems Daughter     No Known Problems Daughter      Current Outpatient Medications   Medication Sig Dispense Refill    fenofibrate (TRICOR) 145 MG tablet Take 1 tablet by mouth daily 30 tablet 3    CONTOUR NEXT TEST strip 1 each by In Vitro route 4 times daily As needed. 375 strip 3    Insulin Infusion Pump Supplies (MINIMED PUMP RESERVOIR 3ML) MISC Change reservoir q 3 days 9 each 1    IV Sets-Tubing (INFUSION SET 23\") MISC Change infusion set q 3 days (please dispense minimed 630G compatible 23' set) 25 each 1    insulin aspart (NOVOLOG) 100 UNIT/ML injection vial TOTAL DAILY DOSE 60 UNITS  DAILY.  USE VIA INSULIN PUMP 30 mL 2    Calcium Acetate, Phos Binder, (CALCIUM ACETATE PO) Take 675 mg by mouth      diphenoxylate-atropine (LOMOTIL) pedal pulses and 2+ bilaterally, no edema  Abdomen: abdomen is soft, non-tender with no masses  Musculoskeletal: normal gait and station and exam of the digits and nails are normal  Neurological: normal coordination and normal general cortical function    Visual inspection:  Deformity/amputation: present hammer toes  Skin lesions/pre-ulcerative calluses: absent  Edema: right- 1+, left- 1+      Sensory exam:  Monofilament sensation: normal  (minimum of 5 random plantar locations tested, avoiding callused areas - > 1 area with absence of sensation is + for neuropathy)     Plus at least one of the following:  Pulses: normal  Proprioception: Intact  Vibration (128 Hz): Impaired    ASSESSMENT/PLAN:    1. Type 2 diabetes mellitus, controlled, with neuropathy ([facility] [address]  Recommend to continue insulin pump for diabetes management  Checks BG 4 times daily for 60 days  Average BG 81, SD 24  Hemoglobin A1c 6.6-6.3-6.7-8.2-6.7-6.0  Pump settings as follows:  Midnight 0.8 units/h, 6 AM 1.40 units/h 12 PM 1.45 units/h 4 PM 1.45 units/h 7 PM 1.60 units/h  - insulin aspart (NOVOLOG) 100 UNIT/ML injection vial; Inject up to 60 units subcutaneously daily via insulin pump. Dispense: 2 vial; Refill: 3  Continue Trulicity  - Comprehensive Metabolic Panel; Future  - Hemoglobin A1C; Future     2. Mixed hyperlipidemia  Start Fenofibrate for very high TG  Counseled about pancreatitis risk  LDL cholesterol 54-73--99  Triglycerides 513  Had muscle pain. Stopped Pravastatin 20 mg daily. Was having stomach pain. - Lipid Panel; Future     3. Obesity   Diet.     4. Essential hypertension  Current meds.     5. Vitamin D deficiency  25 hydroxy vitamin D 08-30-39-51-48  Vitamin D 25 Hydroxy; Future     6. CKD (chronic kidney disease) stage 5  Creatinine 4.4   GFR 10  Nephrology follow up. On HD      7.   MNG  Call for results  TSH 2.0-1.0-0.7-1.23  7/6/2021  I recommended FNA of the right 1.9 cm, 2.5cm, 2.6 cm nodules  Refused another biopsy, prefers monitoring. Will do another thyroid US, then decide on biopsy  Understands risk of thyroid cancer and thyroid nodules  - Thyroid sonogram     8. Nephropathy  Follow with Nephrology    Reviewed and/or ordered clinical lab results Yes  Reviewed and/or ordered radiology tests No  Reviewed and/or ordered other diagnostic tests No  Discussed test results with performing physician No  Independently reviewed image, tracing, or specimen Yes Insulin pump tracing, 7 pages of data. Recommendations and assessment and plan  Made a decision to obtain old records Yes  Reviewed old records No  Obtained history from other than patient No    Comfort Tc was counseled regarding symptoms of diabetes diagnosis, course and complications of disease if inadequately treated, side effects of medications, diagnosis, treatment options, and prognosis, risks, benefits, complications, and alternatives of treatment, labs, imaging and other studies and treatment targets and goals, thyroid cancer risk, insulin pump data, settings, arrangements. She understands instructions and counseling. Total time I spent for this encounter 40 minutes    Return in about 3 months (around 9/7/2022) for diabetes.

## 2022-06-26 ENCOUNTER — TELEPHONE (OUTPATIENT)
Dept: ENDOCRINOLOGY | Age: 67
End: 2022-06-26

## 2022-06-26 DIAGNOSIS — E04.2 MULTINODULAR GOITER: Primary | ICD-10-CM

## 2022-06-26 NOTE — TELEPHONE ENCOUNTER
Please inform patient that thyroid ultrasound showed large nodules on both sides, right 3.8 cm and left 2.9 cm nodules. I recommend biopsy. I ordered biopsy. Please ask patient where to fax the orders.

## 2022-06-27 NOTE — TELEPHONE ENCOUNTER
Called and informed pt, pt expressed understanding. Pt wants to have done at [facility], wants it mailed.

## 2022-07-12 RX ORDER — DULAGLUTIDE 0.75 MG/.5ML
INJECTION, SOLUTION SUBCUTANEOUS
Qty: 6 ML | Refills: 0 | Status: SHIPPED | OUTPATIENT
Start: 2022-07-12 | End: 2022-09-20

## 2022-08-30 ENCOUNTER — TELEPHONE (OUTPATIENT)
Dept: ENDOCRINOLOGY | Age: 67
End: 2022-08-30

## 2022-08-30 NOTE — TELEPHONE ENCOUNTER
The patient called to advise that her sugar numbers are dropping very low. She states morning number is in the 40s for the last 3 or 4 days, and has an afternoon reading today of 49. She says she uses an insulin pump \"630\". She had some recent dental work and can only eat soft foods and that might be part of the issue as well. She is schedule on Sept. 20th to see Dr. Akira Steinberg. She needs advice on how to adjust her insulin pump for these low numbers.

## 2022-08-30 NOTE — TELEPHONE ENCOUNTER
Message from staff:    \"The patient called to advise that her sugar numbers are dropping very low. She states morning number is in the 40s for the last 3 or 4 days, and has an afternoon reading today of 49. She says she uses an insulin pump \"630\". She had some recent dental work and can only eat soft foods and that might be part of the issue as well. She is schedule on Sept. 20th to see Dr. Eron Oliveros. She needs advice on how to adjust her insulin pump for these low numbers.  \"

## 2022-08-30 NOTE — TELEPHONE ENCOUNTER
I spoke with patient. She is having very low blood glucose levels at night, in the morning, and in the afternoon. We do get better after dinner until midnight. Adjusted basal rate settings as follows, 12 midnight 0.6 units/h, 6 AM to 7 PM 1.2 units/h, 7 PM to 12 midnight 1.4 units/h. Asked patient to call in 2 days if still low blood glucose levels.

## 2022-09-02 RX ORDER — FENOFIBRATE 145 MG/1
145 TABLET, COATED ORAL DAILY
Qty: 90 TABLET | Refills: 0 | Status: SHIPPED | OUTPATIENT
Start: 2022-09-02

## 2022-09-17 LAB
ALBUMIN/GLOBULIN RATIO: 1.7 RATIO (ref 0.8–2.6)
ALBUMIN: 4.1 G/DL (ref 3.5–5.2)
ALP BLD-CCNC: 73 U/L (ref 23–144)
ALT SERPL-CCNC: 17 U/L (ref 0–60)
AST SERPL-CCNC: 20 U/L (ref 0–55)
BILIRUB SERPL-MCNC: 0.3 MG/DL (ref 0–1.2)
BUN BLDV-MCNC: 27 MG/DL (ref 3–29)
BUN/CREAT BLD: 6 (ref 7–25)
CALCIUM SERPL-MCNC: 9.7 MG/DL (ref 8.5–10.5)
CHLORIDE BLD-SCNC: 101 MEQ/L (ref 96–110)
CHOLESTEROL: 137 MG/DL
CO2: 25 MEQ/L (ref 19–32)
CREAT SERPL-MCNC: 4.3 MG/DL (ref 0.5–1.2)
CREATININE URINE: 31.5 MG/DL
GLOBULIN: 2.4 G/DL (ref 1.9–3.6)
GLOMERULAR FILTRATION RATE: 11 MLS/MIN/1.73M2
GLUCOSE BLD-MCNC: 63 MG/DL (ref 70–99)
HBA1C MFR BLD: 5.5 % (ref 4–6)
HDLC SERPL-MCNC: 51 MG/DL
LDL CHOLESTEROL CALCULATED: 63 MG/DL
MICROALBUMIN UR-MCNC: 6040 MCG/DL
MICROALBUMIN/CREAT UR-RTO: 192 MCG/MG CREAT.
POTASSIUM SERPL-SCNC: 5 MEQ/L (ref 3.4–5.3)
SODIUM BLD-SCNC: 138 MEQ/L (ref 135–148)
STATUS: ABNORMAL
T3 FREE: 2.9 PG/ML (ref 2.3–4.2)
T4 FREE: 1.37 NG/DL (ref 0.8–1.8)
TOTAL PROTEIN: 6.5 G/DL (ref 6–8.3)
TRIGL SERPL-MCNC: 115 MG/DL
TSH SERPL DL<=0.05 MIU/L-ACNC: 1.22 MCIU/ML (ref 0.4–4.5)
VITAMIN D 25-HYDROXY: 60 NG/ML (ref 30–100)
VLDLC SERPL CALC-MCNC: 23 MG/DL (ref 4–38)

## 2022-09-20 ENCOUNTER — OFFICE VISIT (OUTPATIENT)
Dept: ENDOCRINOLOGY | Age: 67
End: 2022-09-20
Payer: COMMERCIAL

## 2022-09-20 VITALS
BODY MASS INDEX: 34.41 KG/M2 | WEIGHT: 187 LBS | HEIGHT: 62 IN | DIASTOLIC BLOOD PRESSURE: 78 MMHG | RESPIRATION RATE: 14 BRPM | OXYGEN SATURATION: 98 % | SYSTOLIC BLOOD PRESSURE: 138 MMHG | TEMPERATURE: 98 F | HEART RATE: 73 BPM

## 2022-09-20 DIAGNOSIS — E66.9 CLASS 1 OBESITY WITH SERIOUS COMORBIDITY AND BODY MASS INDEX (BMI) OF 34.0 TO 34.9 IN ADULT, UNSPECIFIED OBESITY TYPE: ICD-10-CM

## 2022-09-20 DIAGNOSIS — E55.9 VITAMIN D DEFICIENCY: ICD-10-CM

## 2022-09-20 DIAGNOSIS — Z79.4 CONTROLLED TYPE 2 DIABETES MELLITUS WITH DIABETIC NEPHROPATHY, WITH LONG-TERM CURRENT USE OF INSULIN (HCC): ICD-10-CM

## 2022-09-20 DIAGNOSIS — E04.2 MULTINODULAR GOITER: ICD-10-CM

## 2022-09-20 DIAGNOSIS — N18.5 CKD (CHRONIC KIDNEY DISEASE) STAGE 5, GFR LESS THAN 15 ML/MIN (HCC): ICD-10-CM

## 2022-09-20 DIAGNOSIS — I10 ESSENTIAL HYPERTENSION: ICD-10-CM

## 2022-09-20 DIAGNOSIS — E78.2 MIXED HYPERLIPIDEMIA: ICD-10-CM

## 2022-09-20 DIAGNOSIS — E11.40 TYPE 2 DIABETES, CONTROLLED, WITH NEUROPATHY (HCC): Primary | ICD-10-CM

## 2022-09-20 DIAGNOSIS — E11.21 CONTROLLED TYPE 2 DIABETES MELLITUS WITH DIABETIC NEPHROPATHY, WITH LONG-TERM CURRENT USE OF INSULIN (HCC): ICD-10-CM

## 2022-09-20 PROBLEM — E66.812 CLASS 2 OBESITY WITH BODY MASS INDEX (BMI) OF 35.0 TO 35.9 IN ADULT: Status: ACTIVE | Noted: 2022-09-20

## 2022-09-20 PROBLEM — E66.811 CLASS 1 OBESITY WITH BODY MASS INDEX (BMI) OF 34.0 TO 34.9 IN ADULT: Status: ACTIVE | Noted: 2022-09-20

## 2022-09-20 PROCEDURE — 2022F DILAT RTA XM EVC RTNOPTHY: CPT | Performed by: INTERNAL MEDICINE

## 2022-09-20 PROCEDURE — 1123F ACP DISCUSS/DSCN MKR DOCD: CPT | Performed by: INTERNAL MEDICINE

## 2022-09-20 PROCEDURE — 3044F HG A1C LEVEL LT 7.0%: CPT | Performed by: INTERNAL MEDICINE

## 2022-09-20 PROCEDURE — 1090F PRES/ABSN URINE INCON ASSESS: CPT | Performed by: INTERNAL MEDICINE

## 2022-09-20 PROCEDURE — 1036F TOBACCO NON-USER: CPT | Performed by: INTERNAL MEDICINE

## 2022-09-20 PROCEDURE — 3017F COLORECTAL CA SCREEN DOC REV: CPT | Performed by: INTERNAL MEDICINE

## 2022-09-20 PROCEDURE — G8400 PT W/DXA NO RESULTS DOC: HCPCS | Performed by: INTERNAL MEDICINE

## 2022-09-20 PROCEDURE — G8417 CALC BMI ABV UP PARAM F/U: HCPCS | Performed by: INTERNAL MEDICINE

## 2022-09-20 PROCEDURE — G8427 DOCREV CUR MEDS BY ELIG CLIN: HCPCS | Performed by: INTERNAL MEDICINE

## 2022-09-20 PROCEDURE — 99215 OFFICE O/P EST HI 40 MIN: CPT | Performed by: INTERNAL MEDICINE

## 2022-09-20 RX ORDER — DULAGLUTIDE 0.75 MG/.5ML
INJECTION, SOLUTION SUBCUTANEOUS
Qty: 6 ML | Refills: 1 | Status: SHIPPED | OUTPATIENT
Start: 2022-09-20 | End: 2022-10-18

## 2022-09-20 NOTE — PROGRESS NOTES
Mg Daley is a 77 y.o. female who presents for Type 2 diabetes mellitus. Current symptoms/problems include  hyperglycemia  and show no change. 1.  Type 2 diabetes, controlled, with neuropathy ([address] [E11.40]    Diagnosed with Type 2 diabetes mellitus in 1968. Comorbid conditions:  hyperlipidemia, CKD and Neuropathy     Current diabetic medications include: Novolog, Trulicity     Intolerance to diabetes medications: No     Weight trend: stable  Prior visit with dietician: yes  Current diet: on average, 3 meals per day  Current exercise: active     Current monitoring regimen: home blood tests - 4 times daily for last 60 days  Has brought blood glucose log/meter:  Yes  Home blood sugar records: fasting range: 50-90 and postprandial range:   Any episodes of hypoglycemia? Rare  Hypoglycemia frequency and time(s):  twice a month  Does patient have Glucagon emergency kit? No  Does patient have rapid acting carbohydrate? Yes  Does patient wear a medic alert bracelet or necklace? No     2. Mixed hyperlipidemia  Has muscle pain. 3. Obesity   Eats healthy. 4. Essential hypertension  No headaches. 5. Vitamin D deficiency  No bone pain. 6. CKD (chronic kidney disease) stage 5  No urination problems. 7. MNG  No difficulty swallowing, voice change, history of radiation, thyroid cancer in family. 8. Nephropathy  Urinates 5-6 times a day      ULTRASOUND THYROID, 2/21/2020 12:06 PM.       INDICATION: Thyroid nodule. Goiter. COMPARISON: Ultrasound thyroid, 10/5/2017. TECHNIQUE: Multiple transverse and longitudinal gray scale images were obtained through the    thyroid gland. FINDINGS:        Right lobe measures 2.2 x 2.5 x 5.3 cm. Left lobe measures 2.0 x 2.6 x 5.3 cm. Isthmus is 13 mm    in thickness. Thyroid gland is heterogeneous in echotexture throughout. There is    redemonstration of multiple nodules within the bilateral lobes, numbering at least 5.        Largest nodule is located inferiorly within the left lobe and is TI-RADS Category 4. This    measures 1.9 x 2.1 x 2.4 cm compared to 1.2 x 1.7 x 2.2 cm previously. This lesion is    heterogeneous, is wider than tall, and has regions of hypoechoic and hyperechoic components. No    evidence of cystic change or microcalcifications. Largest nodule in the right lobe measures 1.6 x 2.3 x 2.6 cm. This nodule is TI-RADS Category    4, wider than tall, and mildly heterogeneous with a few foci of microcalcification. This nodule    previously measured 1.2 x 1.7 x 2.2 cm. No definite new nodule. IMPRESSION:        BILATERAL TI-RADS CATEGORY 4 LESIONS SHOWING MILD INCREASE IN SIZE AS COMPARED TO PREVIOUS    STUDY 2017. CORRELATION WITH SHORT INTERVAL FOLLOW-UP OR FINE-NEEDLE ASPIRATION MAY BE HELPFUL    FOR FURTHER EVALUATION. PATIENT HAS PREVIOUSLY UNDERGONE BIOPSY OF MULTIPLE NODULES IN . Lillian Ramos M.D. Workstation IS:[id number]         CYTOLOGY REPORT  ______________________________________________________________________  Patient Name: Cherri Langford    Accession #:        Location: [address] [place] [address] #:  [id number]  Account #:    [id number]  Soc. Sec. #:         : 1955 (Age: 61)   Gender: F  Physician(s): Deysi Daugherty M.D. Collected: 3/1/2016  Received:  3/1/2016  ______________________________________________________________________  Source of Specimen(s)   of 4. Thyroid, Fine Needle , Right Upper    Clinical History  Pertinent Clinical History:   Other Clinical Conditions:    Clinical Diagnosis:  Thyroid Nodules    ______________________________________________________________________   FINAL CYTOLGIC DIAGNOSIS    1 of 4. Thyroid, Fine Needle , Right Upper:       Satisfactory for evaluation. Benign.   Colloid nodule.               ______________________________________________________________________  Cytotechnologist: DL    Electronically Signed   Iris Sylvester. Dane Blue MD  Pathologist  3/2/2016      Procedures/Addenda  FNA Immediate Specimen Evaluation     Date Ordered:     3/1/2016      Status: Signed Out        Date Complete:     3/1/2016     By: Liliya Massing       Date Reported:     3/2/2016         Preliminary Diagnosis  - Thyroid epithelial cells and colloid sufficient for evaluation. Results-Comments             Iris Sylvester. Dane Blue MD        Gross Description    Received in [facility] 28. 30ml red, cloudy fluid. 8 prepared slides 4 airdried, 4 fixed    ______________________________________________________________________                                   CPT Code(s): 1: Q0298430, 25885, [id number])           CYTOLOGY REPORT  ______________________________________________________________________  Patient Name: Maria Ines Dobson    Accession #:        Location: [address] [place] [address] #:  [id number]  Account #:    [id number]  Soc. Sec. #:         : 1955 (Age: 61)   Gender: F  Physician(s): Tonia Whitfield M.D. Collected: 3/1/2016  Received:  3/1/2016  ______________________________________________________________________  Source of Specimen(s)  2 of 4. Thyroid Fine Needle Aspiration, Right Lower    Clinical History  Pertinent Clinical History:   Other Clinical Conditions:    Clinical Diagnosis:  Thyroid nodules         ______________________________________________________________________   FINAL CYTOLGIC DIAGNOSIS    2 of 4. Thyroid Fine Needle Aspiration, Right Lower:       Satisfactory for evaluation. Benign. Adenomatoid or colloid nodule. Chronic lymphocytic thyroiditis.               ______________________________________________________________________  Cytotechnologist: DL    Electronically Signed   Iris Sylvester.  Dane Blue MD  Pathologist  3/2/2016      Procedures/Addenda  FNA Immediate Specimen Evaluation     Date Ordered:     3/1/2016      Status: Signed Out        Date Complete:     3/1/2016     By: Fernie Blum Micaela       Date Reported:     3/2/2016         Preliminary Diagnosis  - Thyroid epithelial cells and colloid sufficient for evaluation. Results-Comments             Lul Blum. Marcela Landon MD        Gross Description  [facility]. 30ml red, cloudy fluid. 8 prepared slides 4 airdried, 4 fixed    ______________________________________________________________________                                   CPT Code(s): 1: E6791784, 52649, [id number])           CYTOLOGY REPORT  ______________________________________________________________________  Patient Name: Mouna Rodgers    Accession #:        Location: [address] [place] [address]. #:  [id number]  Account #:    [id number]  Soc. Sec. #:         : 1955 (Age: 61)   Gender: F  Physician(s): Dwayne Angeles M.D. Collected: 3/1/2016  Received:  3/1/2016  ______________________________________________________________________  Source of Specimen(s)  3 of 4. Thyroid, Fine Needle Aspiration, Isthmus    Clinical History  Pertinent Clinical History:   Other Clinical Conditions:    Clinical Diagnosis:  Thyroid nodules    ______________________________________________________________________   FINAL CYTOLGIC DIAGNOSIS    3 of 4. Thyroid, Fine Needle Aspiration, Isthmus:       Satisfactory for evaluation. Benign. Colloid nodule.                 ______________________________________________________________________  Cytotechnologist: DL    Electronically Signed   Lul Blum. Marcela Landon MD  Pathologist  3/2/2016      Procedures/Addenda  FNA Immediate Specimen Evaluation     Date Ordered:     3/1/2016      Status: Signed Out        Date Complete:     3/1/2016     By: Vibha Rodrigez       Date Reported:     3/2/2016         Preliminary Diagnosis  - Thyroid epithelial cells and colloid sufficient for evaluation. Results-Comments             Lul Blum. Marcela Landon MD        Gross Description  Received in . [facility] 28. 30ml red, cloudy fluid.   8 prepared slides 4 airdried, 4 fixed    ______________________________________________________________________                                   CPT Code(s): 1: I4321892, B1010119, [id number])           CYTOLOGY REPORT  ______________________________________________________________________  Patient Name: Cathy Das    Accession #:        Location: [address] [place] [address] #:  [id number]  Account #:    [id number]  Soc. Sec. #:         : 1955 (Age: 61)   Gender: F  Physician(s): Justen Brown M.D. Collected: 3/1/2016  Received:  3/1/2016  ______________________________________________________________________  Source of Specimen(s)  . Thyroid, Fine Needle Aspiration, Left mid    Clinical History  Pertinent Clinical History:   Other Clinical Conditions:    Clinical Diagnosis:  Thyroid nodules    ______________________________________________________________________   FINAL CYTOLGIC DIAGNOSIS     4. Thyroid, Fine Needle Aspiration, Left mid:       Satisfactory for evaluation. Benign. Colloid nodule. .               ______________________________________________________________________  Cytotechnologist: DL    Electronically Signed   Jennyfer Maharaj. Evan Anguiano MD  Pathologist  3/2/2016      Procedures/Addenda  FNA Immediate Specimen Evaluation     Date Ordered:     3/1/2016      Status: Signed Out        Date Complete:     3/1/2016     By: Wesly Lovett       Date Reported:     3/2/2016         Preliminary Diagnosis  - Thyroid epithelial cells and colloid sufficient for evaluation. Results-Comments             Jennyfer Anguiano MD        Gross Description  Received in . [facility] 28. 30ml red, cloudy fluid.   8 prepared slides 4 airdried, 4 fixed    ______________________________________________________________________                                   CPT Code(s): 1: F5582007, 78546, [id number])           Lab Results   Component Value Date    LABA1C 5.5 2022     No results found for: EAG IMPRESSION:    1. Slight asymmetric enlargement of the right lobe of the thyroid gland as compared to the left lobe of the thyroid gland. There is a heterogeneous echotexture demonstrated in each lobe of the thyroid gland as well as along the isthmus. 2.  There are 3 nodules demonstrated in the right lobe of the thyroid gland which have not substantially changed in size compared to the previous ultrasound study. An additional nodule in the left lobe of the thyroid gland and along the isthmus have also not substantially changed in size. Workstation [employer]   Result Narrative   STUDY:  Ultrasound examination of the thyroid gland. CLINICAL:   Follow-up of thyroid gland nodules    COMPARISON:   2/1/2016    TECHNIQUE:   Ultrasound examination of the thyroid gland is performed with grayscale and with color Doppler. FINDINGS:     The right lobe of the thyroid gland measures 6.0 x 2.0 x 2.9 cm. On the previous ultrasound study the right lobe measured 5.3 x 2.2 x 2.8 cm. There is a diffusely heterogeneous echotexture in the right lobe of the thyroid gland. There is a well-defined hypoechoic nodule along the upper portion of the right lobe measuring 0.6 x 0.9 x 0.5 cm. There is a solid nodule in the midportion of the right lobe measuring 2.6 x 2.1 x 2.5 cm. This has slightly ill-defined margins. Inferiorly there is an additional solid nodule measuring 1.8 x 2.2 x 1.2 cm which may have a small amount of calcification along the wall. On the previous ultrasound study, the largest nodule in the midportion of the right lobe measured up to 2.8 cm and the nodule in the lower pole measured up to 2.4 cm indicating no substantial interval change in size. The left lobe of the thyroid gland currently measures 4.9 x 2.3 x 2.3 cm. On the previous ultrasound study the left lobe measured 5.4 x 2.8 x 2.6 cm. There is a heterogeneous echotexture in the left lobe of the thyroid gland.  There is a solid nodule in the mid to lower portion of the left lobe measuring 1.7 x 2.8 x 1.5 cm. On the previous study this measured up to 2.9 cm indicating no substantial interval change. The isthmus measures 6 mm in thickness. There is a nodule along the anterior aspect of the isthmus measuring 1.0 x 0.8 x 0.8 cm. On the previous ultrasound this measured up to 1 cm indicating no significant interval change.          Past Medical History:   Diagnosis Date    Anemia     Eye pressure     Hypertension     Kidney failure     Type 2 diabetes mellitus without complication [facility])       Patient Active Problem List   Diagnosis    Type 2 diabetes, controlled, with neuropathy ([facility] [address]    Mixed hyperlipidemia    Obesity (BMI 35.0-39.9 without comorbidity)    Essential hypertension    Vitamin D deficiency    CKD (chronic kidney disease) stage 5, GFR less than 15 ml/min (HCC)    Multinodular goiter    Controlled type 2 diabetes mellitus with diabetic nephropathy (HCC)    DM type 2, uncontrolled, with neuropathy ([facility] [address]    Uncontrolled type 2 diabetes mellitus with diabetic nephropathy (HCC)    Class 2 obesity with body mass index (BMI) of 36.0 to 36.9 in adult    Class 2 obesity with body mass index (BMI) of 35.0 to 35.9 in adult    Class 1 obesity with body mass index (BMI) of 34.0 to 34.9 in adult     Past Surgical History:   Procedure Laterality Date    COLONOSCOPY       Social History     Socioeconomic History    Marital status:      Spouse name: Not on file    Number of children: Not on file    Years of education: Not on file    Highest education level: Not on file   Occupational History    Not on file   Tobacco Use    Smoking status: Never    Smokeless tobacco: Never   Vaping Use    Vaping Use: Never used   Substance and Sexual Activity    Alcohol use: No    Drug use: No    Sexual activity: Never   Other Topics Concern    Not on file   Social History Narrative    Not on file     Social Determinants of Health     Financial Resource Strain: Not on file reaction(s): Cough     Family Status   Relation Name Status    Mother      Father      Anastasiia  Alive    Anastasiia  Alive       Review of Systems  Constitutional: no fatigue, no fever, no recent weight gain, no recent weight loss, no changes in appetite  Eyes: no eye pain, no change in vision, no eye redness, no eye irritation, no double vision  Ears, nose, throat: no nasal congestion, no sore throat, no earache, no decrease in hearing, no hoarseness, no dry mouth, no sinus problems, no difficulty swallowing, no neck lumps, no dental problems, no mouth sores, no ringing in ears  Pulmonary: no shortness of breath, no wheezing, no dyspnea on exertion, no cough  Cardiovascular: no chest pain, no lower extremity edema, no orthopnea, no intermittent leg claudication, no palpitations  Gastrointestinal: no abdominal pain, no nausea, no vomiting, no diarrhea, no constipation, no dysphagia, no heartburn, no bloating  Genitourinary: no dysuria, no urinary incontinence, no urinary hesitancy, no urinary frequency, no feelings of urinary urgency, no nocturia  Musculoskeletal: no joint swelling, no joint stiffness, has joint pain, no muscle cramps, no muscle pain, no bone pain  Integument/Breast: no hair loss, no skin rashes, no skin lesions, no itching, no dry skin  Neurological: no numbness, no tingling, no weakness, no confusion, no headaches, no dizziness, no fainting, no tremors, no decrease in memory, has balance problems  Psychiatric: no anxiety, no depression, no insomnia  Hematologic/Lymphatic: no tendency for easy bleeding, no swollen lymph nodes, no tendency for easy bruising  Immunology: no seasonal allergies, no frequent infections, no frequent illnesses  Endocrine: no temperature intolerance     OBJECTIVE:  /78   Pulse 73   Temp 98 °F (36.7 °C)   Resp 14   Ht 5' 2\" (1.575 m)   Wt 187 lb (84.8 kg)   SpO2 98%   BMI 34.20 kg/m²      Wt Readings from Last 3 Encounters:   22 187 lb (84.8 kg) 06/07/22 192 lb (87.1 kg)   09/28/21 202 lb (91.6 kg)         Constitutional: no acute distress, well appearing and well nourished  Psychiatric: oriented to person, place and time, judgement and insight and normal, recent and remote memory and intact and mood and affect are normal  Skin: skin and subcutaneous tissue is normal without mass, normal turgor  Head and Face: examination of head and face revealed no abnormalities  Eyes: no lid or conjunctival swelling, erythema or discharge, pupils are normal, equal, round, reactive to light  Ears/Nose: external inspection of ears and nose revealed no abnormalities, hearing is grossly normal  Oropharynx/Mouth/Face: lips, tongue and gums are normal with no lesions, the voice quality was normal  Neck: neck is supple and symmetric, with midline trachea and no masses, thyroid is normal  Lymphatics: normal cervical lymph nodes, normal supraclavicular nodes  Pulmonary: no increased work of breathing or signs of respiratory distress, lungs are clear to auscultation  Cardiovascular: normal heart rate and rhythm, normal S1 and S2, no murmurs and pedal pulses and 2+ bilaterally, no edema  Abdomen: abdomen is soft, non-tender with no masses  Musculoskeletal: normal gait and station and exam of the digits and nails are normal  Neurological: normal coordination and normal general cortical function    Visual inspection:  Deformity/amputation: present hammer toes  Skin lesions/pre-ulcerative calluses: absent  Edema: right- 1+, left- 1+      Sensory exam:  Monofilament sensation: normal  (minimum of 5 random plantar locations tested, avoiding callused areas - > 1 area with absence of sensation is + for neuropathy)     Plus at least one of the following:  Pulses: normal  Proprioception: Intact  Vibration (128 Hz): Impaired    ASSESSMENT/PLAN:    1.  Type 2 diabetes mellitus, controlled, with neuropathy (HCC)  Low BG in AM and at night  Recommend to continue insulin pump for diabetes plan  Made a decision to obtain old records Yes  Reviewed old records No  Obtained history from other than patient No    Comfort Montez was counseled regarding symptoms of diabetes diagnosis, course and complications of disease if inadequately treated, side effects of medications, diagnosis, treatment options, and prognosis, risks, benefits, complications, and alternatives of treatment, labs, imaging and other studies and treatment targets and goals, thyroid cancer risk, insulin pump data, settings, arrangements. She understands instructions and counseling. Total time I spent for this encounter 40 minutes    Return in about 3 months (around 12/20/2022) for fatigue.

## 2022-10-18 RX ORDER — DULAGLUTIDE 0.75 MG/.5ML
INJECTION, SOLUTION SUBCUTANEOUS
Qty: 6 ML | Refills: 0 | Status: SHIPPED | OUTPATIENT
Start: 2022-10-18

## 2022-10-25 ENCOUNTER — OFFICE VISIT (OUTPATIENT)
Dept: ORTHOPEDIC SURGERY | Age: 67
End: 2022-10-25
Payer: COMMERCIAL

## 2022-10-25 VITALS — HEIGHT: 62 IN | BODY MASS INDEX: 34.41 KG/M2 | WEIGHT: 187 LBS

## 2022-10-25 DIAGNOSIS — G56.02 CARPAL TUNNEL SYNDROME ON LEFT: Primary | ICD-10-CM

## 2022-10-25 DIAGNOSIS — R20.0 NUMBNESS OF RIGHT HAND: ICD-10-CM

## 2022-10-25 DIAGNOSIS — M65.312 TRIGGER THUMB OF LEFT HAND: ICD-10-CM

## 2022-10-25 DIAGNOSIS — R20.0 NUMBNESS OF LEFT HAND: ICD-10-CM

## 2022-10-25 PROCEDURE — 99203 OFFICE O/P NEW LOW 30 MIN: CPT | Performed by: ORTHOPAEDIC SURGERY

## 2022-10-25 NOTE — PROGRESS NOTES
CHIEF COMPLAINT:   1-Bilateral wrist Pain with numbness and tingling/ carpal tunnel syndrome  2-Left thumb pain/ trigger thumb at the IP joint(A-2 pulley)  3-Chronic kidney disease on hemodialysis Monday, Wednesday, Friday with left arm fistula  4-Diabetes with diabetic neuropathy    HISTORY:  Ms. Warner Course is 77 y.o.  female right handed presents today for consultation request from [facility] for evaluation of a bilateral wrist and left thumb pain with numbness and tingling and triggering which started in August 2022 and is worsening. Denies trauma or injury. The patient is complaining of bilateral equal wrist pain with numbness and tingling. She states the left thumb is getting stuck at the DIP joint. This is worse in the morning and nothing makes pain better. She has tried bracing with no improvement. She is refusing cortisone injections due to her diabetes. She had a EMG of bilateral upper extremities done on 10/4/2022 and was referred here for further management. No other complaint. She is a diabetic with diabetic neuropathy with chronic kidney disease. She is on hemodialysis Monday Wednesday and Friday with a fistula in her left arm. Denies smoking.     Past Medical History:   Diagnosis Date    Anemia     Eye pressure     Hypertension     Kidney failure     Type 2 diabetes mellitus without complication (HCC)        Past Surgical History:   Procedure Laterality Date    COLONOSCOPY         Social History     Socioeconomic History    Marital status:      Spouse name: Not on file    Number of children: Not on file    Years of education: Not on file    Highest education level: Not on file   Occupational History    Not on file   Tobacco Use    Smoking status: Never    Smokeless tobacco: Never   Vaping Use    Vaping Use: Never used   Substance and Sexual Activity    Alcohol use: No    Drug use: No    Sexual activity: Never   Other Topics Concern    Not on file   Social History Narrative    Not on file     Social Determinants of Health     Financial Resource Strain: Not on file   Food Insecurity: Not on file   Transportation Needs: Not on file   Physical Activity: Not on file   Stress: Not on file   Social Connections: Not on file   Intimate Partner Violence: Not on file   Housing Stability: Not on file       Family History   Problem Relation Age of Onset    Diabetes Mother     Heart Attack Mother     Cancer Father     No Known Problems Daughter     No Known Problems Daughter        Current Outpatient Medications on File Prior to Visit   Medication Sig Dispense Refill    TRULICITY 5.32 MD/9.4NP SOPN ADMINISTER 0.75 MG UNDER THE SKIN 1 TIME A WEEK 6 mL 0    fenofibrate (TRICOR) 145 MG tablet TAKE 1 TABLET BY MOUTH DAILY 90 tablet 0    CONTOUR NEXT TEST strip 1 each by In Vitro route 4 times daily As needed. 375 strip 3    Insulin Infusion Pump Supplies (MINIMED PUMP RESERVOIR 3ML) MISC Change reservoir q 3 days 9 each 1    IV Sets-Tubing (INFUSION SET 23\") MISC Change infusion set q 3 days (please dispense minimed 630G compatible 23' set) 25 each 1    insulin aspart (NOVOLOG) 100 UNIT/ML injection vial TOTAL DAILY DOSE 60 UNITS  DAILY. USE VIA INSULIN PUMP 30 mL 2    Calcium Acetate, Phos Binder, (CALCIUM ACETATE PO) Take 675 mg by mouth      diphenoxylate-atropine (LOMOTIL) 2.5-0.025 MG per tablet Take 1 tablet by mouth 4 times daily as needed for Diarrhea.      calcitRIOL (ROCALTROL) 0.25 MCG capsule Take 0.25 mcg by mouth daily      hydrALAZINE (APRESOLINE) 50 MG tablet Take 1 tablet by mouth 2 times daily      furosemide (LASIX) 80 MG tablet Take 160 mg by mouth 2 times daily       metoprolol tartrate (LOPRESSOR) 50 MG tablet Take 50 mg by mouth 2 times daily      amLODIPine (NORVASC) 5 MG tablet Take 5 mg by mouth daily      vitamin D (CHOLECALCIFEROL) 5000 units CAPS capsule Take 5,000 Units by mouth daily       No current facility-administered medications on file prior to visit. Pertinent items are noted in HPI  Review of systems reviewed from Patient History Form and available in the patient's chart under the Media tab. No change noted. PHYSICAL EXAMINATION:  Ms. Quynh Alanis is a very pleasant 77 y.o.  female who presents today in no acute distress, awake, alert, and oriented. She is well dressed, nourished and  groomed. Patient with normal affect. Height is  5' 2\" (1.575 m), weight is 187 lb (84.8 kg), Body mass index is 34.2 kg/m². Resting respiratory rate is 16. Examination of the gait, showed that the patient walks with No limp . Examination of both Upper extremities showing a normal range of motion of the right hand compare to the other side. There is no swelling that can be seen. There is triggering of her left thumb at the IP joint. Examination for Carpal Tunnel Syndrome shows Carpal Tunnel Compression Test to be moderately positive on the right & moderately positive on the left. Phalen's Maneuver is moderately positive on the right & moderately positive on the left. The patient displays moderate baseline symptoms to potentially confound the exam.  The thenar musculature is not atrophied & weakened. IMAGING: Kevin Haynes were reviewed, taken today in the office, 3 views of the bilateral wrist, and showed no fracture, no other abnormalities. IMPRESSION:   1-Bilateral wrist Pain with numbness and tingling/ carpal tunnel syndrome  2-Left thumb pain/ trigger thumb at the IP joint(A-2 pulley)  3-Chronic kidney disease on hemodialysis Monday, Wednesday, Friday with left arm fistula  4-Diabetes with diabetic neuropathy      PLAN:  I assured the patient that the xray is negative for acute fracture and discussed with her surgical as well as nonsurgical options for the carpal tunnel as well as the trigger thumb at A-2 pulley. The patient can take NSAIDs PRN and recommended wearing the wrist brace at night.  We recommended Cortisone injection of the carpal tunnel and the patient is refusing at this point due to her diabetes. Since this pain is interfering with her ADLs and is keeping her awake at night, she would like to proceed with the surgical option. I discussed the risks and benefits of surgery with the patient, including but not limited to infection, bleeding, pain, injury to nerves or blood vessels failure of the surgery and need for additional surgery. All the patient's questions were answered. We discussed an expected post-operative course. she  is understanding of this and wishes to proceed. She will call to schedule surgery on a Thursday at [facility] and due to the timing of her hemodialysis. Thank you very much for the kind consultation and allowing me to participate in this patient's care. I will continue to keep you apprised of her progress.         Louise Kuhn MD

## 2022-10-26 ENCOUNTER — TELEPHONE (OUTPATIENT)
Dept: ORTHOPEDIC SURGERY | Age: 67
End: 2022-10-26

## 2022-10-26 NOTE — TELEPHONE ENCOUNTER
PA requsted via [employer] by online thru [employer] w/clinicals.   Reference # [id number]

## 2022-11-07 NOTE — TELEPHONE ENCOUNTER
Auth: # [id number]    Date: 11/03/22 thru 12/03/22  Type of SX:  Outpatient  Location: [facility]  CPT: 11986, 58958   DX Code: G56.00  SX area: Lt hand  Insurance: [employer]

## 2022-11-08 ENCOUNTER — TELEPHONE (OUTPATIENT)
Dept: ORTHOPEDIC SURGERY | Age: 67
End: 2022-11-08

## 2022-11-08 NOTE — TELEPHONE ENCOUNTER
Surgery and/or Procedure Scheduling     Contact Name: Amy Stevens Request: [facility]  Patient Contact Number: [phone]

## 2022-11-08 NOTE — TELEPHONE ENCOUNTER
OK to add to surgery schedule this Thursday per [facility]. Authorization scanned into media.

## 2022-11-08 NOTE — PROGRESS NOTES
Name_______________________________________Printed:____________________  Date and time of surgery__11/10/2022_____0700_________________Arrival Time:_________0600_______   1. The instructions given regarding when and if a patient needs to stop oral intake prior to surgery varies. Follow the specific instructions you were given                  x__Nothing to eat or to drink after Midnight the night before.                   ____Carbo loading or ERAS instructions will be given to select patients-if you have been given those instructions -please do the following                           The evening before your surgery after dinner before midnight drink 40 ounces of gatorade. If you are diabetic use sugar free. The morning of surgery drink 40 ounces of water. This needs to be finished 3 hours prior to your surgery start time. 2. Take the following pills with a small sip of water on the morning of surgery_____amlodipine, metoprolol______________________________________________                  Do not take blood pressure medications ending in pril or sartan the joe prior to surgery or the morning of surgery_   3. Aspirin, Ibuprofen, Advil, Naproxen, Vitamin E and other Anti-inflammatory products and supplements should be stopped for 5 -7days before surgery or as directed by your physician. 4. Check with your Doctor regarding stopping Plavix, Coumadin,Eliquis, Lovenox,Effient,Pradaxa,Xarelto, Fragmin or other blood thinners and follow their instructions. 5. Do not smoke, and do not drink any alcoholic beverages 24 hours prior to surgery. This includes NA Beer. Refrain from the usage of any recreational drugs. 6. You may brush your teeth and gargle the morning of surgery. DO NOT SWALLOW WATER   7. You MUST make arrangements for a responsible adult to stay on site while you are here and take you home after your surgery. You will not be allowed to leave alone or drive yourself home.   It is strongly suggested someone stay with you the first 24 hrs. Your surgery will be cancelled if you do not have a ride home. 8. A parent/legal guardian must accompany a child scheduled for surgery and plan to stay at the hospital until the child is discharged. Please do not bring other children with you. 9. Please wear simple, loose fitting clothing to the hospital.  Benjamine Si not bring valuables (money, credit cards, checkbooks, etc.) Do not wear any makeup (including no eye makeup) or nail polish on your fingers or toes. 10. DO NOT wear any jewelry or piercings on day of surgery. All body piercing jewelry must be removed. 11. If you have ___dentures, they will be removed before going to the OR; we will provide you a container. If you wear ___contact lenses or ___glasses, they will be removed; please bring a case for them. 12. Please see your family doctor/pediatrician for a history & physical and/or concerning medications. Bring any test results/reports from your physician's office. PCP__________________Phone___________H&P Appt. Date________             13 If you  have a Living Will and Durable Power of  for Healthcare, please bring in a copy. 15. Notify your Surgeon if you develop any illness between now and surgery  time, cough, cold, fever, sore throat, nausea, vomiting, etc.  Please notify your surgeon if you experience dizziness, shortness of breath or blurred vision between now & the time of your surgery             15. DO NOT shave your operative site 96 hours prior to surgery. For face & neck surgery, men may use an electric razor 48 hours prior to surgery. 16. Shower the night before or morning of surgery using an antibacterial soap or as you have been instructed. 17. To provide excellent care visitors will be limited to one in the room at any given time. 18.  Please bring picture ID and insurance card.              19.  Visit our web site for additional information:  [email or web address]              20.During flu season no children under the age of 15 are permitted in the hospital for the safety of all patients. 21. If you take a long acting insulin in the evening only  take half of your usual  dose the night  before your procedure              22. If you use a c-pap please bring DOS if staying overnight,             23.For your convenience [facility] has a pharmacy on site to fill your prescriptions. 24. If you use oxygen and have a portable tank please bring it  with you the DOS             25. Bring a complete list of all your medications with name and dose include any supplements. 26. Other__________________________________________   *Please call pre admission testing if you any further questions   Issac Lange   [address] [place] [address]. [place]  [phone]   [address] [place] [address]       VISITOR POLICY(subject to change)    Current policy is 2 visitors per patient. No children. Mask is  at the discretion of the facility. Visiting hours are 8a-8p. Overnight visitors will be at the discretion of the nurse. All policies subject to change. All above information reviewed with patient in person or by phone. Patient verbalizes understanding. All questions and concerns addressed.                                                                                                  Patient/Rep_____patient_______________                                                                                                                                    PRE OP INSTRUCTIONS

## 2022-11-10 ENCOUNTER — ANESTHESIA EVENT (OUTPATIENT)
Dept: OPERATING ROOM | Age: 67
End: 2022-11-10
Payer: COMMERCIAL

## 2022-11-10 ENCOUNTER — ANESTHESIA (OUTPATIENT)
Dept: OPERATING ROOM | Age: 67
End: 2022-11-10
Payer: COMMERCIAL

## 2022-11-10 ENCOUNTER — HOSPITAL ENCOUNTER (OUTPATIENT)
Age: 67
Setting detail: OUTPATIENT SURGERY
Discharge: HOME | End: 2022-11-10
Attending: ORTHOPAEDIC SURGERY | Admitting: ORTHOPAEDIC SURGERY
Payer: COMMERCIAL

## 2022-11-10 VITALS
BODY MASS INDEX: 33.52 KG/M2 | HEART RATE: 77 BPM | WEIGHT: 189.2 LBS | OXYGEN SATURATION: 97 % | HEIGHT: 63 IN | TEMPERATURE: 97.3 F | DIASTOLIC BLOOD PRESSURE: 78 MMHG | SYSTOLIC BLOOD PRESSURE: 149 MMHG | RESPIRATION RATE: 12 BRPM

## 2022-11-10 DIAGNOSIS — M65.312 TRIGGER THUMB OF LEFT HAND: ICD-10-CM

## 2022-11-10 DIAGNOSIS — G56.02 CARPAL TUNNEL SYNDROME ON LEFT: Primary | ICD-10-CM

## 2022-11-10 LAB
ANION GAP SERPL CALCULATED.3IONS-SCNC: 9 MMOL/L (ref 3–16)
BUN BLDV-MCNC: 23 MG/DL (ref 7–20)
CALCIUM SERPL-MCNC: 9.7 MG/DL (ref 8.3–10.6)
CHLORIDE BLD-SCNC: 102 MMOL/L (ref 99–110)
CO2: 27 MMOL/L (ref 21–32)
CREAT SERPL-MCNC: 5.2 MG/DL (ref 0.6–1.2)
GFR SERPL CREATININE-BSD FRML MDRD: 9 ML/MIN/{1.73_M2}
GLUCOSE BLD-MCNC: 101 MG/DL (ref 70–99)
GLUCOSE BLD-MCNC: 103 MG/DL (ref 70–99)
GLUCOSE BLD-MCNC: 111 MG/DL (ref 70–99)
PERFORMED ON: ABNORMAL
PERFORMED ON: ABNORMAL
POTASSIUM SERPL-SCNC: 5.4 MMOL/L (ref 3.5–5.1)
SODIUM BLD-SCNC: 138 MMOL/L (ref 136–145)

## 2022-11-10 PROCEDURE — 6360000002 HC RX W HCPCS: Performed by: ORTHOPAEDIC SURGERY

## 2022-11-10 PROCEDURE — 3700000000 HC ANESTHESIA ATTENDED CARE: Performed by: ORTHOPAEDIC SURGERY

## 2022-11-10 PROCEDURE — 2500000003 HC RX 250 WO HCPCS: Performed by: NURSE ANESTHETIST, CERTIFIED REGISTERED

## 2022-11-10 PROCEDURE — 7100000011 HC PHASE II RECOVERY - ADDTL 15 MIN: Performed by: ORTHOPAEDIC SURGERY

## 2022-11-10 PROCEDURE — 3600000013 HC SURGERY LEVEL 3 ADDTL 15MIN: Performed by: ORTHOPAEDIC SURGERY

## 2022-11-10 PROCEDURE — 6360000002 HC RX W HCPCS: Performed by: NURSE ANESTHETIST, CERTIFIED REGISTERED

## 2022-11-10 PROCEDURE — 3700000001 HC ADD 15 MINUTES (ANESTHESIA): Performed by: ORTHOPAEDIC SURGERY

## 2022-11-10 PROCEDURE — 80048 BASIC METABOLIC PNL TOTAL CA: CPT

## 2022-11-10 PROCEDURE — 7100000001 HC PACU RECOVERY - ADDTL 15 MIN: Performed by: ORTHOPAEDIC SURGERY

## 2022-11-10 PROCEDURE — 7100000010 HC PHASE II RECOVERY - FIRST 15 MIN: Performed by: ORTHOPAEDIC SURGERY

## 2022-11-10 PROCEDURE — 7100000000 HC PACU RECOVERY - FIRST 15 MIN: Performed by: ORTHOPAEDIC SURGERY

## 2022-11-10 PROCEDURE — 64721 CARPAL TUNNEL SURGERY: CPT | Performed by: ORTHOPAEDIC SURGERY

## 2022-11-10 PROCEDURE — A4217 STERILE WATER/SALINE, 500 ML: HCPCS | Performed by: ORTHOPAEDIC SURGERY

## 2022-11-10 PROCEDURE — 26055 INCISE FINGER TENDON SHEATH: CPT | Performed by: ORTHOPAEDIC SURGERY

## 2022-11-10 PROCEDURE — 3600000003 HC SURGERY LEVEL 3 BASE: Performed by: ORTHOPAEDIC SURGERY

## 2022-11-10 PROCEDURE — 2709999900 HC NON-CHARGEABLE SUPPLY: Performed by: ORTHOPAEDIC SURGERY

## 2022-11-10 PROCEDURE — 2500000003 HC RX 250 WO HCPCS: Performed by: ORTHOPAEDIC SURGERY

## 2022-11-10 PROCEDURE — 2580000003 HC RX 258: Performed by: ORTHOPAEDIC SURGERY

## 2022-11-10 RX ORDER — TRAMADOL HYDROCHLORIDE 50 MG/1
50 TABLET ORAL EVERY 6 HOURS PRN
Qty: 12 TABLET | Refills: 0 | Status: SHIPPED | OUTPATIENT
Start: 2022-11-10 | End: 2022-11-13

## 2022-11-10 RX ORDER — SODIUM CHLORIDE 0.9 % (FLUSH) 0.9 %
5-40 SYRINGE (ML) INJECTION EVERY 12 HOURS SCHEDULED
Status: DISCONTINUED | OUTPATIENT
Start: 2022-11-10 | End: 2022-11-10 | Stop reason: HOSPADM

## 2022-11-10 RX ORDER — SODIUM CHLORIDE 0.9 % (FLUSH) 0.9 %
5-40 SYRINGE (ML) INJECTION PRN
Status: DISCONTINUED | OUTPATIENT
Start: 2022-11-10 | End: 2022-11-10 | Stop reason: HOSPADM

## 2022-11-10 RX ORDER — ONDANSETRON 2 MG/ML
INJECTION INTRAMUSCULAR; INTRAVENOUS PRN
Status: DISCONTINUED | OUTPATIENT
Start: 2022-11-10 | End: 2022-11-10 | Stop reason: SDUPTHER

## 2022-11-10 RX ORDER — SODIUM CHLORIDE 9 MG/ML
INJECTION, SOLUTION INTRAVENOUS CONTINUOUS
Status: DISCONTINUED | OUTPATIENT
Start: 2022-11-10 | End: 2022-11-10 | Stop reason: HOSPADM

## 2022-11-10 RX ORDER — LORAZEPAM 2 MG/ML
0.5 INJECTION INTRAMUSCULAR
Status: DISCONTINUED | OUTPATIENT
Start: 2022-11-10 | End: 2022-11-10 | Stop reason: HOSPADM

## 2022-11-10 RX ORDER — BUPIVACAINE HYDROCHLORIDE 5 MG/ML
INJECTION, SOLUTION EPIDURAL; INTRACAUDAL
Status: COMPLETED | OUTPATIENT
Start: 2022-11-10 | End: 2022-11-10

## 2022-11-10 RX ORDER — SODIUM CHLORIDE 9 MG/ML
25 INJECTION, SOLUTION INTRAVENOUS PRN
Status: DISCONTINUED | OUTPATIENT
Start: 2022-11-10 | End: 2022-11-10 | Stop reason: HOSPADM

## 2022-11-10 RX ORDER — CEPHALEXIN 500 MG/1
500 CAPSULE ORAL 4 TIMES DAILY
Qty: 12 CAPSULE | Refills: 0 | Status: SHIPPED | OUTPATIENT
Start: 2022-11-10 | End: 2022-11-13

## 2022-11-10 RX ORDER — FENTANYL CITRATE 50 UG/ML
25 INJECTION, SOLUTION INTRAMUSCULAR; INTRAVENOUS EVERY 5 MIN PRN
Status: DISCONTINUED | OUTPATIENT
Start: 2022-11-10 | End: 2022-11-10 | Stop reason: HOSPADM

## 2022-11-10 RX ORDER — FENTANYL CITRATE 50 UG/ML
INJECTION, SOLUTION INTRAMUSCULAR; INTRAVENOUS PRN
Status: DISCONTINUED | OUTPATIENT
Start: 2022-11-10 | End: 2022-11-10 | Stop reason: SDUPTHER

## 2022-11-10 RX ORDER — OXYCODONE HYDROCHLORIDE 5 MG/1
5 TABLET ORAL PRN
Status: DISCONTINUED | OUTPATIENT
Start: 2022-11-10 | End: 2022-11-10 | Stop reason: HOSPADM

## 2022-11-10 RX ORDER — PROPOFOL 10 MG/ML
INJECTION, EMULSION INTRAVENOUS PRN
Status: DISCONTINUED | OUTPATIENT
Start: 2022-11-10 | End: 2022-11-10 | Stop reason: SDUPTHER

## 2022-11-10 RX ORDER — LABETALOL HYDROCHLORIDE 5 MG/ML
10 INJECTION, SOLUTION INTRAVENOUS
Status: DISCONTINUED | OUTPATIENT
Start: 2022-11-10 | End: 2022-11-10 | Stop reason: HOSPADM

## 2022-11-10 RX ORDER — DIPHENHYDRAMINE HYDROCHLORIDE 50 MG/ML
12.5 INJECTION INTRAMUSCULAR; INTRAVENOUS
Status: DISCONTINUED | OUTPATIENT
Start: 2022-11-10 | End: 2022-11-10 | Stop reason: HOSPADM

## 2022-11-10 RX ORDER — DEXAMETHASONE SODIUM PHOSPHATE 4 MG/ML
INJECTION, SOLUTION INTRA-ARTICULAR; INTRALESIONAL; INTRAMUSCULAR; INTRAVENOUS; SOFT TISSUE PRN
Status: DISCONTINUED | OUTPATIENT
Start: 2022-11-10 | End: 2022-11-10 | Stop reason: SDUPTHER

## 2022-11-10 RX ORDER — HYDROMORPHONE HCL 110MG/55ML
0.5 PATIENT CONTROLLED ANALGESIA SYRINGE INTRAVENOUS EVERY 5 MIN PRN
Status: DISCONTINUED | OUTPATIENT
Start: 2022-11-10 | End: 2022-11-10 | Stop reason: HOSPADM

## 2022-11-10 RX ORDER — PROCHLORPERAZINE EDISYLATE 5 MG/ML
5 INJECTION INTRAMUSCULAR; INTRAVENOUS
Status: DISCONTINUED | OUTPATIENT
Start: 2022-11-10 | End: 2022-11-10 | Stop reason: HOSPADM

## 2022-11-10 RX ORDER — ONDANSETRON 2 MG/ML
4 INJECTION INTRAMUSCULAR; INTRAVENOUS
Status: DISCONTINUED | OUTPATIENT
Start: 2022-11-10 | End: 2022-11-10 | Stop reason: HOSPADM

## 2022-11-10 RX ORDER — LIDOCAINE HYDROCHLORIDE 20 MG/ML
INJECTION, SOLUTION EPIDURAL; INFILTRATION; INTRACAUDAL; PERINEURAL PRN
Status: DISCONTINUED | OUTPATIENT
Start: 2022-11-10 | End: 2022-11-10 | Stop reason: SDUPTHER

## 2022-11-10 RX ORDER — OXYCODONE HYDROCHLORIDE 5 MG/1
10 TABLET ORAL PRN
Status: DISCONTINUED | OUTPATIENT
Start: 2022-11-10 | End: 2022-11-10 | Stop reason: HOSPADM

## 2022-11-10 RX ADMIN — PROPOFOL 130 MG: 10 INJECTION, EMULSION INTRAVENOUS at 06:59

## 2022-11-10 RX ADMIN — ONDANSETRON 4 MG: 2 INJECTION INTRAMUSCULAR; INTRAVENOUS at 07:06

## 2022-11-10 RX ADMIN — DEXAMETHASONE SODIUM PHOSPHATE 4 MG: 4 INJECTION, SOLUTION INTRAMUSCULAR; INTRAVENOUS at 07:04

## 2022-11-10 RX ADMIN — FENTANYL CITRATE 50 MCG: 50 INJECTION, SOLUTION INTRAMUSCULAR; INTRAVENOUS at 07:04

## 2022-11-10 RX ADMIN — LIDOCAINE HYDROCHLORIDE 50 MG: 20 INJECTION, SOLUTION EPIDURAL; INFILTRATION; INTRACAUDAL; PERINEURAL at 06:59

## 2022-11-10 RX ADMIN — SODIUM CHLORIDE: 9 INJECTION, SOLUTION INTRAVENOUS at 06:49

## 2022-11-10 RX ADMIN — CEFAZOLIN 2000 MG: 2 INJECTION, POWDER, FOR SOLUTION INTRAMUSCULAR; INTRAVENOUS at 06:53

## 2022-11-10 ASSESSMENT — PAIN SCALES - GENERAL: PAINLEVEL_OUTOF10: 0

## 2022-11-10 NOTE — DISCHARGE INSTRUCTIONS
sets for 24 hours after surgery. A responsible adult needs to be with you for 24 hours. You may experience lightheadedness, dizziness, or sleepiness following surgery. Rest at home today- increase activity as tolerated. Progress slowly to a regular diet unless your physician has instructed you otherwise. Drink plenty of water. If persistent nausea and vomiting becomes a problem, call your physician. Coughing, sore throat and muscle aches are other side effects of anesthesia, and should improve with time. Do not drive or operate machinery while taking narcotics.

## 2022-11-10 NOTE — ANESTHESIA PRE PROCEDURE
Department of Anesthesiology  Preprocedure Note       Name:  Bernard Bird   Age:  77 y.o.  :  1955                                          MRN:  [id number]         Date:  11/10/2022      Surgeon: Rocael Salazar):  Tristan Abdalla MD    Procedure: Procedure(s):  LEFT CARPAL TUNNEL RELEASE  LEFT THUMB INTERPHALANGEAL JOINT TRIGGER FINGER RELEASE    Medications prior to admission:   Prior to Admission medications    Medication Sig Start Date End Date Taking? Authorizing Provider   TRULICITY 6.21 XS/2.8XE SOPN ADMINISTER 0.75 MG UNDER THE SKIN 1 TIME A WEEK 10/18/22   Delfino Roland MD   fenofibrate (TRICOR) 145 MG tablet TAKE 1 TABLET BY MOUTH DAILY 22   Delfino Roland MD   CONTOUR NEXT TEST strip 1 each by In Vitro route 4 times daily As needed. 22   Delfino Roland MD   Insulin Infusion Pump Supplies (MINIMED PUMP RESERVOIR 3ML) MISC Change reservoir q 3 days 22   Delfino Roland MD   IV Sets-Tubing (INFUSION SET 23\") MISC Change infusion set q 3 days (please dispense minimed 630G compatible 23' set) 22   Delfino Roland MD   insulin aspart (NOVOLOG) 100 UNIT/ML injection vial TOTAL DAILY DOSE 60 UNITS  DAILY. USE VIA INSULIN PUMP 22   Delfino Roland MD   Calcium Acetate, Phos Binder, (CALCIUM ACETATE PO) Take 675 mg by mouth    Historical Provider, MD   diphenoxylate-atropine (LOMOTIL) 2.5-0.025 MG per tablet Take 1 tablet by mouth 4 times daily as needed for Diarrhea.     Historical Provider, MD   calcitRIOL (ROCALTROL) 0.25 MCG capsule Take 0.25 mcg by mouth daily 20   Historical Provider, MD   hydrALAZINE (APRESOLINE) 50 MG tablet Take 1 tablet by mouth 2 times daily    Historical Provider, MD   furosemide (LASIX) 80 MG tablet Take 160 mg by mouth 2 times daily     Historical Provider, MD   metoprolol tartrate (LOPRESSOR) 50 MG tablet Take 50 mg by mouth 2 times daily    Historical Provider, MD   amLODIPine (NORVASC) 5 MG tablet Take 5 mg by mouth daily    Historical Provider, MD vitamin D (CHOLECALCIFEROL) 5000 units CAPS capsule Take 5,000 Units by mouth daily    Historical Provider, MD       Current medications:    Current Facility-Administered Medications   Medication Dose Route Frequency Provider Last Rate Last Admin    ceFAZolin (ANCEF) 2,000 mg in dextrose 5 % 50 mL IVPB (mini-bag)  2,000 mg IntraVENous On Call to [id number] Jared Link MD        0.9 % sodium chloride infusion   IntraVENous Continuous Maryjane Cid MD           Allergies:     Allergies   Allergen Reactions    Fenofibrate Other (See Comments)     Muscle cramps in legs    Ace Inhibitors      Other reaction(s): Cough       Problem List:    Patient Active Problem List   Diagnosis Code    Type 2 diabetes, controlled, with neuropathy ([facility] E11.40    Mixed hyperlipidemia E78.2    Obesity (BMI 35.0-39.9 without comorbidity) E66.9    Essential hypertension I10    Vitamin D deficiency E55.9    CKD (chronic kidney disease) stage 5, GFR less than 15 ml/min (HCC) N18.5    Multinodular goiter E04.2    Controlled type 2 diabetes mellitus with diabetic nephropathy ([facility] E11.21    DM type 2, uncontrolled, with neuropathy [id number]    Uncontrolled type 2 diabetes mellitus with diabetic nephropathy [id number]    Class 2 obesity with body mass index (BMI) of 36.0 to 36.9 in adult E66.9, Z68.36    Class 2 obesity with body mass index (BMI) of 35.0 to 35.9 in adult E66.9, Z68.35    Class 1 obesity with body mass index (BMI) of 34.0 to 34.9 in adult E66.9, Z68.34    Trigger thumb of left hand M65.312    Carpal tunnel syndrome on left G56.02       Past Medical History:        Diagnosis Date    Anemia     Dialysis patient ([facility]     Eye pressure     Hypertension     Kidney failure     Type 2 diabetes mellitus without complication (HCC)        Past Surgical History:        Procedure Laterality Date    COLONOSCOPY         Social History:    Social History     Tobacco Use    Smoking status: Never    Smokeless tobacco: Never Substance Use Topics    Alcohol use: No                                Counseling given: Not Answered      Vital Signs (Current):   Vitals:    11/08/22 1609   Weight: 187 lb (84.8 kg)                                              BP Readings from Last 3 Encounters:   09/20/22 138/78   06/07/22 132/68   09/28/21 128/60       NPO Status:                                                                                 BMI:   Wt Readings from Last 3 Encounters:   11/08/22 187 lb (84.8 kg)   11/01/22 187 lb (84.8 kg)   10/25/22 187 lb (84.8 kg)     Body mass index is 33.13 kg/m². CBC: No results found for: WBC, RBC, HGB, HCT, MCV, RDW, PLT    CMP:   Lab Results   Component Value Date/Time     09/17/2022 07:50 AM    K 5.0 09/17/2022 07:50 AM     09/17/2022 07:50 AM    CO2 25 09/17/2022 07:50 AM    BUN 27 09/17/2022 07:50 AM    CREATININE 4.3 09/17/2022 07:50 AM    GFRAA 9 10/19/2021 09:18 AM    GFRAA 9 10/19/2021 09:18 AM    AGRATIO 1.7 05/15/2020 09:07 AM    LABGLOM 8 10/19/2021 09:18 AM    LABGLOM 8 10/19/2021 09:18 AM    LABGLOM 11 05/15/2020 09:07 AM    GLUCOSE 63 09/17/2022 07:50 AM    PROT 6.5 09/17/2022 07:50 AM    CALCIUM 9.7 09/17/2022 07:50 AM    BILITOT 0.3 09/17/2022 07:50 AM    ALKPHOS 73 09/17/2022 07:50 AM    AST 20 09/17/2022 07:50 AM    ALT 17 09/17/2022 07:50 AM       POC Tests: No results for input(s): POCGLU, POCNA, POCK, POCCL, POCBUN, POCHEMO, POCHCT in the last 72 hours.     Coags: No results found for: PROTIME, INR, APTT    HCG (If Applicable): No results found for: PREGTESTUR, PREGSERUM, HCG, HCGQUANT     ABGs: No results found for: PHART, PO2ART, ZNQ2DQH, GSI0GBL, BEART, M2GGHYYM     Type & Screen (If Applicable):  No results found for: LABABO, LABRH    Drug/Infectious Status (If Applicable):  No results found for: HIV, HEPCAB    COVID-19 Screening (If Applicable): No results found for: COVID19        Anesthesia Evaluation    Airway: Mallampati: II  TM distance: >3 FB   Neck ROM: full  Mouth opening: > = 3 FB   Dental:          Pulmonary:                              Cardiovascular:    (+) hypertension:,         Rhythm: regular  Rate: normal                    Neuro/Psych:   (+) neuromuscular disease:,             GI/Hepatic/Renal:   (+) renal disease:,           Endo/Other:    (+) Diabetes, . Abdominal:             Vascular: Other Findings:           Anesthesia Plan      general     ASA 3       Induction: intravenous. Anesthetic plan and risks discussed with patient. Plan discussed with CRNA.                     Berkley Matos MD   11/10/2022

## 2022-11-10 NOTE — PROGRESS NOTES
Pt awake and alert at this time. Pt on RA, and VSS. Pt denies pain and nausea, tolerating PO. Skin warm , and able to wiggle fingers. Pt meets criteria to be discharged from Phase 1.

## 2022-11-10 NOTE — BRIEF OP NOTE
Brief Postoperative Note      Patient: Susanne Means  YOB: 1955  MRN: [id number]    Date of Procedure: 11/10/2022    Pre-Op Diagnosis: Carpal tunnel syndrome of left wrist [G56.02]  Trigger thumb of left hand [M65.312]    Post-Op Diagnosis: Same       Procedure(s):  LEFT CARPAL TUNNEL RELEASE  LEFT THUMB INTERPHALANGEAL JOINT TRIGGER FINGER RELEASE    Surgeon(s):  Carla Cardenas MD    Assistant: Ling Marrufo CNP    Anesthesia: General    Estimated Blood Loss (mL): Minimal    Complications: None    Specimens:   * No specimens in log *    Implants:  * No implants in log *      Drains: * No LDAs found *    Findings: Same.     Electronically signed by Carla Cardenas MD on 11/10/2022 at 3:27 PM

## 2022-11-10 NOTE — ANESTHESIA POSTPROCEDURE EVALUATION
Department of Anesthesiology  Postprocedure Note    Patient: Roselia Avilez  MRN: [id number]  YOB: 1955  Date of evaluation: 11/10/2022      Procedure Summary     Date: 11/10/22 Room / Location: [address]    Anesthesia Start: 5781 Anesthesia Stop: 3950    Procedures:       LEFT CARPAL TUNNEL RELEASE (Left: Hand)      LEFT THUMB INTERPHALANGEAL JOINT TRIGGER FINGER RELEASE (Left: Hand) Diagnosis:       Carpal tunnel syndrome of left wrist      Trigger thumb of left hand      (Carpal tunnel syndrome of left wrist [G56.02])      (Trigger thumb of left hand [K90.720])    Surgeons: Jennifer Amado MD Responsible Provider: Tiffanie Rees MD    Anesthesia Type: general ASA Status: 3          Anesthesia Type: No value filed.     Fozia Phase I: Fozia Score: 10    Fozia Phase II: Fozia Score: 10      Anesthesia Post Evaluation    Patient location during evaluation: PACU  Level of consciousness: awake  Complications: no  Multimodal analgesia pain management approach

## 2022-11-10 NOTE — PROGRESS NOTES
Discharge instructions review with patient and  Reza Perez. All home medications have been reviewed, pt v/u. Discharge instructions signed. Pt discharged via wheelchair. Pt discharged with all belongings. Reza Perez taking stable pt home.

## 2022-11-10 NOTE — H&P
Preoperative H&P Update    The patient's History and Physical in the medical record from 10/25/2022 was reviewed by me today. Past Medical History:   Diagnosis Date    Anemia     Dialysis patient ([address]     Eye pressure     Hypertension     Kidney failure     Type 2 diabetes mellitus without complication [facility])      Past Surgical History:   Procedure Laterality Date    COLONOSCOPY       No current facility-administered medications on file prior to encounter. Current Outpatient Medications on File Prior to Encounter   Medication Sig Dispense Refill    TRULICITY 9.09 VW/2.2SN SOPN ADMINISTER 0.75 MG UNDER THE SKIN 1 TIME A WEEK 6 mL 0    fenofibrate (TRICOR) 145 MG tablet TAKE 1 TABLET BY MOUTH DAILY 90 tablet 0    CONTOUR NEXT TEST strip 1 each by In Vitro route 4 times daily As needed. 375 strip 3    Insulin Infusion Pump Supplies (MINIMED PUMP RESERVOIR 3ML) MISC Change reservoir q 3 days 9 each 1    IV Sets-Tubing (INFUSION SET 23\") MISC Change infusion set q 3 days (please dispense minimed 630G compatible 23' set) 25 each 1    insulin aspart (NOVOLOG) 100 UNIT/ML injection vial TOTAL DAILY DOSE 60 UNITS  DAILY. USE VIA INSULIN PUMP 30 mL 2    Calcium Acetate, Phos Binder, (CALCIUM ACETATE PO) Take 675 mg by mouth      diphenoxylate-atropine (LOMOTIL) 2.5-0.025 MG per tablet Take 1 tablet by mouth 4 times daily as needed for Diarrhea.  (Patient not taking: Reported on 11/10/2022)      calcitRIOL (ROCALTROL) 0.25 MCG capsule Take 0.25 mcg by mouth daily      hydrALAZINE (APRESOLINE) 50 MG tablet Take 1 tablet by mouth 2 times daily      furosemide (LASIX) 80 MG tablet Take 160 mg by mouth 2 times daily       metoprolol tartrate (LOPRESSOR) 50 MG tablet Take 50 mg by mouth 2 times daily      amLODIPine (NORVASC) 5 MG tablet Take 5 mg by mouth daily      vitamin D (CHOLECALCIFEROL) 5000 units CAPS capsule Take 5,000 Units by mouth daily         Allergies   Allergen Reactions    Fenofibrate Other (See Comments) Muscle cramps in legs    Ace Inhibitors      Other reaction(s): Cough      I reviewed the HPI, medications, allergies, reason for surgery, diagnosis and treatment plan and there has been no change. The patient was evaluated by me today. Physical exam findings for this update include: There were no vitals filed for this visit. Airway is intact  Chest: chest clear, no wheezing, rales, normal symmetric air entry, no tachypnea, retractions or cyanosis  Heart: regular rate and rhythm ; heart sounds normal  Findings on exam of the body region where surgery is to be performed include:  Left carpal tunnel syndrome and trigger thumb.     Electronically signed by Alfonso Dang MD on 11/10/2022 at 6:29 AM

## 2022-11-11 NOTE — OP NOTE
[facility]                     [facility], [address]                                OPERATIVE REPORT    PATIENT NAME: Ana Rosa Benz                    :        1955  MED REC NO:   [id number]                          ROOM:  ACCOUNT NO:   [de-identified]                           ADMIT DATE: 11/10/2022  PROVIDER:     Polina Sheehan MD    DATE OF PROCEDURE:  11/10/2022    PRIMARY CARE PROVIDER:  Sherri Guzman MD    PREOPERATIVE DIAGNOSES:  1. Left wrist carpal tunnel syndrome. 2.  Left trigger thumb at the interphalangeal joint . POSTOPERATIVE DIAGNOSES:  1. Left wrist carpal tunnel syndrome. 2.  Left trigger thumb at the interphalangeal joint . OPERATION PERFORMED:  1. Left wrist median nerve neuroplasty with carpal tunnel release. 2.  Incision of tendon sheath, A2 pulley, left trigger thumb at the IP  joint level. SURGEON:  Polina Sheehan MD    ASSISTANT:  Madhavi Talbot CNP    ANESTHESIA:  General anesthesia. ESTIMATED BLOOD LOSS:  Minimal.    COMPLICATIONS:  None. INDICATIONS:  This is a 54-year-old -American female, right-hand  dominant with left carpal tunnel syndrome as well as trigger thumb about  the IP joint. She has multiple medical comorbidities including  end-stage renal disease, on dialysis and her fistula is on the left  side. All risks, benefits, and alternatives were discussed with the  patient and she agreed to proceed with the surgical treatment. Given the patient's Body mass index is 33.52 kg/m². And done with no tourniquet added significant challenge to the procedure. It required significant physical and mental effort. It required 60% more time for such procedure. OPERATIVE PROCEDURE:  The patient's left hand was marked. She received  2 gm Ancef IV preoperatively. She was then brought to the operating  room and underwent general anesthesia.   We did not use tourniquet  because of the fistula she had on the left side. The left hand and  forearm were then prepped and draped in a regular sterile routine  fashion. A time-out was called confirming the patient name, site, and  the procedure. We started with the trigger thumb. A transverse incision was made over  the volar aspect of the thumb IP joint area. Careful dissection was  performed. We then identified tendon sheath including the A2 pulley  using tenotomy scissors. After releasing the A2 pulley, there was no  triggering anymore. At this point, we irrigated the incision copiously  with normal saline. We then turned our attention towards the carpal tunnel. Both were done  without a tourniquet. An incision was made over the carpal tunnel in  line of the fourth ray. We carefully dissected down exposing deep  fascia into the deep transverse metacarpal ligament. We used a #15  blade to incise the deep metacarpal ligament and then finished the  release with the tenotomy scissors both proximally and distally. There  was good perfusion of the median nerve and there was no other pathology  that could be seen. Hemostasis was secured. We irrigated both  incisions copiously with normal saline. We then closed the skin with a  4-0 nylon. Dressing was then applied with Xeroform, 4x4, sterile  Webril, and Ace wrap. The patient tolerated the procedure well and was taken to the recovery  in stable condition. Suresh Dumont CNP was 1st Assist given the nature of the procedure that needed advanced assistance. She assisted in all aspect of the procedure, including but limited to draping in a sterile fashion, exposure of surgical area, controlling bleeding, retracting and protecting important structures, and surgical wound closure with dressing application. POSTOPERATIVE PLAN:  The patient will be discharged home.   She was  encouraged to start immediate range of motion of the thumb but  nonweightbearing on the wrist for two to three liza.        Jaja Ortega MD    D: 11/10/2022 16:40:32       T: 11/10/2022 21:30:41     [id number]  Job#: [id number]     Doc#: [id number]    CC:  Juan Groves MD

## 2022-11-22 ENCOUNTER — OFFICE VISIT (OUTPATIENT)
Dept: ORTHOPEDIC SURGERY | Age: 67
End: 2022-11-22

## 2022-11-22 VITALS — WEIGHT: 189 LBS | BODY MASS INDEX: 33.49 KG/M2 | HEIGHT: 63 IN

## 2022-11-22 DIAGNOSIS — M65.312 TRIGGER THUMB OF LEFT HAND: Primary | ICD-10-CM

## 2022-11-22 DIAGNOSIS — G56.02 CARPAL TUNNEL SYNDROME, LEFT: ICD-10-CM

## 2022-11-22 PROCEDURE — APPNB30 APP NON BILLABLE TIME 0-30 MINS: Performed by: NURSE PRACTITIONER

## 2022-11-22 PROCEDURE — 99024 POSTOP FOLLOW-UP VISIT: CPT | Performed by: NURSE PRACTITIONER

## 2022-11-22 NOTE — PROGRESS NOTES
DIAGNOSIS:    1-Left wrist medial nerve carpal tunnel release. 2-Left trigger thumb at the IP joint, status post trigger finger release over the A2 pulley    DATE OF SURGERY: 11/10/2022. HISTORY OF PRESENT ILLNESS:  Ms. Minna Mendez 77 y.o.  female who came in today for 2 weeks postoperative visit. The patient denies any significant pain in the left wrist or thumb. She rates her pain a 2/10 VAS. Elías Moreno She has been doing ROM. Improved numbness and pain. No fever or Chills. She reports that she feels like the thumb is triggering but now its more at the base of her thumb. She denies significant pain in her left thumb and states overall it is doing better. Denies smoking. She is on dialysis and has a fistula in the left forearm. PHYSICAL EXAMINATION:  The incision healing well. Sutures intact in the hand and thumb. No signs of any erythema or drainage, minimal swelling. She has no pain with the active or passive range of motion of the left wrist, good ROM. She has intact sensation distally, and she is neurovascularly intact. There is triggering in the left thumb at the A1 pulley. She has nontender to palpation over the left thumb. IMPRESSION:  2 weeks out from  1-Left wrist medial nerve carpal tunnel release. 2-Left trigger thumb at the IP joint, status post trigger finger release over the A2 pulley      PLAN:  I discussed with the patient the findings and that she is no longer triggering of the IP joint, but is triggering of the A1 pulley. I discussed with her options of a cortisone injection or further surgery, but at this point she would like to avoid any further surgery or cortisone injection. I discussed with her the risk and benefits of suture removal and she agreed to proceed. Tolerated well. She can go back to normal activity with no heavy impact activities for 6 weeks. The patient will come back for a follow up in 3 months or as needed.     The patient understands that there is a chance of medial nerve entrapment recurrence even after surgical release.       Shobha Logan, APRN - CNP

## 2022-12-06 ENCOUNTER — OFFICE VISIT (OUTPATIENT)
Dept: ORTHOPEDIC SURGERY | Age: 67
End: 2022-12-06
Payer: COMMERCIAL

## 2022-12-06 VITALS — BODY MASS INDEX: 33.49 KG/M2 | WEIGHT: 189 LBS | HEIGHT: 63 IN

## 2022-12-06 DIAGNOSIS — M65.312 TRIGGER THUMB OF LEFT HAND: Primary | ICD-10-CM

## 2022-12-06 PROCEDURE — 99214 OFFICE O/P EST MOD 30 MIN: CPT | Performed by: ORTHOPAEDIC SURGERY

## 2022-12-06 PROCEDURE — 1123F ACP DISCUSS/DSCN MKR DOCD: CPT | Performed by: ORTHOPAEDIC SURGERY

## 2022-12-06 NOTE — PROGRESS NOTES
DIAGNOSIS:    1-Left wrist medial nerve carpal tunnel release. 2-Left trigger thumb at the IP joint, status post trigger finger release over the A2 pulley  3-Left trigger thumb release A1 pulley    DATE OF SURGERY: 11/10/2022. HISTORY OF PRESENT ILLNESS:  Ms. Warner Course 77 y.o.  female who came in today for postoperative visit and reports continued triggering at the base and IP joint of the left thumb. She states her numbness tingling and pain in her wrist are much improved after the surgery. She rates her pain a 5/10 VAS in her left thumb. She has been doing ROM. Improved numbness and pain. No fever or Chills. She reports that she feels like the thumb is triggering but now its more at the base of her thumb. Denies smoking. She is on dialysis and has a fistula in the left forearm.       Past Medical History:   Diagnosis Date    Anemia     Dialysis patient ([facility] [address]     Eye pressure     Hypertension     Kidney failure     Type 2 diabetes mellitus without complication [facility])        Past Surgical History:   Procedure Laterality Date    CARPAL TUNNEL RELEASE Left 11/10/2022    LEFT CARPAL TUNNEL RELEASE performed by Camille Harrison MD at [address] [place] [address] Left 11/10/2022    LEFT THUMB INTERPHALANGEAL JOINT TRIGGER FINGER RELEASE performed by Camille Harrison MD at [facility] History    Marital status:      Spouse name: Not on file    Number of children: Not on file    Years of education: Not on file    Highest education level: Not on file   Occupational History    Not on file   Tobacco Use    Smoking status: Never    Smokeless tobacco: Never   Vaping Use    Vaping Use: Never used   Substance and Sexual Activity    Alcohol use: No    Drug use: No    Sexual activity: Never   Other Topics Concern    Not on file   Social History Narrative    Not on file     Social Determinants of Health     Financial Resource Strain: Not on file Food Insecurity: Not on file   Transportation Needs: Not on file   Physical Activity: Not on file   Stress: Not on file   Social Connections: Not on file   Intimate Partner Violence: Not on file   Housing Stability: Not on file       Family History   Problem Relation Age of Onset    Diabetes Mother     Heart Attack Mother     Cancer Father     No Known Problems Daughter     No Known Problems Daughter        Current Outpatient Medications on File Prior to Visit   Medication Sig Dispense Refill    TRULICITY 4.91 MARIETTA/0.0BV SOPN ADMINISTER 0.75 MG UNDER THE SKIN 1 TIME A WEEK 6 mL 0    fenofibrate (TRICOR) 145 MG tablet TAKE 1 TABLET BY MOUTH DAILY 90 tablet 0    CONTOUR NEXT TEST strip 1 each by In Vitro route 4 times daily As needed. 375 strip 3    Insulin Infusion Pump Supplies (MINIMED PUMP RESERVOIR 3ML) MISC Change reservoir q 3 days 9 each 1    IV Sets-Tubing (INFUSION SET 23\") MISC Change infusion set q 3 days (please dispense minimed 630G compatible 23' set) 25 each 1    insulin aspart (NOVOLOG) 100 UNIT/ML injection vial TOTAL DAILY DOSE 60 UNITS  DAILY. USE VIA INSULIN PUMP 30 mL 2    Calcium Acetate, Phos Binder, (CALCIUM ACETATE PO) Take 675 mg by mouth      diphenoxylate-atropine (LOMOTIL) 2.5-0.025 MG per tablet Take 1 tablet by mouth 4 times daily as needed for Diarrhea. (Patient not taking: Reported on 11/10/2022)      calcitRIOL (ROCALTROL) 0.25 MCG capsule Take 0.25 mcg by mouth daily      hydrALAZINE (APRESOLINE) 50 MG tablet Take 1 tablet by mouth 2 times daily      furosemide (LASIX) 80 MG tablet Take 160 mg by mouth 2 times daily       metoprolol tartrate (LOPRESSOR) 50 MG tablet Take 50 mg by mouth 2 times daily      amLODIPine (NORVASC) 5 MG tablet Take 5 mg by mouth daily      vitamin D (CHOLECALCIFEROL) 5000 units CAPS capsule Take 5,000 Units by mouth daily       No current facility-administered medications on file prior to visit.        Pertinent items are noted in HPI  Review of systems reviewed from Patient History Form and available in the patient's chart under the Media tab. No change noted. PHYSICAL EXAMINATION:  Ms. Timmy Bolaños is a very pleasant 77 y.o.  female who presents today in no acute distress, awake, alert, and oriented. She is well dressed, nourished and  groomed. Patient with normal affect. Height is  5' 3\" (1.6 m), weight is 189 lb (85.7 kg), Body mass index is 33.48 kg/m². Resting respiratory rate is 16. The patient walks with no limp. The incision healing well. Sutures intact in the hand and thumb. No signs of any erythema or drainage, minimal swelling. She has no pain with the active or passive range of motion of the left wrist, good ROM. She has intact sensation distally, and she is neurovascularly intact. There is triggering in the left thumb at the A1 and A2 pulley. She has nontender to palpation over the left thumb. IMPRESSION:  4 weeks out from  1-Left wrist medial nerve carpal tunnel release. 2-Left trigger thumb at the IP joint, status post trigger finger release over the A2 pulley  3-Left trigger thumb release A1 pulley    PLAN:  I discussed with the patient the findings and that she is persistent recurrent triggering of the A2 pulley at the IP joint, and is now triggering at the A1 pulley as well. I discussed with her options of a cortisone injection or further surgery, but at this point she would like to proceed with surgical correction. I discussed the risks and benefits of surgery with the patient, including but not limited to infection, bleeding, pain, injury to nerves or blood vessels failure of the surgery and need for additional surgery. All the patient's questions were answered. We discussed an expected post-operative course. she  is understanding of this and wishes to proceed. Plan on surgery Monday under local anesthesia for a left trigger thumb release. She has her dialysis fistula in the left  arm.     The patient understands that there is a chance of medial nerve entrapment recurrence even after surgical release.       Vicky Rivera MD

## 2022-12-07 ENCOUNTER — TELEPHONE (OUTPATIENT)
Dept: ORTHOPEDIC SURGERY | Age: 67
End: 2022-12-07

## 2022-12-12 ENCOUNTER — TELEPHONE (OUTPATIENT)
Dept: ORTHOPEDIC SURGERY | Age: 67
End: 2022-12-12

## 2022-12-12 NOTE — TELEPHONE ENCOUNTER
.Surgery and/or Procedure Scheduling     Contact Name: Betzy Roche Request:  Sx on 12/15/22 Lt Trigger Thumb  Patient Contact Number: [phone]    Patient call and she just has a question she is starting to taken some antibiotics today for 7 days and she just need too know if she need to cancel her sx until she has finished her antibiotics  or can she still have the sx on 12/15/22? She just need a call back regarding this matter. Please Advise.

## 2022-12-12 NOTE — TELEPHONE ENCOUNTER
Attempt to call patient. Straight to . Left detailed message stating the antibiotic should be fine to take up until her surgery. Not to be taken the day of the surgery. Will route to [place] to confirm medications that can and can not be taken. She is back in office tomorrow 12/13.

## 2022-12-12 NOTE — TELEPHONE ENCOUNTER
Attempt to call patient back, no answer. Upon return call, please ask what the patient is taking antibiotics for? Name of antibiotic?

## 2022-12-13 ENCOUNTER — OFFICE VISIT (OUTPATIENT)
Dept: ORTHOPEDIC SURGERY | Age: 67
End: 2022-12-13

## 2022-12-13 VITALS — BODY MASS INDEX: 33.49 KG/M2 | WEIGHT: 189 LBS | HEIGHT: 63 IN

## 2022-12-13 DIAGNOSIS — G56.02 CARPAL TUNNEL SYNDROME, LEFT: ICD-10-CM

## 2022-12-13 DIAGNOSIS — M65.312 TRIGGER THUMB OF LEFT HAND: Primary | ICD-10-CM

## 2022-12-13 PROCEDURE — 99024 POSTOP FOLLOW-UP VISIT: CPT | Performed by: ORTHOPAEDIC SURGERY

## 2022-12-13 NOTE — PROGRESS NOTES
DIAGNOSIS:    1-Left wrist medial nerve carpal tunnel release. 2-Left trigger thumb at the IP joint, status post trigger finger release over the A2 pulley  3-Left trigger thumb release A1 pulley  4-Left thumb wound infection. DATE OF SURGERY: 11/10/2022. HISTORY OF PRESENT ILLNESS:  Ms. Collazo Aw 77 y.o.  female who came in today for postoperative visit and reports continued triggering at the base and IP joint of the left thumb. She states her numbness tingling and pain in her wrist are much improved after the surgery. She rates her pain a 8/10 VAS in her left thumb. She has been doing ROM. Improved numbness and pain. No fever or Chills. She reports that she feels like the thumb is triggering but now its more at the base of her thumb. Denies smoking. She is on dialysis and has a fistula in the left forearm. She started on Keflex for her thumb infection.       Past Medical History:   Diagnosis Date    Anemia     Dialysis patient ([address]     Eye pressure     Hypertension     Kidney failure     Type 2 diabetes mellitus without complication [facility])        Past Surgical History:   Procedure Laterality Date    CARPAL TUNNEL RELEASE Left 11/10/2022    LEFT CARPAL TUNNEL RELEASE performed by Tristan Abdalla MD at [address] [place] [address] Left 11/10/2022    LEFT THUMB INTERPHALANGEAL JOINT TRIGGER FINGER RELEASE performed by Tristan Abdalla MD at [facility] History    Marital status:      Spouse name: Not on file    Number of children: Not on file    Years of education: Not on file    Highest education level: Not on file   Occupational History    Not on file   Tobacco Use    Smoking status: Never    Smokeless tobacco: Never   Vaping Use    Vaping Use: Never used   Substance and Sexual Activity    Alcohol use: No    Drug use: No    Sexual activity: Never   Other Topics Concern    Not on file   Social History Narrative    Not on file Social Determinants of Health     Financial Resource Strain: Not on file   Food Insecurity: Not on file   Transportation Needs: Not on file   Physical Activity: Not on file   Stress: Not on file   Social Connections: Not on file   Intimate Partner Violence: Not on file   Housing Stability: Not on file       Family History   Problem Relation Age of Onset    Diabetes Mother     Heart Attack Mother     Cancer Father     No Known Problems Daughter     No Known Problems Daughter        Current Outpatient Medications on File Prior to Visit   Medication Sig Dispense Refill    TRULICITY 8.79 BK/8.4UL SOPN ADMINISTER 0.75 MG UNDER THE SKIN 1 TIME A WEEK 6 mL 0    fenofibrate (TRICOR) 145 MG tablet TAKE 1 TABLET BY MOUTH DAILY 90 tablet 0    CONTOUR NEXT TEST strip 1 each by In Vitro route 4 times daily As needed. 375 strip 3    Insulin Infusion Pump Supplies (MINIMED PUMP RESERVOIR 3ML) MISC Change reservoir q 3 days 9 each 1    IV Sets-Tubing (INFUSION SET 23\") MISC Change infusion set q 3 days (please dispense minimed 630G compatible 23' set) 25 each 1    insulin aspart (NOVOLOG) 100 UNIT/ML injection vial TOTAL DAILY DOSE 60 UNITS  DAILY. USE VIA INSULIN PUMP 30 mL 2    Calcium Acetate, Phos Binder, (CALCIUM ACETATE PO) Take 675 mg by mouth      diphenoxylate-atropine (LOMOTIL) 2.5-0.025 MG per tablet Take 1 tablet by mouth 4 times daily as needed for Diarrhea.  (Patient not taking: Reported on 11/10/2022)      calcitRIOL (ROCALTROL) 0.25 MCG capsule Take 0.25 mcg by mouth daily      hydrALAZINE (APRESOLINE) 50 MG tablet Take 1 tablet by mouth 2 times daily      furosemide (LASIX) 80 MG tablet Take 160 mg by mouth 2 times daily       metoprolol tartrate (LOPRESSOR) 50 MG tablet Take 50 mg by mouth 2 times daily      amLODIPine (NORVASC) 5 MG tablet Take 5 mg by mouth daily      vitamin D (CHOLECALCIFEROL) 5000 units CAPS capsule Take 5,000 Units by mouth daily       No current facility-administered medications on file prior to visit. Pertinent items are noted in HPI  Review of systems reviewed from Patient History Form and available in the patient's chart under the Media tab. No change noted. PHYSICAL EXAMINATION:  Ms. Sonia Young is a very pleasant 77 y.o.  female who presents today in no acute distress, awake, alert, and oriented. She is well dressed, nourished and  groomed. Patient with normal affect. Height is  5' 3\" (1.6 m), weight is 189 lb (85.7 kg), Body mass index is 33.48 kg/m². Resting respiratory rate is 16. The patient walks with no limp. The incision healing well CTS. Left thumb with signs of infection drainage, done with 18-G needle and pus evacuated, minimal swelling. She has no pain with the active or passive range of motion of the left wrist, good ROM. She has intact sensation distally, and she is neurovascularly intact. There is triggering in the left thumb at the A1 and A2 pulley. She has nontender to palpation over the left thumb. IMPRESSION:  4 weeks out from  1-Left wrist medial nerve carpal tunnel release. 2-Left trigger thumb at the IP joint, status post trigger finger release over the A2 pulley  3-Left trigger thumb release A1 pulley  4-Left thumb wound infection. PLAN:  I discussed with the patient the findings and that she is persistent recurrent triggering of the A2 pulley at the IP joint, and is now triggering at the A1 pulley as well. She has infection and we recommend PO ABX, no surgery at this point.  F/U in 2 weeks      Clara Marr MD

## 2022-12-15 NOTE — TELEPHONE ENCOUNTER
Auth: NPR  Date: 12/15/22  Reference # None  Spoke with: Julia  Type of SX: Outpatient  Location: [facility]  CPT: 55618   DX: [phone]  SX area: [facility]  Insurance: [employer] [facility] [place]

## 2022-12-29 ENCOUNTER — OFFICE VISIT (OUTPATIENT)
Dept: ORTHOPEDIC SURGERY | Age: 67
End: 2022-12-29
Payer: COMMERCIAL

## 2022-12-29 VITALS — HEIGHT: 63 IN | BODY MASS INDEX: 33.49 KG/M2 | WEIGHT: 189 LBS

## 2022-12-29 DIAGNOSIS — M65.312 TRIGGER THUMB OF LEFT HAND: Primary | ICD-10-CM

## 2022-12-29 DIAGNOSIS — T81.49XA POSTOPERATIVE WOUND INFECTION: ICD-10-CM

## 2022-12-29 PROCEDURE — 99213 OFFICE O/P EST LOW 20 MIN: CPT | Performed by: NURSE PRACTITIONER

## 2022-12-29 PROCEDURE — 1123F ACP DISCUSS/DSCN MKR DOCD: CPT | Performed by: NURSE PRACTITIONER

## 2022-12-29 NOTE — PROGRESS NOTES
DIAGNOSIS:    1-Left wrist medial nerve carpal tunnel release. 2-Left trigger thumb at the IP joint, status post trigger finger release over the A2 pulley  3-Left trigger thumb release A1 pulley  4-Left thumb wound infection. DATE OF SURGERY: 11/10/2022. HISTORY OF PRESENT ILLNESS:  Comfort Montez 77 y. o.  female who came in today for postoperative visit and reports continued triggering at the base and IP joint of the left thumb. She was also found to have a hand incision infection that her left thumb and has completed Keflex and reports good improvement in her wound. She states her numbness tingling and pain in her wrist are much improved after the surgery. She rates her pain a 3/10 VAS in her left thumb. She has been doing ROM. Improved numbness and pain. No fever or Chills. She reports that she feels like the thumb is triggering but now its more at the base of her thumb. Denies smoking. She is on dialysis and has a fistula in the left forearm.        Past Medical History:   Diagnosis Date    Anemia     Dialysis patient ([address]     Eye pressure     Hypertension     Kidney failure     Type 2 diabetes mellitus without complication [facility])      Past Surgical History:   Procedure Laterality Date    CARPAL TUNNEL RELEASE Left 11/10/2022    LEFT CARPAL TUNNEL RELEASE performed by Neymar Davis MD at [address] [facility] Left 11/10/2022    LEFT THUMB INTERPHALANGEAL JOINT TRIGGER FINGER RELEASE performed by Neymar Davis MD at [address]      Family History   Problem Relation Age of Onset    Diabetes Mother     Heart Attack Mother     Cancer Father     No Known Problems Daughter     No Known Problems Daughter      Social History     Socioeconomic History    Marital status:      Spouse name: Not on file    Number of children: Not on file    Years of education: Not on file    Highest education level: Not on file   Occupational History    Not on file Tobacco Use    Smoking status: Never    Smokeless tobacco: Never   Vaping Use    Vaping Use: Never used   Substance and Sexual Activity    Alcohol use: No    Drug use: No    Sexual activity: Never   Other Topics Concern    Not on file   Social History Narrative    Not on file     Social Determinants of Health     Financial Resource Strain: Not on file   Food Insecurity: Not on file   Transportation Needs: Not on file   Physical Activity: Not on file   Stress: Not on file   Social Connections: Not on file   Intimate Partner Violence: Not on file   Housing Stability: Not on file     Current Outpatient Medications   Medication Sig Dispense Refill    TRULICITY 4.37 ZE/5.1MG SOPN ADMINISTER 0.75 MG UNDER THE SKIN 1 TIME A WEEK 6 mL 0    fenofibrate (TRICOR) 145 MG tablet TAKE 1 TABLET BY MOUTH DAILY 90 tablet 0    CONTOUR NEXT TEST strip 1 each by In Vitro route 4 times daily As needed. 375 strip 3    Insulin Infusion Pump Supplies (MINIMED PUMP RESERVOIR 3ML) MISC Change reservoir q 3 days 9 each 1    IV Sets-Tubing (INFUSION SET 23\") MISC Change infusion set q 3 days (please dispense minimed 630G compatible 23' set) 25 each 1    insulin aspart (NOVOLOG) 100 UNIT/ML injection vial TOTAL DAILY DOSE 60 UNITS  DAILY. USE VIA INSULIN PUMP 30 mL 2    Calcium Acetate, Phos Binder, (CALCIUM ACETATE PO) Take 675 mg by mouth      diphenoxylate-atropine (LOMOTIL) 2.5-0.025 MG per tablet Take 1 tablet by mouth 4 times daily as needed for Diarrhea.  (Patient not taking: Reported on 11/10/2022)      calcitRIOL (ROCALTROL) 0.25 MCG capsule Take 0.25 mcg by mouth daily      hydrALAZINE (APRESOLINE) 50 MG tablet Take 1 tablet by mouth 2 times daily      furosemide (LASIX) 80 MG tablet Take 160 mg by mouth 2 times daily       metoprolol tartrate (LOPRESSOR) 50 MG tablet Take 50 mg by mouth 2 times daily      amLODIPine (NORVASC) 5 MG tablet Take 5 mg by mouth daily      vitamin D (CHOLECALCIFEROL) 5000 units CAPS capsule Take 5,000 Units by mouth daily       No current facility-administered medications for this visit. Pertinent items are noted in HPI  Review of systems reviewed from Patient History Form and available in the patient's chart under the Media tab. No change noted. PHYSICAL EXAMINATION:  Ms. Shirley Wilson is a very pleasant 77 y.o.  female who presents today in no acute distress, awake, alert, and oriented. She is well dressed, nourished and  groomed. Patient with normal affect. Height is  5' 3\" (1.6 m), weight is 189 lb (85.7 kg), Body mass index is 33.48 kg/m². Resting respiratory rate is 16. The patient walks with no limp. The incision healing well CTS. Left thumb with peeling skin near the IP joint, minimal swelling. No drainage or erythema. She has no pain with the active or passive range of motion of the left wrist, good ROM. She has intact sensation distally, and she is neurovascularly intact. There is triggering in the left thumb at the A1 and A2 pulley. She has nontender to palpation over the left thumb. IMPRESSION:  6 weeks out from  1-Left wrist medial nerve carpal tunnel release. 2-Left trigger thumb at the IP joint, status post trigger finger release over the A2 pulley  3-Left trigger thumb release A1 pulley  4-Left thumb wound infection-improving. PLAN:  I discussed with the patient the findings and that she is persistent recurrent triggering of the A2 pulley at the IP joint, and is now triggering at the A1 pulley as well. The infection is improved with PO ABX. Would recommend holding off on surgery at this point. F/U in 4-6 weeks or sooner if worsens.     Hazel Best, APRN - CNP

## 2023-01-08 LAB
ALBUMIN/GLOBULIN RATIO: 1.8 RATIO (ref 0.8–2.6)
ALBUMIN: 4.1 G/DL (ref 3.5–5.2)
ALP BLD-CCNC: 127 U/L (ref 23–144)
ALT SERPL-CCNC: 13 U/L (ref 0–60)
AST SERPL-CCNC: 11 U/L (ref 0–55)
BILIRUB SERPL-MCNC: 0.3 MG/DL (ref 0–1.2)
BUN BLDV-MCNC: 23 MG/DL (ref 3–29)
BUN/CREAT BLD: 5 (ref 7–25)
CALCIUM SERPL-MCNC: 9.9 MG/DL (ref 8.5–10.5)
CHLORIDE BLD-SCNC: 98 MEQ/L (ref 96–110)
CHOLESTEROL: 227 MG/DL
CO2: 26 MEQ/L (ref 19–32)
CREAT SERPL-MCNC: 4.5 MG/DL (ref 0.5–1.2)
GLOBULIN: 2.3 G/DL (ref 1.9–3.6)
GLOMERULAR FILTRATION RATE: 10 MLS/MIN/1.73M2
GLUCOSE BLD-MCNC: 173 MG/DL (ref 70–99)
HBA1C MFR BLD: 7.6 % (ref 4–6)
HDLC SERPL-MCNC: 33 MG/DL
LDL CHOLESTEROL CALCULATED: 126 MG/DL
POTASSIUM SERPL-SCNC: 4.7 MEQ/L (ref 3.4–5.3)
SODIUM BLD-SCNC: 136 MEQ/L (ref 135–148)
STATUS: ABNORMAL
T4 FREE: 1.11 NG/DL (ref 0.8–1.8)
TOTAL PROTEIN: 6.4 G/DL (ref 6–8.3)
TRIGL SERPL-MCNC: 340 MG/DL
TSH SERPL DL<=0.05 MIU/L-ACNC: 0.9 MCIU/ML (ref 0.4–4.5)
VITAMIN D 25-HYDROXY: 43 NG/ML (ref 30–100)
VLDLC SERPL CALC-MCNC: 68 MG/DL (ref 4–38)

## 2023-01-10 ENCOUNTER — OFFICE VISIT (OUTPATIENT)
Dept: ENDOCRINOLOGY | Age: 68
End: 2023-01-10
Payer: COMMERCIAL

## 2023-01-10 VITALS
WEIGHT: 191 LBS | OXYGEN SATURATION: 99 % | RESPIRATION RATE: 14 BRPM | SYSTOLIC BLOOD PRESSURE: 132 MMHG | DIASTOLIC BLOOD PRESSURE: 68 MMHG | TEMPERATURE: 98 F | HEIGHT: 63 IN | BODY MASS INDEX: 33.84 KG/M2 | HEART RATE: 77 BPM

## 2023-01-10 DIAGNOSIS — E11.21 DIABETIC NEPHROPATHY ASSOCIATED WITH TYPE 2 DIABETES MELLITUS (HCC): ICD-10-CM

## 2023-01-10 DIAGNOSIS — I10 ESSENTIAL HYPERTENSION: ICD-10-CM

## 2023-01-10 DIAGNOSIS — E78.2 MIXED HYPERLIPIDEMIA: ICD-10-CM

## 2023-01-10 DIAGNOSIS — E11.65 POORLY CONTROLLED TYPE 2 DIABETES MELLITUS WITH NEUROPATHY (HCC): Primary | ICD-10-CM

## 2023-01-10 DIAGNOSIS — E04.2 MULTINODULAR GOITER: ICD-10-CM

## 2023-01-10 DIAGNOSIS — E66.9 CLASS 1 OBESITY WITH SERIOUS COMORBIDITY AND BODY MASS INDEX (BMI) OF 34.0 TO 34.9 IN ADULT, UNSPECIFIED OBESITY TYPE: ICD-10-CM

## 2023-01-10 DIAGNOSIS — E11.40 POORLY CONTROLLED TYPE 2 DIABETES MELLITUS WITH NEUROPATHY (HCC): Primary | ICD-10-CM

## 2023-01-10 DIAGNOSIS — N18.5 CKD (CHRONIC KIDNEY DISEASE) STAGE 5, GFR LESS THAN 15 ML/MIN (HCC): ICD-10-CM

## 2023-01-10 DIAGNOSIS — E55.9 VITAMIN D DEFICIENCY: ICD-10-CM

## 2023-01-10 PROCEDURE — 3078F DIAST BP <80 MM HG: CPT | Performed by: INTERNAL MEDICINE

## 2023-01-10 PROCEDURE — 3051F HG A1C>EQUAL 7.0%<8.0%: CPT | Performed by: INTERNAL MEDICINE

## 2023-01-10 PROCEDURE — 1123F ACP DISCUSS/DSCN MKR DOCD: CPT | Performed by: INTERNAL MEDICINE

## 2023-01-10 PROCEDURE — 3075F SYST BP GE 130 - 139MM HG: CPT | Performed by: INTERNAL MEDICINE

## 2023-01-10 PROCEDURE — 99215 OFFICE O/P EST HI 40 MIN: CPT | Performed by: INTERNAL MEDICINE

## 2023-01-10 RX ORDER — INSULIN ASPART 100 [IU]/ML
INJECTION, SOLUTION INTRAVENOUS; SUBCUTANEOUS
Qty: 60 ML | Refills: 3 | Status: SHIPPED | OUTPATIENT
Start: 2023-01-10

## 2023-01-10 RX ORDER — DULAGLUTIDE 0.75 MG/.5ML
INJECTION, SOLUTION SUBCUTANEOUS
Qty: 6 ML | Refills: 1 | Status: SHIPPED | OUTPATIENT
Start: 2023-01-10

## 2023-01-10 RX ORDER — FENOFIBRATE 145 MG/1
145 TABLET, COATED ORAL DAILY
Qty: 90 TABLET | Refills: 1 | Status: SHIPPED | OUTPATIENT
Start: 2023-01-10

## 2023-01-10 NOTE — PROGRESS NOTES
Gerardo Yang is a 79 y.o. female who presents for Type 2 diabetes mellitus. Current symptoms/problems include  hyperglycemia  and show no change. 1.  Poorly controlled type 2 diabetes mellitus with neuropathy ([facility] [address] [E11.40, E11.65]    Diagnosed with Type 2 diabetes mellitus in 1968. Comorbid conditions:  hyperlipidemia, CKD and Neuropathy     Current diabetic medications include: Novolog, Trulicity     Intolerance to diabetes medications: No     Weight trend: stable  Prior visit with dietician: yes  Current diet: on average, 3 meals per day  Current exercise: active     Current monitoring regimen: home blood tests - 4 times daily   Has brought blood glucose log/meter:  Yes  Home blood sugar records: fasting range: 60-90 and postprandial range:   Any episodes of hypoglycemia? Rare  Hypoglycemia frequency and time(s):  twice a month  Does patient have Glucagon emergency kit? No  Does patient have rapid acting carbohydrate? Yes  Does patient wear a medic alert bracelet or necklace? No     2. Mixed hyperlipidemia  Has muscle pain. 3. Obesity   Eats healthy. 4. Essential hypertension  No headaches. 5. Vitamin D deficiency  No bone pain. 6. CKD (chronic kidney disease) stage 5  No urination problems. 7. MNG  No difficulty swallowing, voice change, history of radiation, thyroid cancer in family. 8. Nephropathy  Urinates 5-6 times a day      ULTRASOUND THYROID, 2/21/2020 12:06 PM.       INDICATION: Thyroid nodule. Goiter. COMPARISON: Ultrasound thyroid, 10/5/2017. TECHNIQUE: Multiple transverse and longitudinal gray scale images were obtained through the    thyroid gland. FINDINGS:        Right lobe measures 2.2 x 2.5 x 5.3 cm. Left lobe measures 2.0 x 2.6 x 5.3 cm. Isthmus is 13 mm    in thickness. Thyroid gland is heterogeneous in echotexture throughout. There is    redemonstration of multiple nodules within the bilateral lobes, numbering at least 5. Largest nodule is located inferiorly within the left lobe and is TI-RADS Category 4. This    measures 1.9 x 2.1 x 2.4 cm compared to 1.2 x 1.7 x 2.2 cm previously. This lesion is    heterogeneous, is wider than tall, and has regions of hypoechoic and hyperechoic components. No    evidence of cystic change or microcalcifications. Largest nodule in the right lobe measures 1.6 x 2.3 x 2.6 cm. This nodule is TI-RADS Category    4, wider than tall, and mildly heterogeneous with a few foci of microcalcification. This nodule    previously measured 1.2 x 1.7 x 2.2 cm. No definite new nodule. IMPRESSION:        BILATERAL TI-RADS CATEGORY 4 LESIONS SHOWING MILD INCREASE IN SIZE AS COMPARED TO PREVIOUS    STUDY 2017. CORRELATION WITH SHORT INTERVAL FOLLOW-UP OR FINE-NEEDLE ASPIRATION MAY BE HELPFUL    FOR FURTHER EVALUATION. PATIENT HAS PREVIOUSLY UNDERGONE BIOPSY OF MULTIPLE NODULES IN . Danielle Aguila M.D. Workstation [email or web address]:[id number]         CYTOLOGY REPORT  ______________________________________________________________________  Patient Name: Sebastian John    Accession #:        Location: [address] [place] [address] #:  [id number]  Account #:    [id number]  Soc. Sec. #:         : 1955 (Age: 61)   Gender: F  Physician(s): Madonna Schwartz M.D. Collected: 3/1/2016  Received:  3/1/2016  ______________________________________________________________________  Source of Specimen(s)   of 4. Thyroid, Fine Needle , Right Upper    Clinical History  Pertinent Clinical History:   Other Clinical Conditions:    Clinical Diagnosis:  Thyroid Nodules    ______________________________________________________________________   FINAL CYTOLGIC DIAGNOSIS    1 of 4. Thyroid, Fine Needle , Right Upper:       Satisfactory for evaluation. Benign.   Colloid nodule.               ______________________________________________________________________  Cytotechnologist: DL    Electronically Signed   Cinthya Newton. Suzanna William MD  Pathologist  3/2/2016      Procedures/Addenda  FNA Immediate Specimen Evaluation     Date Ordered:     3/1/2016      Status: Signed Out        Date Complete:     3/1/2016     By: Bassem Blind       Date Reported:     3/2/2016         Preliminary Diagnosis  - Thyroid epithelial cells and colloid sufficient for evaluation. Results-Comments             Cinthya Newton. Suzanna William MD        Gross Description    Received in [facility] 28. 30ml red, cloudy fluid. 8 prepared slides 4 airdried, 4 fixed    ______________________________________________________________________                                   CPT Code(s): 1: Z5705278, 36965, [id number])           CYTOLOGY REPORT  ______________________________________________________________________  Patient Name: Lyudmila Blue    Accession #:        Location: [address] [place] [address] #:  [id number]  Account #:    [id number]  Soc. Sec. #:         : 1955 (Age: 61)   Gender: F  Physician(s): Miquel Carmichael M.D. Collected: 3/1/2016  Received:  3/1/2016  ______________________________________________________________________  Source of Specimen(s)  2 of 4. Thyroid Fine Needle Aspiration, Right Lower    Clinical History  Pertinent Clinical History:   Other Clinical Conditions:    Clinical Diagnosis:  Thyroid nodules         ______________________________________________________________________   FINAL CYTOLGIC DIAGNOSIS    2 of 4. Thyroid Fine Needle Aspiration, Right Lower:       Satisfactory for evaluation. Benign. Adenomatoid or colloid nodule. Chronic lymphocytic thyroiditis.               ______________________________________________________________________  Cytotechnologist: DL    Electronically Signed   Cinthya Newton.  Suzanna William MD  Pathologist  3/2/2016      Procedures/Addenda  FNA Immediate Specimen Evaluation     Date Ordered:     3/1/2016      Status: Signed Out        Date Complete:     3/1/2016 By: Molly Robbins       Date Reported:     3/2/2016         Preliminary Diagnosis  - Thyroid epithelial cells and colloid sufficient for evaluation. Results-Comments             Alfonso Lantigua. Douglas Bauer MD        Gross Description  Received in [facility] 28. 30ml red, cloudy fluid. 8 prepared slides 4 airdried, 4 fixed    ______________________________________________________________________                                   CPT Code(s): 1: B2753931, 83833, [id number])           CYTOLOGY REPORT  ______________________________________________________________________  Patient Name: Julius Biswas    Accession #:        Location: [address] [place] [address]. #:  [id number]  Account #:    [id number]  Soc. Sec. #:         : 1955 (Age: 61)   Gender: F  Physician(s): Griselda Hua M.D. Collected: 3/1/2016  Received:  3/1/2016  ______________________________________________________________________  Source of Specimen(s)  3 of 4. Thyroid, Fine Needle Aspiration, Isthmus    Clinical History  Pertinent Clinical History:   Other Clinical Conditions:    Clinical Diagnosis:  Thyroid nodules    ______________________________________________________________________   FINAL CYTOLGIC DIAGNOSIS    3 of 4. Thyroid, Fine Needle Aspiration, Isthmus:       Satisfactory for evaluation. Benign. Colloid nodule.                 ______________________________________________________________________  Cytotechnologist: DL    Electronically Signed   Alfonso Lantigua. Douglas Bauer MD  Pathologist  3/2/2016      Procedures/Addenda  FNA Immediate Specimen Evaluation     Date Ordered:     3/1/2016      Status: Signed Out        Date Complete:     3/1/2016     By: Molly Robbins       Date Reported:     3/2/2016         Preliminary Diagnosis  - Thyroid epithelial cells and colloid sufficient for evaluation. Results-Comments             Alfonso Lantigua. Douglas Bauer MD        Gross Description  Received in . [facility] 28. 30ml red, cloudy fluid.   8 prepared slides 4 airdried, 4 fixed    ______________________________________________________________________                                   CPT Code(s): 1: D3780407, H5238794, [id number])           CYTOLOGY REPORT  ______________________________________________________________________  Patient Name: Julius Biswas    Accession #:        Location: [address] [place] [address] #:  [id number]  Account #:    [id number]  Soc. Sec. #:         : 1955 (Age: 61)   Gender: F  Physician(s): Griselda Hua M.D. Collected: 3/1/2016  Received:  3/1/2016  ______________________________________________________________________  Source of Specimen(s)  . Thyroid, Fine Needle Aspiration, Left mid    Clinical History  Pertinent Clinical History:   Other Clinical Conditions:    Clinical Diagnosis:  Thyroid nodules    ______________________________________________________________________   FINAL CYTOLGIC DIAGNOSIS     4. Thyroid, Fine Needle Aspiration, Left mid:       Satisfactory for evaluation. Benign. Colloid nodule. .               ______________________________________________________________________  Cytotechnologist: DL    Electronically Signed   Alfonso Lantigua. Douglas Bauer MD  Pathologist  3/2/2016      Procedures/Addenda  FNA Immediate Specimen Evaluation     Date Ordered:     3/1/2016      Status: Signed Out        Date Complete:     3/1/2016     By: Molly Robbins       Date Reported:     3/2/2016         Preliminary Diagnosis  - Thyroid epithelial cells and colloid sufficient for evaluation. Results-Comments             Alfonso Lantigua. Douglas Bauer MD        Gross Description  Received in [facility] 28. 30ml red, cloudy fluid.   8 prepared slides 4 airdried, 4 fixed    ______________________________________________________________________                                   CPT Code(s): 1: S0773662, 05949, [id number])           Lab Results   Component Value Date    LABA1C 7.6 (H) 2023     No results found for: EAG      IMPRESSION:    1. Slight asymmetric enlargement of the right lobe of the thyroid gland as compared to the left lobe of the thyroid gland. There is a heterogeneous echotexture demonstrated in each lobe of the thyroid gland as well as along the isthmus. 2.  There are 3 nodules demonstrated in the right lobe of the thyroid gland which have not substantially changed in size compared to the previous ultrasound study. An additional nodule in the left lobe of the thyroid gland and along the isthmus have also not substantially changed in size. Workstation [employer]   Result Narrative   STUDY:  Ultrasound examination of the thyroid gland. CLINICAL:   Follow-up of thyroid gland nodules    COMPARISON:   2/1/2016    TECHNIQUE:   Ultrasound examination of the thyroid gland is performed with grayscale and with color Doppler. FINDINGS:     The right lobe of the thyroid gland measures 6.0 x 2.0 x 2.9 cm. On the previous ultrasound study the right lobe measured 5.3 x 2.2 x 2.8 cm. There is a diffusely heterogeneous echotexture in the right lobe of the thyroid gland. There is a well-defined hypoechoic nodule along the upper portion of the right lobe measuring 0.6 x 0.9 x 0.5 cm. There is a solid nodule in the midportion of the right lobe measuring 2.6 x 2.1 x 2.5 cm. This has slightly ill-defined margins. Inferiorly there is an additional solid nodule measuring 1.8 x 2.2 x 1.2 cm which may have a small amount of calcification along the wall. On the previous ultrasound study, the largest nodule in the midportion of the right lobe measured up to 2.8 cm and the nodule in the lower pole measured up to 2.4 cm indicating no substantial interval change in size. The left lobe of the thyroid gland currently measures 4.9 x 2.3 x 2.3 cm. On the previous ultrasound study the left lobe measured 5.4 x 2.8 x 2.6 cm. There is a heterogeneous echotexture in the left lobe of the thyroid gland.  There is a solid nodule in the mid to lower portion of the left lobe measuring 1.7 x 2.8 x 1.5 cm. On the previous study this measured up to 2.9 cm indicating no substantial interval change. The isthmus measures 6 mm in thickness. There is a nodule along the anterior aspect of the isthmus measuring 1.0 x 0.8 x 0.8 cm. On the previous ultrasound this measured up to 1 cm indicating no significant interval change.          Past Medical History:   Diagnosis Date    Anemia     Dialysis patient ([address]     Eye pressure     Hypertension     Kidney failure     Type 2 diabetes mellitus without complication [facility])       Patient Active Problem List   Diagnosis    Type 2 diabetes, controlled, with neuropathy ([address]    Mixed hyperlipidemia    Obesity (BMI 35.0-39.9 without comorbidity)    Essential hypertension    Vitamin D deficiency    CKD (chronic kidney disease) stage 5, GFR less than 15 ml/min ([facility])    Multinodular goiter    Controlled type 2 diabetes mellitus with diabetic nephropathy ([address]    Uncontrolled type 2 diabetes mellitus with diabetic nephropathy    Class 2 obesity with body mass index (BMI) of 36.0 to 36.9 in adult    Class 2 obesity with body mass index (BMI) of 35.0 to 35.9 in adult    Class 1 obesity with body mass index (BMI) of 34.0 to 34.9 in adult    Trigger thumb of left hand    Carpal tunnel syndrome on left    Poorly controlled type 2 diabetes mellitus with neuropathy ([address]    Diabetic nephropathy associated with type 2 diabetes mellitus ([address]     Past Surgical History:   Procedure Laterality Date    CARPAL TUNNEL RELEASE Left 11/10/2022    LEFT CARPAL TUNNEL RELEASE performed by Katherine Grissom MD at [address] [facility] Left 11/10/2022    LEFT THUMB INTERPHALANGEAL JOINT TRIGGER FINGER RELEASE performed by Katherine Grissom MD at [address] History     Socioeconomic History    Marital status:      Spouse name: Not on file    Number of children: Not on file    Years of education: Not on file    Highest education level: Not on file   Occupational History    Not on file   Tobacco Use    Smoking status: Never    Smokeless tobacco: Never   Vaping Use    Vaping Use: Never used   Substance and Sexual Activity    Alcohol use: No    Drug use: No    Sexual activity: Never   Other Topics Concern    Not on file   Social History Narrative    Not on file     Social Determinants of Health     Financial Resource Strain: Not on file   Food Insecurity: Not on file   Transportation Needs: Not on file   Physical Activity: Not on file   Stress: Not on file   Social Connections: Not on file   Intimate Partner Violence: Not on file   Housing Stability: Not on file     Family History   Problem Relation Age of Onset    Diabetes Mother     Heart Attack Mother     Cancer Father     No Known Problems Daughter     No Known Problems Daughter      Current Outpatient Medications   Medication Sig Dispense Refill    TRULICITY 9.27 RS/5.0CG SOPN ADMINISTER 0.75 MG UNDER THE SKIN 1 TIME A WEEK 6 mL 0    fenofibrate (TRICOR) 145 MG tablet TAKE 1 TABLET BY MOUTH DAILY 90 tablet 0    CONTOUR NEXT TEST strip 1 each by In Vitro route 4 times daily As needed. 375 strip 3    Insulin Infusion Pump Supplies (MINIMED PUMP RESERVOIR 3ML) MISC Change reservoir q 3 days 9 each 1    IV Sets-Tubing (INFUSION SET 23\") MISC Change infusion set q 3 days (please dispense minimed 630G compatible 23' set) 25 each 1    insulin aspart (NOVOLOG) 100 UNIT/ML injection vial TOTAL DAILY DOSE 60 UNITS  DAILY.  USE VIA INSULIN PUMP 30 mL 2    Calcium Acetate, Phos Binder, (CALCIUM ACETATE PO) Take 675 mg by mouth      calcitRIOL (ROCALTROL) 0.25 MCG capsule Take 0.25 mcg by mouth daily      hydrALAZINE (APRESOLINE) 50 MG tablet Take 1 tablet by mouth 2 times daily      furosemide (LASIX) 80 MG tablet Take 160 mg by mouth 2 times daily       metoprolol tartrate (LOPRESSOR) 50 MG tablet Take 50 mg by mouth 2 times daily      amLODIPine (NORVASC) 5 MG tablet Take 5 mg by mouth daily      vitamin D (CHOLECALCIFEROL) 5000 units CAPS capsule Take 5,000 Units by mouth daily      diphenoxylate-atropine (LOMOTIL) 2.5-0.025 MG per tablet Take 1 tablet by mouth 4 times daily as needed for Diarrhea. (Patient not taking: No sig reported)       No current facility-administered medications for this visit.      Allergies   Allergen Reactions    Fenofibrate Other (See Comments)     Muscle cramps in legs    Ace Inhibitors      Other reaction(s): Cough     Family Status   Relation Name Status    Mother      Father      Anastasiia  Alive    Anastasiia  Alive       Review of Systems  Constitutional: no fatigue, no fever, no recent weight gain, no recent weight loss, no changes in appetite  Eyes: no eye pain, no change in vision, no eye redness, no eye irritation, no double vision  Ears, nose, throat: no nasal congestion, no sore throat, no earache, no decrease in hearing, no hoarseness, no dry mouth, no sinus problems, no difficulty swallowing, no neck lumps, no dental problems, no mouth sores, no ringing in ears  Pulmonary: no shortness of breath, no wheezing, no dyspnea on exertion, no cough  Cardiovascular: no chest pain, no lower extremity edema, no orthopnea, no intermittent leg claudication, no palpitations  Gastrointestinal: no abdominal pain, no nausea, no vomiting, no diarrhea, no constipation, no dysphagia, no heartburn, no bloating  Genitourinary: no dysuria, no urinary incontinence, no urinary hesitancy, no urinary frequency, no feelings of urinary urgency, no nocturia  Musculoskeletal: no joint swelling, no joint stiffness, has joint pain, no muscle cramps, no muscle pain, no bone pain  Integument/Breast: no hair loss, no skin rashes, no skin lesions, no itching, no dry skin  Neurological: no numbness, no tingling, no weakness, no confusion, no headaches, no dizziness, no fainting, no tremors, no decrease in memory, has balance problems  Psychiatric: no anxiety, no depression, no insomnia  Hematologic/Lymphatic: no tendency for easy bleeding, no swollen lymph nodes, no tendency for easy bruising  Immunology: no seasonal allergies, no frequent infections, no frequent illnesses  Endocrine: no temperature intolerance     OBJECTIVE:  /68   Pulse 77   Temp 98 °F (36.7 °C)   Resp 14   Ht 5' 3\" (1.6 m)   Wt 191 lb (86.6 kg)   SpO2 99%   BMI 33.83 kg/m²      Wt Readings from Last 3 Encounters:   01/10/23 191 lb (86.6 kg)   12/29/22 189 lb (85.7 kg)   12/13/22 189 lb (85.7 kg)         Constitutional: no acute distress, well appearing and well nourished  Psychiatric: oriented to person, place and time, judgement and insight and normal, recent and remote memory and intact and mood and affect are normal  Skin: skin and subcutaneous tissue is normal without mass, normal turgor  Head and Face: examination of head and face revealed no abnormalities  Eyes: no lid or conjunctival swelling, erythema or discharge, pupils are normal, equal, round, reactive to light  Ears/Nose: external inspection of ears and nose revealed no abnormalities, hearing is grossly normal  Oropharynx/Mouth/Face: lips, tongue and gums are normal with no lesions, the voice quality was normal  Neck: neck is supple and symmetric, with midline trachea and no masses, thyroid is normal  Lymphatics: normal cervical lymph nodes, normal supraclavicular nodes  Pulmonary: no increased work of breathing or signs of respiratory distress, lungs are clear to auscultation  Cardiovascular: normal heart rate and rhythm, normal S1 and S2, no murmurs and pedal pulses and 2+ bilaterally, no edema  Abdomen: abdomen is soft, non-tender with no masses  Musculoskeletal: normal gait and station and exam of the digits and nails are normal  Neurological: normal coordination and normal general cortical function    Visual inspection:  Deformity/amputation: present hammer toes  Skin lesions/pre-ulcerative calluses: absent  Edema: right- no, left- no Sensory exam:  Monofilament sensation: normal  (minimum of 5 random plantar locations tested, avoiding callused areas - > 1 area with absence of sensation is + for neuropathy)     Plus at least one of the following:  Pulses: normal  Proprioception: Intact  Vibration (128 Hz): Impaired    ASSESSMENT/PLAN:    1. Type 2 diabetes mellitus, poorly controlled, with neuropathy (HCC)  Worsening  Improved low BG  Recommend to upgrade insulin pump for diabetes management  Checks BG 4 times daily for 60 days  Average , SD 53  Hemoglobin A1c 6.6-6.3-6.7-8.2-6.7-6.0-5.5-7.6  Continue pump settings. Novolog in pump  Continue Trulicity  - Comprehensive Metabolic Panel; Future  - Hemoglobin A1C; Future     2. Mixed hyperlipidemia  Worsening  Fenofibrate 145 mg daily  LDL cholesterol 97-26--59-  HDL 33  Triglycerides [phone]  Had muscle pain, leg cramping on statins  Stopped Pravastatin 20 mg daily. Was having stomach pain. Refuses statins  - Lipid Panel; Future     3. Obesity   Diet. 4. Essential hypertension  Current meds. 5. Vitamin D deficiency  25 hydroxy vitamin D 86-81-92-51-48-60-43  Vitamin D 25 Hydroxy; Future  Vitamin D 5000 IU weekly    6. CKD (chronic kidney disease) stage 5  Creatinine 4.4-4.3  GFR 10-11  Nephrology follow up. On HD      7. MNG  Thyroid sonogram  Call for results  TSH 2.0-1.0-0.7-1.23-1.22-0.895  6/21/2022  I recommended FNA of thyroid 3.8 cm and left 2.9 cm nodules. Again, patient refused. She understands risk of cancer. Patient is aware about thyroid biopsy order is in the chart. Just needs to schedule biopsy when she is ready. 7/6/2021  I recommended FNA of the right 1.9 cm, 2.5cm, 2.6 cm nodules  Refused another biopsy, prefers monitoring. Understands risk of thyroid cancer and thyroid nodules  - Thyroid sonogram     8.  Nephropathy  Follow with Nephrology  Microalbumin creatinine ratio 192      Reviewed and/or ordered clinical lab results Yes  Reviewed and/or ordered radiology tests No  Reviewed and/or ordered other diagnostic tests No  Discussed test results with performing physician No  Independently reviewed image, tracing, or specimen Yes Insulin pump tracing, 7 pages of data. Recommendations and assessment and plan  Made a decision to obtain old records Yes  Reviewed old records No  Obtained history from other than patient No    Comfort Montez was counseled regarding symptoms of diabetes diagnosis, course and complications of disease if inadequately treated, side effects of medications, diagnosis, treatment options, and prognosis, risks, benefits, complications, and alternatives of treatment, labs, imaging and other studies and treatment targets and goals, thyroid cancer risk, insulin pump data, settings, arrangements. She understands instructions and counseling. Total time I spent for this encounter 40 minutes    Return in about 3 months (around 4/10/2023) for diabetes.

## 2023-01-31 ENCOUNTER — OFFICE VISIT (OUTPATIENT)
Dept: ORTHOPEDIC SURGERY | Age: 68
End: 2023-01-31
Payer: COMMERCIAL

## 2023-01-31 VITALS — WEIGHT: 191 LBS | BODY MASS INDEX: 33.84 KG/M2 | HEIGHT: 63 IN

## 2023-01-31 DIAGNOSIS — M65.312 TRIGGER THUMB OF LEFT HAND: Primary | ICD-10-CM

## 2023-01-31 DIAGNOSIS — M65.312 TRIGGER FINGER OF LEFT THUMB: Primary | ICD-10-CM

## 2023-01-31 PROCEDURE — 99214 OFFICE O/P EST MOD 30 MIN: CPT | Performed by: ORTHOPAEDIC SURGERY

## 2023-01-31 PROCEDURE — 1123F ACP DISCUSS/DSCN MKR DOCD: CPT | Performed by: ORTHOPAEDIC SURGERY

## 2023-01-31 NOTE — PROGRESS NOTES
DIAGNOSIS:    1-Left wrist medial nerve carpal tunnel release. 2-Left trigger thumb at the IP joint, status post trigger finger release over the A2 pulley  3-Left trigger thumb release A1 pulley  4-Left thumb wound infection, healed. DATE OF SURGERY: 11/10/2022. HISTORY OF PRESENT ILLNESS:  Ms. Minna Mendez 79 y.o.  female who came in today for f/u of triggering of the left thumb. She states her numbness tingling and pain in her wrist are much improved after the surgery. She rates her pain a 8/10 VAS in her left thumb. She has been doing ROM. Improved numbness and pain. No fever or Chills. She reports that she feels like the thumb is triggering but now its more at the base of her thumb. Denies smoking. She is on dialysis and has a fistula in the left forearm. She finished Keflex for her thumb infection, healed well.       Past Medical History:   Diagnosis Date    Anemia     Dialysis patient ([address]     Eye pressure     Hypertension     Kidney failure     Type 2 diabetes mellitus without complication [facility])        Past Surgical History:   Procedure Laterality Date    CARPAL TUNNEL RELEASE Left 11/10/2022    LEFT CARPAL TUNNEL RELEASE performed by Louise Kuhn MD at [address] [place] [address] Left 11/10/2022    LEFT THUMB INTERPHALANGEAL JOINT TRIGGER FINGER RELEASE performed by Louise Kuhn MD at [facility] History    Marital status:      Spouse name: Not on file    Number of children: Not on file    Years of education: Not on file    Highest education level: Not on file   Occupational History    Not on file   Tobacco Use    Smoking status: Never    Smokeless tobacco: Never   Vaping Use    Vaping Use: Never used   Substance and Sexual Activity    Alcohol use: No    Drug use: No    Sexual activity: Never   Other Topics Concern    Not on file   Social History Narrative    Not on file     Social Determinants of Health Financial Resource Strain: Not on file   Food Insecurity: Not on file   Transportation Needs: Not on file   Physical Activity: Not on file   Stress: Not on file   Social Connections: Not on file   Intimate Partner Violence: Not on file   Housing Stability: Not on file       Family History   Problem Relation Age of Onset    Diabetes Mother     Heart Attack Mother     Cancer Father     No Known Problems Daughter     No Known Problems Daughter        Current Outpatient Medications on File Prior to Visit   Medication Sig Dispense Refill    insulin aspart (NOVOLOG) 100 UNIT/ML injection vial TOTAL DAILY DOSE 60 UNITS DAILY. USE VIA INSULIN PUMP 60 mL 3    fenofibrate (TRICOR) 145 MG tablet Take 1 tablet by mouth daily 90 tablet 1    Dulaglutide (TRULICITY) 4.61 OR/5.4WE SOPN ADMINISTER 0.75 MG UNDER THE SKIN 1 TIME A WEEK 6 mL 1    CONTOUR NEXT TEST strip 1 each by In Vitro route 4 times daily As needed. 375 strip 3    Insulin Infusion Pump Supplies (MINIMED PUMP RESERVOIR 3ML) MISC Change reservoir q 3 days 9 each 1    IV Sets-Tubing (INFUSION SET 23\") MISC Change infusion set q 3 days (please dispense minimed 630G compatible 23' set) 25 each 1    insulin aspart (NOVOLOG) 100 UNIT/ML injection vial TOTAL DAILY DOSE 60 UNITS  DAILY. USE VIA INSULIN PUMP 30 mL 2    Calcium Acetate, Phos Binder, (CALCIUM ACETATE PO) Take 675 mg by mouth      diphenoxylate-atropine (LOMOTIL) 2.5-0.025 MG per tablet Take 1 tablet by mouth 4 times daily as needed for Diarrhea.  (Patient not taking: No sig reported)      calcitRIOL (ROCALTROL) 0.25 MCG capsule Take 0.25 mcg by mouth daily      hydrALAZINE (APRESOLINE) 50 MG tablet Take 1 tablet by mouth 2 times daily      furosemide (LASIX) 80 MG tablet Take 160 mg by mouth 2 times daily       metoprolol tartrate (LOPRESSOR) 50 MG tablet Take 50 mg by mouth 2 times daily      amLODIPine (NORVASC) 5 MG tablet Take 5 mg by mouth daily      vitamin D (CHOLECALCIFEROL) 5000 units CAPS capsule Take 5,000 Units by mouth daily       No current facility-administered medications on file prior to visit. Pertinent items are noted in HPI  Review of systems reviewed from Patient History Form and available in the patient's chart under the Media tab. No change noted. PHYSICAL EXAMINATION:  Ms. Laurie Ayala is a very pleasant 79 y.o.  female who presents today in no acute distress, awake, alert, and oriented. She is well dressed, nourished and  groomed. Patient with normal affect. Height is  5' 3\" (1.6 m), weight is 191 lb (86.6 kg), Body mass index is 33.83 kg/m². Resting respiratory rate is 16. The patient walks with no limp. The incision healing well CTS. Left thumb with no drainage, minimal swelling, wound healed well. She has no pain with the active or passive range of motion of the left wrist, good ROM. She has intact sensation distally, and she is neurovascularly intact. There is triggering in the left thumb at the A1 pulley. She is tender to palpation over the left thumb base. IMPRESSION:     1-Left wrist medial nerve carpal tunnel release. 2-Left trigger thumb at the IP joint, status post trigger finger release over the A2 pulley  3-Left trigger thumb release A1 pulley  4-Left thumb wound infection, healed. PLAN:  I discussed with the patient the findings and that she is persistent recurrent triggering of the A1 pulley left thumb. She had infection and healed well with PO ABX, I believe she will benefit from left trigger thumb release  pulley. I discussed the risks and benefits of surgery with the patient, including but not limited to infection, bleeding, pain, injury to nerves or blood vessels failure of the surgery and need for additional surgery. All the patient's questions were answered. We discussed an expected post-operative course. She  is understanding of this and wishes to proceed.       Valentina Serrano MD

## 2023-02-01 ENCOUNTER — TELEPHONE (OUTPATIENT)
Dept: ORTHOPEDIC SURGERY | Age: 68
End: 2023-02-01

## 2023-02-10 NOTE — TELEPHONE ENCOUNTER
Auth: # [id number]    Date: 02/09/23 thru 04/09/23  Type of SX:  Outpatient  Location: [facility]  CPT: 50441   DX Code: B47.740  SX area: [facility]  Insurance: Lockfer[employer]aga

## 2023-02-16 ENCOUNTER — TELEPHONE (OUTPATIENT)
Dept: ENDOCRINOLOGY | Age: 68
End: 2023-02-16

## 2023-02-16 ENCOUNTER — TELEPHONE (OUTPATIENT)
Dept: ORTHOPEDIC SURGERY | Age: 68
End: 2023-02-16

## 2023-02-16 NOTE — TELEPHONE ENCOUNTER
Call from [facility] with [employer] stated that a PA is needed for Rx Onetouch test strips for 2 boxes     Key code - [id number]    Please advise

## 2023-02-20 NOTE — PROGRESS NOTES
Name_______________________________________Printed:____________________  Date and time of surgery_2/23/2023_______0830________________Arrival Time:_______0700_________   1. The instructions given regarding when and if a patient needs to stop oral intake prior to surgery varies. Follow the specific instructions you were given                  _x__Nothing to eat or to drink after Midnight the night before.                   ____Carbo loading or instructions will be given to select patients-if you have been given those instructions -please do the following                           The evening before your surgery after dinner before midnight drink 40 ounces of gatorade. If you are diabetic use sugar free. The morning of surgery drink 40 ounces of water. This needs to be finished 3 hours prior to your surgery start time. 2. Take the following pills with a small sip of water on the morning of surgery___metoprolol  hydralizine________________________________________________                  Do not take blood pressure medications ending in pril or sartan the joe prior to surgery or the morning of surgery. Dr Ricardo Wilder patient are not to take any medications the AM of surgery. 3. Aspirin, Ibuprofen, Advil, Naproxen, Vitamin E and other Anti-inflammatory products and supplements should be stopped for 5 -7days before surgery or as directed by your physician. 4. Check with your Doctor regarding stopping Plavix, Coumadin,Eliquis, Lovenox,Effient,Pradaxa,Xarelto, Fragmin or other blood thinners and follow their instructions. 5. Do not smoke, and do not drink any alcoholic beverages 24 hours prior to surgery. This includes NA Beer. Refrain from the usage of any recreational drugs. 6. You may brush your teeth and gargle the morning of surgery. DO NOT SWALLOW WATER   7. You MUST make arrangements for a responsible adult to stay on site while you are here and take you home after your surgery.  You will not be allowed to leave alone or drive yourself home. It is strongly suggested someone stay with you the first 24 hrs. Your surgery will be cancelled if you do not have a ride home. 8. A parent/legal guardian must accompany a child scheduled for surgery and plan to stay at the hospital until the child is discharged. Please do not bring other children with you. 9. Please wear simple, loose fitting clothing to the hospital.  Jose Barbour not bring valuables (money, credit cards, checkbooks, etc.) Do not wear any makeup (including no eye makeup) or nail polish on your fingers or toes. 10. DO NOT wear any jewelry or piercings on day of surgery. All body piercing jewelry must be removed. 11. If you have ___dentures, they will be removed before going to the OR; we will provide you a container. If you wear ___contact lenses or ___glasses, they will be removed; please bring a case for them. 12. Please see your family doctor/pediatrician for a history & physical and/or concerning medications. Bring any test results/reports from your physician's office. PCP__________________Phone___________H&P Appt. Date________             13 If you  have a Living Will and Durable Power of  for Healthcare, please bring in a copy. 15. Notify your Surgeon if you develop any illness between now and surgery  time, cough, cold, fever, sore throat, nausea, vomiting, etc.  Please notify your surgeon if you experience dizziness, shortness of breath or blurred vision between now & the time of your surgery             15. DO NOT shave your operative site 96 hours prior to surgery. For face & neck surgery, men may use an electric razor 48 hours prior to surgery. 16. Shower the night before or morning of surgery using an antibacterial soap or as you have been instructed. 17. To provide excellent care visitors will be limited to one in the room at any given time.              18.  Please bring picture ID and insurance card. 19.  Visit our web site for additional information:  [email or web address]              20.During flu season no children under the age of 15 are permitted in the hospital for the safety of all patients. 21. If you take a long acting insulin in the evening only  take half of your usual  dose the night  before your procedure              22. If you use a c-pap please bring DOS if staying overnight,             23.For your convenience [facility] has a pharmacy on site to fill your prescriptions. 24. If you use oxygen and have a portable tank please bring it  with you the DOS             25. Bring a complete list of all your medications with name and dose include any supplements. 26. Other__________________________________________   *Please call pre admission testing if you any further questions   [address] [facility]  [phone]   [address] [place] [address]       VISITOR POLICY(subject to change)    Current policy is 2 visitors per patient. No children. Mask is  at the discretion of the facility. Visiting hours are 8a-8p. Overnight visitors will be at the discretion of the nurse. All policies subject to change. All above information reviewed with patient in person or by phone. Patient verbalizes understanding. All questions and concerns addressed.                                                                                                  Patient/Rep__patient__________________                                                                                                                                    PRE OP INSTRUCTIONS

## 2023-02-23 ENCOUNTER — ANESTHESIA EVENT (OUTPATIENT)
Dept: OPERATING ROOM | Age: 68
End: 2023-02-23
Payer: COMMERCIAL

## 2023-02-23 ENCOUNTER — HOSPITAL ENCOUNTER (OUTPATIENT)
Age: 68
Setting detail: OUTPATIENT SURGERY
Discharge: HOME | End: 2023-02-23
Attending: ORTHOPAEDIC SURGERY | Admitting: ORTHOPAEDIC SURGERY
Payer: COMMERCIAL

## 2023-02-23 ENCOUNTER — ANESTHESIA (OUTPATIENT)
Dept: OPERATING ROOM | Age: 68
End: 2023-02-23
Payer: COMMERCIAL

## 2023-02-23 VITALS
TEMPERATURE: 97.6 F | HEIGHT: 63 IN | DIASTOLIC BLOOD PRESSURE: 65 MMHG | WEIGHT: 187 LBS | HEART RATE: 77 BPM | OXYGEN SATURATION: 94 % | SYSTOLIC BLOOD PRESSURE: 117 MMHG | RESPIRATION RATE: 12 BRPM | BODY MASS INDEX: 33.13 KG/M2

## 2023-02-23 DIAGNOSIS — M65.312 TRIGGER THUMB OF LEFT HAND: Primary | ICD-10-CM

## 2023-02-23 LAB
ANION GAP SERPL CALCULATED.3IONS-SCNC: 11 MMOL/L (ref 3–16)
BUN BLDV-MCNC: 40 MG/DL (ref 7–20)
CALCIUM SERPL-MCNC: 10 MG/DL (ref 8.3–10.6)
CHLORIDE BLD-SCNC: 99 MMOL/L (ref 99–110)
CO2: 25 MMOL/L (ref 21–32)
CREAT SERPL-MCNC: 5.8 MG/DL (ref 0.6–1.2)
GFR SERPL CREATININE-BSD FRML MDRD: 7 ML/MIN/{1.73_M2}
GLUCOSE BLD-MCNC: 131 MG/DL (ref 70–99)
GLUCOSE BLD-MCNC: 159 MG/DL (ref 70–99)
GLUCOSE BLD-MCNC: 173 MG/DL (ref 70–99)
PERFORMED ON: ABNORMAL
PERFORMED ON: ABNORMAL
POTASSIUM SERPL-SCNC: 5 MMOL/L (ref 3.5–5.1)
SODIUM BLD-SCNC: 135 MMOL/L (ref 136–145)

## 2023-02-23 PROCEDURE — 2500000003 HC RX 250 WO HCPCS: Performed by: REGISTERED NURSE

## 2023-02-23 PROCEDURE — 2709999900 HC NON-CHARGEABLE SUPPLY: Performed by: ORTHOPAEDIC SURGERY

## 2023-02-23 PROCEDURE — 80048 BASIC METABOLIC PNL TOTAL CA: CPT

## 2023-02-23 PROCEDURE — 3600000003 HC SURGERY LEVEL 3 BASE: Performed by: ORTHOPAEDIC SURGERY

## 2023-02-23 PROCEDURE — 36415 COLL VENOUS BLD VENIPUNCTURE: CPT

## 2023-02-23 PROCEDURE — 2580000003 HC RX 258: Performed by: ANESTHESIOLOGY

## 2023-02-23 PROCEDURE — 6360000002 HC RX W HCPCS: Performed by: ORTHOPAEDIC SURGERY

## 2023-02-23 PROCEDURE — 3700000001 HC ADD 15 MINUTES (ANESTHESIA): Performed by: ORTHOPAEDIC SURGERY

## 2023-02-23 PROCEDURE — 7100000001 HC PACU RECOVERY - ADDTL 15 MIN: Performed by: ORTHOPAEDIC SURGERY

## 2023-02-23 PROCEDURE — 2580000003 HC RX 258: Performed by: ORTHOPAEDIC SURGERY

## 2023-02-23 PROCEDURE — 7100000010 HC PHASE II RECOVERY - FIRST 15 MIN: Performed by: ORTHOPAEDIC SURGERY

## 2023-02-23 PROCEDURE — 7100000011 HC PHASE II RECOVERY - ADDTL 15 MIN: Performed by: ORTHOPAEDIC SURGERY

## 2023-02-23 PROCEDURE — 2500000003 HC RX 250 WO HCPCS: Performed by: ORTHOPAEDIC SURGERY

## 2023-02-23 PROCEDURE — 7100000000 HC PACU RECOVERY - FIRST 15 MIN: Performed by: ORTHOPAEDIC SURGERY

## 2023-02-23 PROCEDURE — 6360000002 HC RX W HCPCS: Performed by: REGISTERED NURSE

## 2023-02-23 PROCEDURE — 3700000000 HC ANESTHESIA ATTENDED CARE: Performed by: ORTHOPAEDIC SURGERY

## 2023-02-23 PROCEDURE — A4217 STERILE WATER/SALINE, 500 ML: HCPCS | Performed by: ORTHOPAEDIC SURGERY

## 2023-02-23 PROCEDURE — 3600000013 HC SURGERY LEVEL 3 ADDTL 15MIN: Performed by: ORTHOPAEDIC SURGERY

## 2023-02-23 RX ORDER — PROPOFOL 10 MG/ML
INJECTION, EMULSION INTRAVENOUS PRN
Status: DISCONTINUED | OUTPATIENT
Start: 2023-02-23 | End: 2023-02-23 | Stop reason: SDUPTHER

## 2023-02-23 RX ORDER — SODIUM CHLORIDE 0.9 % (FLUSH) 0.9 %
5-40 SYRINGE (ML) INJECTION EVERY 12 HOURS SCHEDULED
Status: CANCELLED | OUTPATIENT
Start: 2023-02-23

## 2023-02-23 RX ORDER — FENTANYL CITRATE 50 UG/ML
25 INJECTION, SOLUTION INTRAMUSCULAR; INTRAVENOUS EVERY 5 MIN PRN
Status: CANCELLED | OUTPATIENT
Start: 2023-02-23

## 2023-02-23 RX ORDER — ONDANSETRON 2 MG/ML
INJECTION INTRAMUSCULAR; INTRAVENOUS PRN
Status: DISCONTINUED | OUTPATIENT
Start: 2023-02-23 | End: 2023-02-23 | Stop reason: SDUPTHER

## 2023-02-23 RX ORDER — SODIUM CHLORIDE 9 MG/ML
25 INJECTION, SOLUTION INTRAVENOUS PRN
Status: CANCELLED | OUTPATIENT
Start: 2023-02-23

## 2023-02-23 RX ORDER — SODIUM CHLORIDE 9 MG/ML
INJECTION, SOLUTION INTRAVENOUS PRN
Status: CANCELLED | OUTPATIENT
Start: 2023-02-23

## 2023-02-23 RX ORDER — HALOPERIDOL 5 MG/ML
1 INJECTION INTRAMUSCULAR
Status: CANCELLED | OUTPATIENT
Start: 2023-02-23 | End: 2023-02-24

## 2023-02-23 RX ORDER — DEXAMETHASONE SODIUM PHOSPHATE 4 MG/ML
INJECTION, SOLUTION INTRA-ARTICULAR; INTRALESIONAL; INTRAMUSCULAR; INTRAVENOUS; SOFT TISSUE PRN
Status: DISCONTINUED | OUTPATIENT
Start: 2023-02-23 | End: 2023-02-23 | Stop reason: SDUPTHER

## 2023-02-23 RX ORDER — HYDROMORPHONE HCL 110MG/55ML
0.5 PATIENT CONTROLLED ANALGESIA SYRINGE INTRAVENOUS EVERY 5 MIN PRN
Status: CANCELLED | OUTPATIENT
Start: 2023-02-23

## 2023-02-23 RX ORDER — DIPHENHYDRAMINE HYDROCHLORIDE 50 MG/ML
12.5 INJECTION INTRAMUSCULAR; INTRAVENOUS
Status: CANCELLED | OUTPATIENT
Start: 2023-02-23 | End: 2023-02-24

## 2023-02-23 RX ORDER — HYDRALAZINE HYDROCHLORIDE 20 MG/ML
10 INJECTION INTRAMUSCULAR; INTRAVENOUS
Status: CANCELLED | OUTPATIENT
Start: 2023-02-23

## 2023-02-23 RX ORDER — CLINDAMYCIN HYDROCHLORIDE 300 MG/1
300 CAPSULE ORAL 2 TIMES DAILY
Qty: 14 CAPSULE | Refills: 0 | Status: SHIPPED | OUTPATIENT
Start: 2023-02-23 | End: 2023-03-02

## 2023-02-23 RX ORDER — SODIUM CHLORIDE 9 MG/ML
INJECTION, SOLUTION INTRAVENOUS CONTINUOUS
Status: DISCONTINUED | OUTPATIENT
Start: 2023-02-23 | End: 2023-02-23 | Stop reason: HOSPADM

## 2023-02-23 RX ORDER — BUPIVACAINE HYDROCHLORIDE 5 MG/ML
INJECTION, SOLUTION EPIDURAL; INTRACAUDAL
Status: COMPLETED | OUTPATIENT
Start: 2023-02-23 | End: 2023-02-23

## 2023-02-23 RX ORDER — OXYCODONE HYDROCHLORIDE 5 MG/1
5 TABLET ORAL
Status: CANCELLED | OUTPATIENT
Start: 2023-02-23 | End: 2023-02-24

## 2023-02-23 RX ORDER — ONDANSETRON 2 MG/ML
4 INJECTION INTRAMUSCULAR; INTRAVENOUS
Status: CANCELLED | OUTPATIENT
Start: 2023-02-23 | End: 2023-02-24

## 2023-02-23 RX ORDER — SODIUM CHLORIDE 0.9 % (FLUSH) 0.9 %
5-40 SYRINGE (ML) INJECTION PRN
Status: CANCELLED | OUTPATIENT
Start: 2023-02-23

## 2023-02-23 RX ORDER — LABETALOL HYDROCHLORIDE 5 MG/ML
10 INJECTION, SOLUTION INTRAVENOUS
Status: CANCELLED | OUTPATIENT
Start: 2023-02-23

## 2023-02-23 RX ORDER — MIDAZOLAM HYDROCHLORIDE 2 MG/2ML
2 INJECTION, SOLUTION INTRAMUSCULAR; INTRAVENOUS
Status: CANCELLED | OUTPATIENT
Start: 2023-02-23 | End: 2023-02-24

## 2023-02-23 RX ORDER — LIDOCAINE HYDROCHLORIDE 20 MG/ML
INJECTION, SOLUTION EPIDURAL; INFILTRATION; INTRACAUDAL; PERINEURAL PRN
Status: DISCONTINUED | OUTPATIENT
Start: 2023-02-23 | End: 2023-02-23 | Stop reason: SDUPTHER

## 2023-02-23 RX ORDER — TRAMADOL HYDROCHLORIDE 50 MG/1
50 TABLET ORAL EVERY 6 HOURS PRN
Qty: 12 TABLET | Refills: 0 | Status: SHIPPED | OUTPATIENT
Start: 2023-02-23 | End: 2023-02-26

## 2023-02-23 RX ORDER — FENTANYL CITRATE 50 UG/ML
INJECTION, SOLUTION INTRAMUSCULAR; INTRAVENOUS PRN
Status: DISCONTINUED | OUTPATIENT
Start: 2023-02-23 | End: 2023-02-23 | Stop reason: SDUPTHER

## 2023-02-23 RX ORDER — GABAPENTIN 300 MG/1
CAPSULE ORAL
COMMUNITY
Start: 2023-02-11

## 2023-02-23 RX ADMIN — ONDANSETRON 4 MG: 2 INJECTION INTRAMUSCULAR; INTRAVENOUS at 09:41

## 2023-02-23 RX ADMIN — CEFAZOLIN 2000 MG: 2 INJECTION, POWDER, FOR SOLUTION INTRAMUSCULAR; INTRAVENOUS at 09:12

## 2023-02-23 RX ADMIN — FENTANYL CITRATE 50 MCG: 50 INJECTION, SOLUTION INTRAMUSCULAR; INTRAVENOUS at 09:19

## 2023-02-23 RX ADMIN — SODIUM CHLORIDE: 9 INJECTION, SOLUTION INTRAVENOUS at 09:13

## 2023-02-23 RX ADMIN — DEXAMETHASONE SODIUM PHOSPHATE 4 MG: 4 INJECTION, SOLUTION INTRAMUSCULAR; INTRAVENOUS at 09:23

## 2023-02-23 RX ADMIN — PROPOFOL 120 MG: 10 INJECTION, EMULSION INTRAVENOUS at 09:19

## 2023-02-23 RX ADMIN — FENTANYL CITRATE 50 MCG: 50 INJECTION, SOLUTION INTRAMUSCULAR; INTRAVENOUS at 09:29

## 2023-02-23 RX ADMIN — LIDOCAINE HYDROCHLORIDE 80 MG: 20 INJECTION, SOLUTION EPIDURAL; INFILTRATION; INTRACAUDAL; PERINEURAL at 09:19

## 2023-02-23 ASSESSMENT — PAIN - FUNCTIONAL ASSESSMENT: PAIN_FUNCTIONAL_ASSESSMENT: NONE - DENIES PAIN

## 2023-02-23 NOTE — BRIEF OP NOTE
Brief Postoperative Note      Patient: Roselia Avilez  YOB: 1955  MRN: [id number]    Date of Procedure: 2/23/2023    Pre-Op Diagnosis: Trigger finger of left thumb [M65.312]    Post-Op Diagnosis: Same       Procedure(s):  LEFT THUMB A1 AND A2 PULLEY TRIGGER FINGER RELEASE    Surgeon(s):  Jennifer Amado MD    Assistant: Ivania Lewis CNP    Anesthesia: General    Estimated Blood Loss (mL): Minimal    Complications: None    Specimens:   * No specimens in log *    Implants:  * No implants in log *      Drains: * No LDAs found *    Findings: Same.     Electronically signed by Jennifer Amado MD on 2/23/2023 at 3:23 PM

## 2023-02-23 NOTE — ANESTHESIA POSTPROCEDURE EVALUATION
Department of Anesthesiology  Postprocedure Note    Patient: Melonie Barajas  MRN: [id number]  YOB: 1955  Date of evaluation: 2/23/2023      Procedure Summary     Date: 02/23/23 Room / Location: [address]    Anesthesia Start: 8208 Anesthesia Stop: 7714    Procedure: LEFT THUMB A1 AND A2 PULLEY TRIGGER FINGER RELEASE (Left: Hand) Diagnosis:       Trigger finger of left thumb      (Trigger finger of left thumb [M65.312])    Surgeons: Ria Mejia MD Responsible Provider: Armond Leon MD    Anesthesia Type: general ASA Status: 3          Anesthesia Type: No value filed.     Fozia Phase I: Fozia Score: 10    Fozia Phase II: Fozia Score: 10      Anesthesia Post Evaluation    Patient location during evaluation: PACU  Patient participation: complete - patient participated  Level of consciousness: awake and alert  Airway patency: patent  Nausea & Vomiting: no nausea and no vomiting  Complications: no  Cardiovascular status: blood pressure returned to baseline  Respiratory status: acceptable  Hydration status: euvolemic  Multimodal analgesia pain management approach

## 2023-02-23 NOTE — DISCHARGE INSTRUCTIONS
Post op instruction:  1- D/C home  2- Dx Right thumb and long (middle) finger pain/ trigger thumb and ling finger. 3- NWB right hand  4- Elevation surgical site, with ice  5- Keep Drsg dry and clean, 3 days, then BandAid. 6- F/U in 2 weeks. Alfonso Dang MD, 2/23/2023      GENERAL SURGERY DISCHARGE INSTRUCTIONS    Follow your surgeons instructions. Follow up with your surgeon as directed. Observe the operative area for signs of excessive bleeding. If needed apply pressure,elevate if able and contact your surgeon. Observe the operative site for any signs of infection- such as increased pain,redness,fever greater than 101 degrees,swelling, foul odor or drainage. Contact your surgeon if any of these symptoms are present. Keep operative site clean and dry. Do not remove dressing unless instructed to by surgeon. Apply ice as directed. Avoid pulling,pushing or tugging to suture line. If you become short of breath call your doctor or go to the ER. Take medications as directed. Pain medication should be taken with food. Do not drive or operate machinery while taking narcotics. For any problems or question call your surgeon. ANESTHESIA DISCHARGE INSTRUCTIONS    Wear your seatbelt home. You are under the influence of drugs-do not drink alcohol, drive, operate machinery, make any important decisions or sign any legal documents for 24 hours. A responsible adult needs to be with you for 24 hours. You may experience lightheadedness, dizziness, or sleepiness following surgery. Rest at home today- increase activity as tolerated. Progress slowly to a regular diet unless your physician has instructed you otherwise. Drink plenty of water. If persistent nausea and vomiting becomes a problem, call your physician. Coughing, sore throat and muscle aches are other side effects of anesthesia, and should improve with time. Do not drive or operate machinery while taking narcotics.

## 2023-02-23 NOTE — H&P
Preoperative H&P Update    The patient's History and Physical in the medical record from 1/31/2023 was reviewed by me today. Past Medical History:   Diagnosis Date    Anemia     Dialysis patient ([address]     Eye pressure     Hypertension     Kidney failure     Type 2 diabetes mellitus without complication [facility])      Past Surgical History:   Procedure Laterality Date    CARPAL TUNNEL RELEASE Left 11/10/2022    LEFT CARPAL TUNNEL RELEASE performed by Aaron Sol MD at [facility] Left 11/10/2022    LEFT THUMB INTERPHALANGEAL JOINT TRIGGER FINGER RELEASE performed by Aaron Sol MD at [facility]     No current facility-administered medications on file prior to encounter. Current Outpatient Medications on File Prior to Encounter   Medication Sig Dispense Refill    gabapentin (NEURONTIN) 300 MG capsule TAKE 1 CAPSULE BY MOUTH DAILY      insulin aspart (NOVOLOG) 100 UNIT/ML injection vial TOTAL DAILY DOSE 60 UNITS DAILY. USE VIA INSULIN PUMP 60 mL 3    fenofibrate (TRICOR) 145 MG tablet Take 1 tablet by mouth daily 90 tablet 1    Dulaglutide (TRULICITY) 3.40 IT/3.1YC SOPN ADMINISTER 0.75 MG UNDER THE SKIN 1 TIME A WEEK 6 mL 1    CONTOUR NEXT TEST strip 1 each by In Vitro route 4 times daily As needed. 375 strip 3    Insulin Infusion Pump Supplies (MINIMED PUMP RESERVOIR 3ML) MISC Change reservoir q 3 days 9 each 1    IV Sets-Tubing (INFUSION SET 23\") MISC Change infusion set q 3 days (please dispense minimed 630G compatible 23' set) 25 each 1    insulin aspart (NOVOLOG) 100 UNIT/ML injection vial TOTAL DAILY DOSE 60 UNITS  DAILY. USE VIA INSULIN PUMP 30 mL 2    Calcium Acetate, Phos Binder, (CALCIUM ACETATE PO) Take 675 mg by mouth      diphenoxylate-atropine (LOMOTIL) 2.5-0.025 MG per tablet Take 1 tablet by mouth 4 times daily as needed for Diarrhea.  (Patient not taking: No sig reported)      calcitRIOL (ROCALTROL) 0.25 MCG capsule Take 0.25 mcg by mouth daily hydrALAZINE (APRESOLINE) 50 MG tablet Take 1 tablet by mouth 2 times daily (Patient not taking: Reported on 2/23/2023)      furosemide (LASIX) 80 MG tablet Take 160 mg by mouth 2 times daily       metoprolol tartrate (LOPRESSOR) 50 MG tablet Take 50 mg by mouth 2 times daily      amLODIPine (NORVASC) 5 MG tablet Take 5 mg by mouth daily      vitamin D (CHOLECALCIFEROL) 5000 units CAPS capsule Take 5,000 Units by mouth daily         Allergies   Allergen Reactions    Fenofibrate Other (See Comments)     Muscle cramps in legs    Ace Inhibitors      Other reaction(s): Cough      I reviewed the HPI, medications, allergies, reason for surgery, diagnosis and treatment plan and there has been no change. The patient was evaluated by me today. Physical exam findings for this update include:    Vitals:    02/23/23 0720   BP: (!) 145/73   Pulse: 87   Resp: 12   Temp: 97.8 °F (36.6 °C)   SpO2: 99%     Airway is intact  Chest: chest clear, no wheezing, rales, normal symmetric air entry, no tachypnea, retractions or cyanosis  Heart: regular rate and rhythm ; heart sounds normal  Findings on exam of the body region where surgery is to be performed include:  Left trigger thumb.     Electronically signed by Amelie Yan MD on 2/23/2023 at 8:21 AM

## 2023-02-23 NOTE — ANESTHESIA PRE PROCEDURE
Department of Anesthesiology  Preprocedure Note       Name:  Lionel Peacock   Age:  79 y.o.  :  1955                                          MRN:  [id number]         Date:  2023      Surgeon: Nitin Galindo):  Ramirez Zarco MD    Procedure: Procedure(s):  LEFT THUMB A1 AND A2 PULLEY TRIGGER FINGER RELEASE    Medications prior to admission:   Prior to Admission medications    Medication Sig Start Date End Date Taking? Authorizing Provider   insulin aspart (NOVOLOG) 100 UNIT/ML injection vial TOTAL DAILY DOSE 60 UNITS DAILY. USE VIA INSULIN PUMP 1/10/23   Radhika Carrillo MD   fenofibrate (TRICOR) 145 MG tablet Take 1 tablet by mouth daily 1/10/23   Radhika Carrillo MD   Dulaglutide (TRULICITY) 9.46 HF/3.0YD SOPN ADMINISTER 0.75 MG UNDER THE SKIN 1 TIME A WEEK 1/10/23   Radhika Carrillo MD   CONTOUR NEXT TEST strip 1 each by In Vitro route 4 times daily As needed. 22   Radhika Carrillo MD   Insulin Infusion Pump Supplies (MINIMED PUMP RESERVOIR 3ML) MISC Change reservoir q 3 days 22   Radhika Carrillo MD   IV Sets-Tubing (INFUSION SET 23\") MISC Change infusion set q 3 days (please dispense minimed 630G compatible 23' set) 22   Radhika Carrillo MD   insulin aspart (NOVOLOG) 100 UNIT/ML injection vial TOTAL DAILY DOSE 60 UNITS  DAILY. USE VIA INSULIN PUMP 22   Radhika Carrillo MD   Calcium Acetate, Phos Binder, (CALCIUM ACETATE PO) Take 675 mg by mouth    Historical Provider, MD   diphenoxylate-atropine (LOMOTIL) 2.5-0.025 MG per tablet Take 1 tablet by mouth 4 times daily as needed for Diarrhea.   Patient not taking: No sig reported    Historical Provider, MD   calcitRIOL (ROCALTROL) 0.25 MCG capsule Take 0.25 mcg by mouth daily 20   Historical Provider, MD   hydrALAZINE (APRESOLINE) 50 MG tablet Take 1 tablet by mouth 2 times daily    Historical Provider, MD   furosemide (LASIX) 80 MG tablet Take 160 mg by mouth 2 times daily     Historical Provider, MD   metoprolol tartrate (LOPRESSOR) 50 MG tablet Take 50 mg by mouth 2 times daily    Historical Provider, MD   amLODIPine (NORVASC) 5 MG tablet Take 5 mg by mouth daily    Historical Provider, MD   vitamin D (CHOLECALCIFEROL) 5000 units CAPS capsule Take 5,000 Units by mouth daily    Historical Provider, MD       Current medications:    No current facility-administered medications for this visit. No current outpatient medications on file. Facility-Administered Medications Ordered in Other Visits   Medication Dose Route Frequency Provider Last Rate Last Admin    ceFAZolin (ANCEF) 2,000 mg in sodium chloride 0.9 % 50 mL IVPB (mini-bag)  2,000 mg IntraVENous On Call to 6135 Jared Link MD        0.9 % sodium chloride infusion   IntraVENous Continuous Tianna Browne MD           Allergies:     Allergies   Allergen Reactions    Fenofibrate Other (See Comments)     Muscle cramps in legs    Ace Inhibitors      Other reaction(s): Cough       Problem List:    Patient Active Problem List   Diagnosis Code    Type 2 diabetes, controlled, with neuropathy ([facility] [address] [address] E11.40    Mixed hyperlipidemia E78.2    Obesity (BMI 35.0-39.9 without comorbidity) E66.9    Essential hypertension I10    Vitamin D deficiency E55.9    CKD (chronic kidney disease) stage 5, GFR less than 15 ml/min (HCC) N18.5    Multinodular goiter E04.2    Controlled type 2 diabetes mellitus with diabetic nephropathy (HCC) E11.21    Uncontrolled type 2 diabetes mellitus with diabetic nephropathy [id number]    Class 2 obesity with body mass index (BMI) of 36.0 to 36.9 in adult E66.9, Z68.36    Class 2 obesity with body mass index (BMI) of 35.0 to 35.9 in adult E66.9, Z68.35    Class 1 obesity with body mass index (BMI) of 34.0 to 34.9 in adult E66.9, Z68.34    Trigger thumb of left hand M65.312    Carpal tunnel syndrome on left G56.02    Poorly controlled type 2 diabetes mellitus with neuropathy ([facility]) E11.40, E11.65    Diabetic nephropathy associated with type 2 diabetes mellitus ([facility] [address] E11.21       Past Medical History:        Diagnosis Date    Anemia     Dialysis patient ([facility] [facility]     Eye pressure     Hypertension     Kidney failure     Type 2 diabetes mellitus without complication ([facility] [address]        Past Surgical History:        Procedure Laterality Date    CARPAL TUNNEL RELEASE Left 11/10/2022    LEFT CARPAL TUNNEL RELEASE performed by Fernanda Jones MD at [address] [place] [address] Loop Left 11/10/2022    LEFT THUMB INTERPHALANGEAL JOINT TRIGGER FINGER RELEASE performed by Fernanda Jones MD at [address] [place] [address] History:    Social History     Tobacco Use    Smoking status: Never    Smokeless tobacco: Never   Substance Use Topics    Alcohol use: No                                Counseling given: Not Answered      Vital Signs (Current): There were no vitals filed for this visit.                                            BP Readings from Last 3 Encounters:   02/23/23 (!) 145/73   01/10/23 132/68   11/10/22 (!) 149/78       NPO Status:                                                                                 BMI:   Wt Readings from Last 3 Encounters:   02/23/23 187 lb (84.8 kg)   01/31/23 191 lb (86.6 kg)   01/10/23 191 lb (86.6 kg)     There is no height or weight on file to calculate BMI.    CBC: No results found for: WBC, RBC, HGB, HCT, MCV, RDW, PLT    CMP:   Lab Results   Component Value Date/Time     01/07/2023 09:36 AM    K 4.7 01/07/2023 09:36 AM    CL 98 01/07/2023 09:36 AM    CO2 26 01/07/2023 09:36 AM    BUN 23 01/07/2023 09:36 AM    CREATININE 4.5 01/07/2023 09:36 AM    GFRAA 9 10/19/2021 09:18 AM    GFRAA 9 10/19/2021 09:18 AM    AGRATIO 1.7 05/15/2020 09:07 AM    LABGLOM 9 11/10/2022 06:45 AM    GLUCOSE 173 01/07/2023 09:36 AM    PROT 6.4 01/07/2023 09:36 AM    CALCIUM 9.9 01/07/2023 09:36 AM    BILITOT 0.3 01/07/2023 09:36 AM    ALKPHOS 127 01/07/2023 09:36 AM    AST 11 01/07/2023 09:36 AM    ALT 13 01/07/2023 09:36 AM       POC Tests: No results for input(s): POCGLU, POCNA, POCK, POCCL, POCBUN, POCHEMO, POCHCT in the last 72 hours. Coags: No results found for: PROTIME, INR, APTT    HCG (If Applicable): No results found for: PREGTESTUR, PREGSERUM, HCG, HCGQUANT     ABGs: No results found for: PHART, PO2ART, TDW3VIO, YOH7YKY, BEART, C2FXGGPP     Type & Screen (If Applicable):  No results found for: LABABO, LABRH    Drug/Infectious Status (If Applicable):  No results found for: HIV, HEPCAB    COVID-19 Screening (If Applicable): No results found for: COVID19        Anesthesia Evaluation  Patient summary reviewed no history of anesthetic complications:   Airway: Mallampati: II  TM distance: >3 FB   Neck ROM: full  Mouth opening: > = 3 FB   Dental:          Pulmonary:                              Cardiovascular:    (+) hypertension: moderate,         Rhythm: regular  Rate: normal                    Neuro/Psych:   (+) neuromuscular disease:,             GI/Hepatic/Renal:   (+) renal disease: CRI,           Endo/Other:    (+) DiabetesType II DM, poorly controlled, , .                 Abdominal:             Vascular: Other Findings:             Anesthesia Plan      general     ASA 3     (OK for LMA.)  Induction: intravenous. Anesthetic plan and risks discussed with patient. Plan discussed with CRNA.                     Maverick Woodard MD   2/23/2023

## 2023-02-23 NOTE — PROGRESS NOTES
Pt moved to phase II. Pt alert and oriented. Room air.vss. pt stable . Tolerating po. Denies pain. D/c instructions reviewed with pt and  over phone. Verbalized understanding.

## 2023-02-23 NOTE — PROGRESS NOTES
Pt received into bay 10 from OR. Pt drowsy, yet arousable. Vss. Pt stable. Report obtained. Left thumb/hand dressing with ace bandage. Clean/dry/intact. Elevated with pillow. Ice pack applied to site. Cap refill <3sec. Denies pain. Will monitor.

## 2023-02-24 NOTE — OP NOTE
[facility]                     [address] [facility], [address] [facility]                                OPERATIVE REPORT    PATIENT NAME: Dee Dee Mayer                    :        1955  MED REC NO:   [id number]                          ROOM:  ACCOUNT NO:   [de-identified]                           ADMIT DATE: 2023  PROVIDER:     Dmitry Valenzuela MD    DATE OF PROCEDURE:  2023    PRIMARY CARE PROVIDER:  Verónica Mcfarland MD    PREOPERATIVE DIAGNOSIS:  Left hand trigger thumb. POSTOPERATIVE DIAGNOSIS:  Left hand trigger thumb. OPERATION PERFORMED:  Left hand trigger release with incision of tendon  sheath, A1 pulley. SURGEON:  Dmitry Valenzuela MD    ASSISTANT:  Luis Barros CNP    ANESTHESIA:  General anesthesia. ESTIMATED BLOOD LOSS:  Minimal.    COMPLICATIONS:  None. INDICATIONS:  This is a 71-year-old -American female, right-hand  dominant, who presented to our office with a trigger thumb. It was A2  pulley and had surgery for that one. That did not resolved her triggering and  she was found to have triggering at the AI pulley. The incision had  issue with healing and thumb infection initially for the A2 pulley and  then now it is completely resolved. She elected to proceed with the  surgical release of the A1 pulley of the left thumb. Given the patient's Body mass index is 33.13 kg/m². added significant challenge to the procedure. It required significant physical and mental effort. It required 80% more time for such procedure. DESCRIPTION OF THE PROCEDURE:  The patient's left hand was marked. She  received 2 gm Ancef IV preoperatively. The patient was then brought to  the operating room and underwent general anesthesia. We did not use the  tourniquet because she has an AV fistula. The left upper extremity was  then prepped and draped in a regular sterile routine fashion.   A  time-out was called confirming the patient name, the site, and the  procedure. A transverse incision was made over the A1 pulley area over the proximal  palmaris crease of the thumb. Careful dissection was performed exposing  the long tendon. We incised the tendon sheath with a #15 blade and  finished the release with the tenotomy scissors. After we finished the  release proximally and distally, we tested the thumb and there was no  triggering. At this point, we elected not to open up at the A2 pulley  area, as she has previous history of infection. We then irrigated the  incision copiously with normal saline and that was mixed with  gentamicin. We then closed the skin with a 4-0 nylon. Dressing was  then applied with Xeroform, 4x4s, sterile Webril, and Ace wrap. The patient tolerated the procedure well and was taken to the recovery  in stable condition. Tani Fernandez CNP was 1st Assist given the nature of the procedure that needed advanced assistance. She assisted in all aspect of the procedure, including but limited to draping in a sterile fashion, exposure of surgical area, controlling bleeding, retracting and protecting important structures, and surgical wound closure with dressing application. POSTOPERATIVE PLAN:  The patient will be discharged home. She was  encouraged to start immediate range of motion, but nonweightbearing for  at least two to three weeks.         Yesenia Bemrudez MD    D: 02/23/2023 15:39:49       T: 02/24/2023 3:31:01     /[email or web address]  Job#: [id number]     Doc#: [id number]    CC:  Catherine Jean-Baptiste

## 2023-02-27 NOTE — TELEPHONE ENCOUNTER
Rep Hortensia Lane) stated this needs to go through DME, not pharmacy. Not sure why this wasn't faxed before. Please process.

## 2023-02-28 ENCOUNTER — TELEPHONE (OUTPATIENT)
Dept: ORTHOPEDIC SURGERY | Age: 68
End: 2023-02-28

## 2023-02-28 NOTE — TELEPHONE ENCOUNTER
General Question     Subject: ANTIBIOTICS  Patient and /or Facility Request: Skye Abdullahi  Contact Number:  [phone]      PATIENT CALLING ABOUT THE ANTIBIOTICS SHE WAS PRESCRIBED. PATIENT STATES THEY ARE MAKING HER FEEL SICK AND SHE IS HAVING STOMACH PROBLEMS WITH THEM     PATIENT WANTS TO KNOW IF SHE CAN GET SOMETHING ELSE OR IF SHE SHOULD KEEP TAKING THE LAST FOUR SHE HAS     PLEASE CALL BACK AT THE ABOVE NUMBER

## 2023-03-07 ENCOUNTER — OFFICE VISIT (OUTPATIENT)
Dept: ORTHOPEDIC SURGERY | Age: 68
End: 2023-03-07

## 2023-03-07 VITALS — BODY MASS INDEX: 33.13 KG/M2 | WEIGHT: 187 LBS | HEIGHT: 63 IN

## 2023-03-07 DIAGNOSIS — M65.312 TRIGGER THUMB OF LEFT HAND: Primary | ICD-10-CM

## 2023-03-07 PROCEDURE — 99024 POSTOP FOLLOW-UP VISIT: CPT | Performed by: NURSE PRACTITIONER

## 2023-03-07 PROCEDURE — APPNB15 APP NON BILLABLE TIME 0-15 MINS: Performed by: NURSE PRACTITIONER

## 2023-03-08 NOTE — PROGRESS NOTES
DIAGNOSIS:  Left Thumb trigger finger release. DATE OF SURGERY: 2/23/2023. HISTORY OF PRESENT ILLNESS:  Ms. Spencer Sizer 79 y.o.  female who came in today for 2 weeks postoperative visit. The patient denies any significant pain in the left Thumb. She has been doing ROM. No more triggreing. No fever or Chills. PHYSICAL EXAMINATION:  The incision healing well . No signs of any erythema or drainage, minimal swelling. She has no pain with the active or passive range of motion of the left Thumb, good ROM with no triggring. She has intact sensation distally, and she is neurovascularly intact. IMPRESSION:  2 weeks out from left Thumb trigger release, and doing very well. PLAN: She can go back to normal activity with no heavy impact activities for 6 weeks. I discussed with her the risk and benefits of suture removal and she agreed to proceed. Tolerated well. The patient will come back for a follow up in 3 months. The patient understands that there is a chance of trigger finger recurrence even after surgical release.     Michael Steele, APRN - CNP

## 2023-03-16 ENCOUNTER — TELEPHONE (OUTPATIENT)
Dept: ENDOCRINOLOGY | Age: 68
End: 2023-03-16

## 2023-03-16 DIAGNOSIS — E11.65 POORLY CONTROLLED TYPE 2 DIABETES MELLITUS WITH NEUROPATHY (HCC): Primary | ICD-10-CM

## 2023-03-16 DIAGNOSIS — E11.40 POORLY CONTROLLED TYPE 2 DIABETES MELLITUS WITH NEUROPATHY (HCC): Primary | ICD-10-CM

## 2023-03-16 RX ORDER — BLOOD SUGAR DIAGNOSTIC
1 STRIP MISCELLANEOUS 4 TIMES DAILY
Qty: 400 EACH | Refills: 3 | Status: SHIPPED | OUTPATIENT
Start: 2023-03-16

## 2023-03-16 NOTE — TELEPHONE ENCOUNTER
Patient called requesting a refill     Rx- Accu chek guide test strips     Santiago Chan 411- 1/10/23  NOV- 5/9/23    Please advise

## 2023-04-05 ENCOUNTER — TELEPHONE (OUTPATIENT)
Dept: ENDOCRINOLOGY | Age: 68
End: 2023-04-05

## 2023-04-05 NOTE — TELEPHONE ENCOUNTER
[facility] asking if we have rec'd a form for a request to send chart notes.  Did verify the correct fax # from them    CB# [phone] option 2, if you did not receive fax

## 2023-04-29 LAB
ALBUMIN/GLOBULIN RATIO: 2 RATIO (ref 0.8–2.6)
ALBUMIN: 4 G/DL (ref 3.5–5.2)
ALP BLD-CCNC: 66 U/L (ref 23–144)
ALT SERPL-CCNC: 13 U/L (ref 0–60)
AST SERPL-CCNC: 17 U/L (ref 0–55)
BILIRUB SERPL-MCNC: 0.3 MG/DL (ref 0–1.2)
BUN BLDV-MCNC: 24 MG/DL (ref 3–29)
BUN/CREAT BLD: 6 (ref 7–25)
CALCIUM SERPL-MCNC: 9.8 MG/DL (ref 8.5–10.5)
CHLORIDE BLD-SCNC: 100 MEQ/L (ref 96–110)
CHOLESTEROL: 142 MG/DL
CO2: 25 MEQ/L (ref 19–32)
CREAT SERPL-MCNC: 4.3 MG/DL (ref 0.5–1.2)
GLOBULIN: 2 G/DL (ref 1.9–3.6)
GLOMERULAR FILTRATION RATE: 11 MLS/MIN/1.73M2
GLUCOSE BLD-MCNC: 92 MG/DL (ref 70–99)
HBA1C MFR BLD: 7.5 % (ref 4–6)
HDLC SERPL-MCNC: 55 MG/DL
LDL CHOLESTEROL CALCULATED: 69 MG/DL
POTASSIUM SERPL-SCNC: 5 MEQ/L (ref 3.4–5.3)
SODIUM BLD-SCNC: 136 MEQ/L (ref 135–148)
STATUS: ABNORMAL
T4 FREE: 1.21 NG/DL (ref 0.8–1.8)
TOTAL PROTEIN: 6 G/DL (ref 6–8.3)
TRIGL SERPL-MCNC: 88 MG/DL
TSH SERPL DL<=0.05 MIU/L-ACNC: 1.5 MCIU/ML (ref 0.4–4.5)
VITAMIN D 25-HYDROXY: 49 NG/ML (ref 30–100)
VLDLC SERPL CALC-MCNC: 18 MG/DL (ref 4–38)

## 2023-05-09 ENCOUNTER — OFFICE VISIT (OUTPATIENT)
Dept: ENDOCRINOLOGY | Age: 68
End: 2023-05-09
Payer: COMMERCIAL

## 2023-05-09 VITALS
RESPIRATION RATE: 14 BRPM | SYSTOLIC BLOOD PRESSURE: 127 MMHG | TEMPERATURE: 98 F | HEART RATE: 77 BPM | OXYGEN SATURATION: 100 % | WEIGHT: 183 LBS | DIASTOLIC BLOOD PRESSURE: 70 MMHG | HEIGHT: 63 IN | BODY MASS INDEX: 32.43 KG/M2

## 2023-05-09 DIAGNOSIS — E55.9 VITAMIN D DEFICIENCY: ICD-10-CM

## 2023-05-09 DIAGNOSIS — E11.21 DIABETIC NEPHROPATHY ASSOCIATED WITH TYPE 2 DIABETES MELLITUS (HCC): ICD-10-CM

## 2023-05-09 DIAGNOSIS — E11.65 POORLY CONTROLLED TYPE 2 DIABETES MELLITUS WITH NEUROPATHY (HCC): Primary | ICD-10-CM

## 2023-05-09 DIAGNOSIS — E11.40 POORLY CONTROLLED TYPE 2 DIABETES MELLITUS WITH NEUROPATHY (HCC): Primary | ICD-10-CM

## 2023-05-09 DIAGNOSIS — E78.2 MIXED HYPERLIPIDEMIA: ICD-10-CM

## 2023-05-09 DIAGNOSIS — N18.5 CKD (CHRONIC KIDNEY DISEASE) STAGE 5, GFR LESS THAN 15 ML/MIN (HCC): ICD-10-CM

## 2023-05-09 DIAGNOSIS — E04.2 MULTINODULAR GOITER: ICD-10-CM

## 2023-05-09 DIAGNOSIS — E66.9 CLASS 1 OBESITY WITH SERIOUS COMORBIDITY AND BODY MASS INDEX (BMI) OF 34.0 TO 34.9 IN ADULT, UNSPECIFIED OBESITY TYPE: ICD-10-CM

## 2023-05-09 DIAGNOSIS — I10 ESSENTIAL HYPERTENSION: ICD-10-CM

## 2023-05-09 PROBLEM — E66.811 CLASS 1 OBESITY WITH SERIOUS COMORBIDITY AND BODY MASS INDEX (BMI) OF 34.0 TO 34.9 IN ADULT: Status: ACTIVE | Noted: 2023-05-09

## 2023-05-09 PROCEDURE — 1123F ACP DISCUSS/DSCN MKR DOCD: CPT | Performed by: INTERNAL MEDICINE

## 2023-05-09 PROCEDURE — 99214 OFFICE O/P EST MOD 30 MIN: CPT | Performed by: INTERNAL MEDICINE

## 2023-05-09 PROCEDURE — 3051F HG A1C>EQUAL 7.0%<8.0%: CPT | Performed by: INTERNAL MEDICINE

## 2023-05-09 PROCEDURE — 3074F SYST BP LT 130 MM HG: CPT | Performed by: INTERNAL MEDICINE

## 2023-05-09 PROCEDURE — 3078F DIAST BP <80 MM HG: CPT | Performed by: INTERNAL MEDICINE

## 2023-05-09 PROCEDURE — 95251 CONT GLUC MNTR ANALYSIS I&R: CPT | Performed by: INTERNAL MEDICINE

## 2023-05-09 NOTE — PROGRESS NOTES
other diagnostic tests No  Discussed test results with performing physician No  Independently reviewed image, tracing, or specimen   Recommendations and assessment and plan  Made a decision to obtain old records Yes  Reviewed old records No  Obtained history from other than patient No    Comfort Montez was counseled regarding symptoms of diabetes diagnosis, course and complications of disease if inadequately treated, side effects of medications, diagnosis, treatment options, and prognosis, risks, benefits, complications, and alternatives of treatment, labs, imaging and other studies and treatment targets and goals, thyroid cancer risk. She understands instructions and counseling. CGMS Download Review and Recommendations  See scanned document for blood glucose tracing documentation  Guardian personal CGMS data downloaded and reviewed. This was separate service provided-CGM interpretation    Average glucose 130± 42 SD  Time in range: 83%  Time above 180: 12%  Time under 70: 5%   GMI 6.4%    Basal pattern review: Basal normoglycemia and hypoglycemia  Postprandial pattern review: Postprandial hyperglycemia  Hypoglycemia review: Mostly before lunch  Activity related review: Not recorded      Based on the data, I recommend:    1. Healthy food choices, portion control    2. Do not underestimate carbohydrates    3. Do timely boluses before meals. Patient is doing much better with bolusing before meals. Patient is currently bolusing later into the meal    4. Hypoglycemia management    5. Stay in auto mode as much as possible. Patient is currently in auto mode 78%. Return in about 4 months (around 9/9/2023) for diabetes.

## 2023-06-26 RX ORDER — DULAGLUTIDE 0.75 MG/.5ML
INJECTION, SOLUTION SUBCUTANEOUS
Qty: 6 ML | Refills: 0 | Status: SHIPPED | OUTPATIENT
Start: 2023-06-26

## 2023-09-23 LAB
ALBUMIN/GLOBULIN RATIO: 2 RATIO (ref 0.8–2.6)
ALBUMIN: 4.2 G/DL (ref 3.5–5.2)
ALP BLD-CCNC: 109 U/L (ref 23–144)
ALT SERPL-CCNC: 7 U/L (ref 0–60)
AST SERPL-CCNC: 14 U/L (ref 0–55)
BILIRUB SERPL-MCNC: 0.2 MG/DL (ref 0–1.2)
BUN BLDV-MCNC: 28 MG/DL (ref 3–29)
BUN/CREAT BLD: 6 (ref 7–25)
CALCIUM SERPL-MCNC: 10.3 MG/DL (ref 8.5–10.5)
CHLORIDE BLD-SCNC: 102 MEQ/L (ref 96–110)
CHOLESTEROL: 144 MG/DL
CO2: 28 MEQ/L (ref 19–32)
CREAT SERPL-MCNC: 4.7 MG/DL (ref 0.5–1.2)
GLOBULIN: 2.1 G/DL (ref 1.9–3.6)
GLOMERULAR FILTRATION RATE: 10 MLS/MIN/1.73M2
GLUCOSE BLD-MCNC: 136 MG/DL (ref 70–99)
HBA1C MFR BLD: 7.5 % (ref 4–6)
HDLC SERPL-MCNC: 51 MG/DL
LDL CHOLESTEROL CALCULATED: 74 MG/DL
POTASSIUM SERPL-SCNC: 5.8 MEQ/L (ref 3.4–5.3)
SODIUM BLD-SCNC: 138 MEQ/L (ref 135–148)
STATUS: ABNORMAL
T4 FREE: 1.34 NG/DL (ref 0.8–1.8)
TOTAL PROTEIN: 6.3 G/DL (ref 6–8.3)
TRIGL SERPL-MCNC: 97 MG/DL
TSH SERPL DL<=0.05 MIU/L-ACNC: 1.07 MCIU/ML (ref 0.4–4.5)
VITAMIN D 25-HYDROXY: 37 NG/ML (ref 30–100)
VLDLC SERPL CALC-MCNC: 19 MG/DL (ref 4–38)

## 2023-09-26 ENCOUNTER — OFFICE VISIT (OUTPATIENT)
Dept: ENDOCRINOLOGY | Age: 68
End: 2023-09-26
Payer: COMMERCIAL

## 2023-09-26 VITALS
WEIGHT: 178 LBS | HEIGHT: 63 IN | RESPIRATION RATE: 14 BRPM | SYSTOLIC BLOOD PRESSURE: 122 MMHG | BODY MASS INDEX: 31.54 KG/M2 | OXYGEN SATURATION: 98 % | HEART RATE: 79 BPM | DIASTOLIC BLOOD PRESSURE: 55 MMHG | TEMPERATURE: 98 F

## 2023-09-26 DIAGNOSIS — E55.9 VITAMIN D DEFICIENCY: ICD-10-CM

## 2023-09-26 DIAGNOSIS — E11.65 POORLY CONTROLLED TYPE 2 DIABETES MELLITUS WITH NEUROPATHY (HCC): Primary | ICD-10-CM

## 2023-09-26 DIAGNOSIS — I10 ESSENTIAL HYPERTENSION: ICD-10-CM

## 2023-09-26 DIAGNOSIS — N18.5 CKD (CHRONIC KIDNEY DISEASE) STAGE 5, GFR LESS THAN 15 ML/MIN (HCC): ICD-10-CM

## 2023-09-26 DIAGNOSIS — E78.2 MIXED HYPERLIPIDEMIA: ICD-10-CM

## 2023-09-26 DIAGNOSIS — E66.9 CLASS 1 OBESITY WITH SERIOUS COMORBIDITY AND BODY MASS INDEX (BMI) OF 31.0 TO 31.9 IN ADULT, UNSPECIFIED OBESITY TYPE: ICD-10-CM

## 2023-09-26 DIAGNOSIS — E04.2 MULTINODULAR GOITER: ICD-10-CM

## 2023-09-26 DIAGNOSIS — E11.21 DIABETIC NEPHROPATHY ASSOCIATED WITH TYPE 2 DIABETES MELLITUS (HCC): ICD-10-CM

## 2023-09-26 DIAGNOSIS — E11.40 POORLY CONTROLLED TYPE 2 DIABETES MELLITUS WITH NEUROPATHY (HCC): Primary | ICD-10-CM

## 2023-09-26 PROBLEM — E66.811 CLASS 1 OBESITY WITH BODY MASS INDEX (BMI) OF 32.0 TO 32.9 IN ADULT: Status: ACTIVE | Noted: 2023-09-26

## 2023-09-26 PROCEDURE — 3051F HG A1C>EQUAL 7.0%<8.0%: CPT | Performed by: INTERNAL MEDICINE

## 2023-09-26 PROCEDURE — 1123F ACP DISCUSS/DSCN MKR DOCD: CPT | Performed by: INTERNAL MEDICINE

## 2023-09-26 PROCEDURE — 95251 CONT GLUC MNTR ANALYSIS I&R: CPT | Performed by: INTERNAL MEDICINE

## 2023-09-26 PROCEDURE — 99214 OFFICE O/P EST MOD 30 MIN: CPT | Performed by: INTERNAL MEDICINE

## 2023-09-26 PROCEDURE — 3078F DIAST BP <80 MM HG: CPT | Performed by: INTERNAL MEDICINE

## 2023-09-26 PROCEDURE — 3074F SYST BP LT 130 MM HG: CPT | Performed by: INTERNAL MEDICINE

## 2023-09-26 RX ORDER — SEVELAMER CARBONATE 800 MG/1
TABLET, FILM COATED ORAL
COMMUNITY
Start: 2023-07-24

## 2023-09-26 RX ORDER — DULAGLUTIDE 0.75 MG/.5ML
INJECTION, SOLUTION SUBCUTANEOUS
Qty: 6 ML | Refills: 1 | Status: CANCELLED | OUTPATIENT
Start: 2023-09-26

## 2023-09-26 RX ORDER — CALCIUM ACETATE 667 MG/1
CAPSULE ORAL
COMMUNITY
Start: 2023-09-02

## 2023-09-26 NOTE — PROGRESS NOTES
Plus at least one of the following:  Pulses: normal  Proprioception: Intact  Vibration (128 Hz): Impaired    ASSESSMENT/PLAN:    1. Type 2 diabetes mellitus, poorly controlled, with neuropathy (HCC)  Hemoglobin A1c 6.6-6.3-6.7-8.2-6.7-6.0-5.5-7.6-7.5-7.5  Continue same pump settings. Novolog in pump  Advise to upgrade to Medtronic 857/YKDTQDDS 4  Increase Trulicity to 1.5 mg weekly  - Comprehensive Metabolic Panel; Future  - Hemoglobin A1C; Future     2. Mixed hyperlipidemia  Fenofibrate 145 mg daily  LDL cholesterol 92-02--09--69-74  HDL 33-55-51  Triglycerides [phone]  Had muscle pain, leg cramping on statins  Stopped Pravastatin 20 mg daily. Was having stomach pain. Refuses statins. Understands the risks. - Lipid Panel; Future     3. Obesity   Diet, activity as tolerated. 4. Essential hypertension  Current meds. 5. Vitamin D deficiency  25 hydroxy vitamin D 25-04-36-52-67-45-43-49-37  Vitamin D 25 Hydroxy; Future  Vitamin D 5000 IU every day  Do not decrease the dose    6. CKD (chronic kidney disease) stage 5  Potassium 5.8, patient will contact Nephrology  Creatinine 4.4-4.3-4.7  GFR 10-11-10  Nephrology follow up. On HD 3 times weekly     7. MNG  TSH 2.0-1.0-0.7-1.23-1.22-0.895-1.07  Monitor with thyroid sonogram  Patient consistently refuses biopsy. She understands the risks. 5/2/2023  Right 2.2 cm, right 1.7 cm, left 2.7 cm nodules  Refused biopsy    6/21/2022  I recommended FNA of thyroid 3.8 cm and left 2.9 cm nodules. Again, patient refused. She understands risk of cancer. Patient is aware about thyroid biopsy order is in the chart. Just needs to schedule biopsy when she is ready. 7/6/2021  I recommended FNA of the right 1.9 cm, 2.5cm, 2.6 cm nodules  Refused another biopsy, prefers monitoring. Understands risk of thyroid cancer and thyroid nodules  - Thyroid sonogram     8.  Nephropathy  Follow with Nephrology  Microalbumin creatinine ratio

## 2023-10-25 RX ORDER — FENOFIBRATE 145 MG/1
145 TABLET, COATED ORAL DAILY
Qty: 90 TABLET | Refills: 1 | Status: SHIPPED | OUTPATIENT
Start: 2023-10-25

## 2023-11-07 ENCOUNTER — TELEPHONE (OUTPATIENT)
Dept: ENDOCRINOLOGY | Age: 68
End: 2023-11-07

## 2023-11-07 DIAGNOSIS — E11.65 POORLY CONTROLLED TYPE 2 DIABETES MELLITUS WITH NEUROPATHY (HCC): Primary | ICD-10-CM

## 2023-11-07 DIAGNOSIS — E11.40 POORLY CONTROLLED TYPE 2 DIABETES MELLITUS WITH NEUROPATHY (HCC): Primary | ICD-10-CM

## 2023-11-07 RX ORDER — DULAGLUTIDE 1.5 MG/.5ML
1.5 INJECTION, SOLUTION SUBCUTANEOUS WEEKLY
Qty: 2 ML | Refills: 0 | Status: SHIPPED | OUTPATIENT
Start: 2023-11-07

## 2023-11-07 NOTE — TELEPHONE ENCOUNTER
Call from pt stating that Dr. Charlene Richards informed her at her LOV that she was going to increase her dose for Rx Trulicity 1.82 mg to the next dose     Pt is wanting to know if Dr. Charlene Richards could send in the new script with increased dose for Trulicity     Please advise

## 2024-03-16 LAB
A/G RATIO: 1.7 RATIO (ref 0.8–2.6)
ALBUMIN SERPL-MCNC: 4 G/DL (ref 3.5–5.2)
ALP BLD-CCNC: 84 U/L (ref 23–144)
ALT SERPL-CCNC: 17 U/L (ref 0–60)
AST SERPL-CCNC: 25 U/L (ref 0–55)
BILIRUB SERPL-MCNC: 0.4 MG/DL (ref 0–1.2)
BUN / CREAT RATIO: 7 (ref 7–25)
BUN BLDV-MCNC: 31 MG/DL (ref 3–29)
CALCIUM SERPL-MCNC: 10 MG/DL (ref 8.5–10.5)
CHLORIDE BLD-SCNC: 102 MEQ/L (ref 96–110)
CHOLESTEROL, TOTAL: 128 MG/DL
CO2: 27 MEQ/L (ref 19–32)
CREAT SERPL-MCNC: 4.3 MG/DL (ref 0.5–1.2)
ESTIMATED GLOMERULAR FILTRATION RATE CREATININE EQUATION: 11 MLS/MIN/1.73M2
FASTING STATUS: ABNORMAL
GLOBULIN: 2.4 G/DL (ref 1.9–3.6)
GLUCOSE BLD-MCNC: 96 MG/DL (ref 70–99)
HBA1C MFR BLD: 5.9 % (ref 4–6)
HDLC SERPL-MCNC: 58 MG/DL
LDL CHOLESTEROL CALCULATED: 55 MG/DL
POTASSIUM SERPL-SCNC: 4.8 MEQ/L (ref 3.4–5.3)
SODIUM BLD-SCNC: 138 MEQ/L (ref 135–148)
TOTAL PROTEIN: 6.4 G/DL (ref 6–8.3)
TRIGL SERPL-MCNC: 74 MG/DL
VITAMIN D 25-HYDROXY: 48 NG/ML (ref 30–100)
VLDLC SERPL CALC-MCNC: 15 MG/DL (ref 4–38)

## 2024-03-17 PROBLEM — E66.811 CLASS 1 OBESITY WITH SERIOUS COMORBIDITY AND BODY MASS INDEX (BMI) OF 31.0 TO 31.9 IN ADULT: Status: ACTIVE | Noted: 2024-03-17

## 2024-03-17 PROBLEM — E66.9 CLASS 1 OBESITY WITH SERIOUS COMORBIDITY AND BODY MASS INDEX (BMI) OF 31.0 TO 31.9 IN ADULT: Status: ACTIVE | Noted: 2024-03-17

## 2024-03-18 ENCOUNTER — OFFICE VISIT (OUTPATIENT)
Dept: ENDOCRINOLOGY | Age: 69
End: 2024-03-18
Payer: MEDICARE

## 2024-03-18 VITALS
TEMPERATURE: 98 F | SYSTOLIC BLOOD PRESSURE: 126 MMHG | DIASTOLIC BLOOD PRESSURE: 62 MMHG | OXYGEN SATURATION: 99 % | WEIGHT: 172 LBS | HEART RATE: 74 BPM | HEIGHT: 63 IN | RESPIRATION RATE: 14 BRPM | BODY MASS INDEX: 30.48 KG/M2

## 2024-03-18 DIAGNOSIS — E11.65 POORLY CONTROLLED TYPE 2 DIABETES MELLITUS WITH NEUROPATHY (HCC): Primary | ICD-10-CM

## 2024-03-18 DIAGNOSIS — I10 ESSENTIAL HYPERTENSION: ICD-10-CM

## 2024-03-18 DIAGNOSIS — E04.2 MULTINODULAR GOITER: ICD-10-CM

## 2024-03-18 DIAGNOSIS — N18.5 CKD (CHRONIC KIDNEY DISEASE) STAGE 5, GFR LESS THAN 15 ML/MIN (HCC): ICD-10-CM

## 2024-03-18 DIAGNOSIS — E11.21 DIABETIC NEPHROPATHY ASSOCIATED WITH TYPE 2 DIABETES MELLITUS (HCC): ICD-10-CM

## 2024-03-18 DIAGNOSIS — E78.2 MIXED HYPERLIPIDEMIA: ICD-10-CM

## 2024-03-18 DIAGNOSIS — E66.9 CLASS 1 OBESITY WITH BODY MASS INDEX (BMI) OF 30.0 TO 30.9 IN ADULT, UNSPECIFIED OBESITY TYPE, UNSPECIFIED WHETHER SERIOUS COMORBIDITY PRESENT: ICD-10-CM

## 2024-03-18 DIAGNOSIS — E11.40 POORLY CONTROLLED TYPE 2 DIABETES MELLITUS WITH NEUROPATHY (HCC): Primary | ICD-10-CM

## 2024-03-18 DIAGNOSIS — E55.9 VITAMIN D DEFICIENCY: ICD-10-CM

## 2024-03-18 PROBLEM — E66.811 CLASS 1 OBESITY WITH BODY MASS INDEX (BMI) OF 30.0 TO 30.9 IN ADULT: Status: ACTIVE | Noted: 2024-03-18

## 2024-03-18 PROCEDURE — 1123F ACP DISCUSS/DSCN MKR DOCD: CPT | Performed by: INTERNAL MEDICINE

## 2024-03-18 PROCEDURE — 99214 OFFICE O/P EST MOD 30 MIN: CPT | Performed by: INTERNAL MEDICINE

## 2024-03-18 PROCEDURE — 3074F SYST BP LT 130 MM HG: CPT | Performed by: INTERNAL MEDICINE

## 2024-03-18 PROCEDURE — 95251 CONT GLUC MNTR ANALYSIS I&R: CPT | Performed by: INTERNAL MEDICINE

## 2024-03-18 PROCEDURE — 3078F DIAST BP <80 MM HG: CPT | Performed by: INTERNAL MEDICINE

## 2024-03-18 RX ORDER — INFUSION SET FOR INSULIN PUMP
INFUSION SETS-PARAPHERNALIA MISCELLANEOUS
Qty: 60 EACH | Refills: 3 | Status: SHIPPED | OUTPATIENT
Start: 2024-03-18

## 2024-03-18 RX ORDER — FENOFIBRATE 145 MG/1
145 TABLET, COATED ORAL DAILY
Qty: 90 TABLET | Refills: 1 | Status: SHIPPED | OUTPATIENT
Start: 2024-03-18

## 2024-03-18 RX ORDER — DULAGLUTIDE 1.5 MG/.5ML
1.5 INJECTION, SOLUTION SUBCUTANEOUS WEEKLY
Qty: 6 ML | Refills: 1 | Status: SHIPPED | OUTPATIENT
Start: 2024-03-18

## 2024-03-18 NOTE — PROGRESS NOTES
Comfort Montez is a 68 y.o. female who presents for Type 2 diabetes mellitus.     Current symptoms/problems include  hyperglycemia  and show no change.     1.  Poorly controlled type 2 diabetes mellitus with neuropathy (HCC) [E11.40, E11.65]    Diagnosed with Type 2 diabetes mellitus in 1968.     Comorbid conditions:  hyperlipidemia, CKD and Neuropathy     Current diabetic medications include: Novolog, Trulicity     Intolerance to diabetes medications: No     Weight trend: stable  Prior visit with dietician: yes  Current diet: on average, 3 meals per day  Current exercise: active     Current monitoring regimen: home blood tests - 4 times daily   Has brought blood glucose log/meter:  Yes  Home blood sugar records: fasting range: 60-90 and postprandial range:   Any episodes of hypoglycemia? Rare  Hypoglycemia frequency and time(s):  twice a month  Does patient have Glucagon emergency kit? No  Does patient have rapid acting carbohydrate?  Yes  Does patient wear a medic alert bracelet or necklace?  No     2. Mixed hyperlipidemia  Has muscle pain.     3. Obesity   Eats healthy.     4. Essential hypertension  No headaches.     5. Vitamin D deficiency  No bone pain.     6. CKD (chronic kidney disease) stage 5  No urination problems.     7. MNG  No difficulty swallowing, voice change, history of radiation, thyroid cancer in family.     8. Nephropathy  Has decreased urination    THYROID   EXAM: US NECK OR HEAD SOFT TISSUE     INDICATION:  Nontoxic multinodular goiter,     COMPARISON:  Multiple prior studies. Most recent thyroid ultrasound from July 6, 2021.     TECHNIQUE:  Multiplanar grayscale analysis of the thyroid was performed.       FINDINGS:     The isthmus measures 8 mm in anteroposterior dimension. The right lobe measures 5.1 x 2.2 x 2.5    cm. The left lobe measures 5.4 x 2.7 x 2.6 cm.     Nodule 1   Location: Mid right thyroid lobe   Size: 2.2 x 2.2 x 1.5 CM   Composition: Solid or Almost Completely Solid

## 2024-04-02 ENCOUNTER — TELEPHONE (OUTPATIENT)
Dept: ENDOCRINOLOGY | Age: 69
End: 2024-04-02

## 2024-04-02 DIAGNOSIS — E11.65 POORLY CONTROLLED TYPE 2 DIABETES MELLITUS WITH NEUROPATHY (HCC): Primary | ICD-10-CM

## 2024-04-02 DIAGNOSIS — E11.40 POORLY CONTROLLED TYPE 2 DIABETES MELLITUS WITH NEUROPATHY (HCC): Primary | ICD-10-CM

## 2024-04-02 RX ORDER — TIRZEPATIDE 2.5 MG/.5ML
2.5 INJECTION, SOLUTION SUBCUTANEOUS WEEKLY
Qty: 2 ML | Refills: 0 | Status: SHIPPED | OUTPATIENT
Start: 2024-04-02

## 2024-04-02 NOTE — TELEPHONE ENCOUNTER
Call from pt stating that her Rx Trulicity has been on back order for 2 weeks now. Pt stated because it's been on back order she has not been able to take her medication     Pt is wanting to know if there is an alternative that Dr. Orellana could prescribe?    Please advise   CB# [phone]

## 2024-04-02 NOTE — TELEPHONE ENCOUNTER
Spoke w/ pharmacy, they have NO trulicity or any doses of it in stock currently. They have all  Mounjaro doses in stock. They do currently have Ozempic 0.5mg    Please advise.

## 2024-04-11 ENCOUNTER — TELEPHONE (OUTPATIENT)
Dept: ENDOCRINOLOGY | Age: 69
End: 2024-04-11

## 2024-04-11 DIAGNOSIS — E11.40 POORLY CONTROLLED TYPE 2 DIABETES MELLITUS WITH NEUROPATHY (HCC): Primary | ICD-10-CM

## 2024-04-11 DIAGNOSIS — E11.65 POORLY CONTROLLED TYPE 2 DIABETES MELLITUS WITH NEUROPATHY (HCC): Primary | ICD-10-CM

## 2024-04-11 NOTE — TELEPHONE ENCOUNTER
Medication Alter-Provider Communication Request for RX change received and scanned into the chart.

## 2024-04-18 NOTE — TELEPHONE ENCOUNTER
Reviewed the correspondence.  That is a document describing how to prescribe gabapentin for dialysis patients.  Will discuss with patient.

## 2024-04-19 RX ORDER — TIRZEPATIDE 5 MG/.5ML
5 INJECTION, SOLUTION SUBCUTANEOUS WEEKLY
Qty: 2 ML | Refills: 0 | Status: SHIPPED | OUTPATIENT
Start: 2024-04-19

## 2024-04-19 NOTE — TELEPHONE ENCOUNTER
Spoke with patient.  She has been on gabapentin 3 mg daily for a long time and does not have problems.  Advised her to discuss with dialysis providers as well.  Will continue same dose for now.  Patient does not have problems with Mounjaro injections.  She does not have nausea.  Her weight is the same 172 pounds.  Her blood sugars unchanged.  Will send prescription for 5 mg weekly dose.

## 2024-04-22 ENCOUNTER — TELEPHONE (OUTPATIENT)
Dept: ENDOCRINOLOGY | Age: 69
End: 2024-04-22

## 2024-04-22 DIAGNOSIS — E11.40 POORLY CONTROLLED TYPE 2 DIABETES MELLITUS WITH NEUROPATHY (HCC): Primary | ICD-10-CM

## 2024-04-22 DIAGNOSIS — E11.65 POORLY CONTROLLED TYPE 2 DIABETES MELLITUS WITH NEUROPATHY (HCC): Primary | ICD-10-CM

## 2024-04-22 NOTE — TELEPHONE ENCOUNTER
Call from pt stating that [facility] pharmacy informed her that they do not have Mounjaro in stock     Pt stated that she called around and found [facility] pharmacy on [address] has Mounjaro 2.5 mg and 15 mg in stock     Pt is wanting to know if Dr. Orellana would like her to continue with Mounjaro 2.5 mg and if she would send the script to the pharmacy     Please advise   CB# [phone]   (0) swallows foods/liquids without difficulty

## 2024-04-23 RX ORDER — TIRZEPATIDE 2.5 MG/.5ML
2.5 INJECTION, SOLUTION SUBCUTANEOUS WEEKLY
Qty: 2 ML | Refills: 0 | Status: SHIPPED | OUTPATIENT
Start: 2024-04-23

## 2024-05-24 DIAGNOSIS — E11.40 POORLY CONTROLLED TYPE 2 DIABETES MELLITUS WITH NEUROPATHY (HCC): ICD-10-CM

## 2024-05-24 DIAGNOSIS — E11.65 POORLY CONTROLLED TYPE 2 DIABETES MELLITUS WITH NEUROPATHY (HCC): ICD-10-CM

## 2024-05-24 RX ORDER — TIRZEPATIDE 2.5 MG/.5ML
INJECTION, SOLUTION SUBCUTANEOUS
OUTPATIENT
Start: 2024-05-24

## 2024-05-24 NOTE — TELEPHONE ENCOUNTER
Lvm to pt to call office and let us know what dose she needs. She has both 2.5 and 5 in her chart. Which one is she on currently? We would need to increase from that dose    Any SE? Current wt?

## 2024-06-08 DIAGNOSIS — E11.65 POORLY CONTROLLED TYPE 2 DIABETES MELLITUS WITH NEUROPATHY (HCC): ICD-10-CM

## 2024-06-08 DIAGNOSIS — E11.40 POORLY CONTROLLED TYPE 2 DIABETES MELLITUS WITH NEUROPATHY (HCC): ICD-10-CM

## 2024-06-10 RX ORDER — TIRZEPATIDE 2.5 MG/.5ML
INJECTION, SOLUTION SUBCUTANEOUS
Qty: 6 ML | Refills: 0 | Status: SHIPPED | OUTPATIENT
Start: 2024-06-10

## 2024-06-10 NOTE — TELEPHONE ENCOUNTER
Pt called back stating she doesn't want to increase just wants the 5 pt says her sugars are doing good

## 2024-06-20 ENCOUNTER — TELEPHONE (OUTPATIENT)
Dept: ENDOCRINOLOGY | Age: 69
End: 2024-06-20

## 2024-06-20 DIAGNOSIS — E11.65 POORLY CONTROLLED TYPE 2 DIABETES MELLITUS WITH NEUROPATHY (HCC): ICD-10-CM

## 2024-06-20 DIAGNOSIS — E11.40 POORLY CONTROLLED TYPE 2 DIABETES MELLITUS WITH NEUROPATHY (HCC): ICD-10-CM

## 2024-06-20 RX ORDER — TIRZEPATIDE 5 MG/.5ML
5 INJECTION, SOLUTION SUBCUTANEOUS WEEKLY
Qty: 2 ML | Refills: 0 | Status: SHIPPED | OUTPATIENT
Start: 2024-06-20

## 2024-06-20 NOTE — TELEPHONE ENCOUNTER
Requested Prescriptions     Signed Prescriptions Disp Refills    Tirzepatide (MOUNJARO) 5 MG/0.5ML SOPN SC injection 2 mL 0     Sig: Inject 0.5 mLs into the skin once a week     Authorizing Provider: LUZ SARAH     Ordering User: [id number] [place], [place] - [address] [address] - P [phone] - F [phone]  [address] [facility] [address]  Phone: [phone] Fax: [phone]

## 2024-06-20 NOTE — TELEPHONE ENCOUNTER
Patient called requesting a refill     Rx- Tirzepatide (MOUNJARO) 5 MG/0.5ML SOPN SC injection    Pharmacy- [id number] - [place], [place] - [facility] [address]    LOV-  NOV- 7/16/24    Please advise

## 2024-07-14 LAB
A/G RATIO: 1.7 RATIO (ref 0.8–2.6)
ALBUMIN: 3.7 G/DL (ref 3.5–5.2)
ALP BLD-CCNC: 88 U/L (ref 23–144)
ALT SERPL-CCNC: 15 U/L (ref 0–60)
AST SERPL-CCNC: 21 U/L (ref 0–55)
BILIRUB SERPL-MCNC: 0.3 MG/DL (ref 0–1.2)
BUN / CREAT RATIO: 6 (ref 7–25)
BUN BLDV-MCNC: 25 MG/DL (ref 3–29)
CALCIUM SERPL-MCNC: 10 MG/DL (ref 8.5–10.5)
CHLORIDE BLD-SCNC: 102 MEQ/L (ref 96–110)
CHOLESTEROL, TOTAL: 120 MG/DL
CO2: 25 MEQ/L (ref 19–32)
CREAT SERPL-MCNC: 4.2 MG/DL (ref 0.5–1.2)
ESTIMATED GLOMERULAR FILTRATION RATE CREATININE EQUATION: 11 MLS/MIN/1.73M2
FASTING STATUS: ABNORMAL
GLOBULIN: 2.2 G/DL (ref 1.9–3.6)
GLUCOSE BLD-MCNC: 91 MG/DL (ref 70–99)
HBA1C MFR BLD: 6.1 % (ref 4–6)
HDLC SERPL-MCNC: 47 MG/DL
LDL CHOLESTEROL: 54 MG/DL
POTASSIUM SERPL-SCNC: 4.9 MEQ/L (ref 3.4–5.3)
SODIUM BLD-SCNC: 137 MEQ/L (ref 135–148)
T4 FREE: 1.45 NG/DL (ref 0.8–1.8)
TOTAL PROTEIN: 5.9 G/DL (ref 6–8.3)
TRIGL SERPL-MCNC: 95 MG/DL
TSH ULTRASENSITIVE: 0.98 MCIU/ML (ref 0.4–4.5)
VITAMIN D 25-HYDROXY: 41 NG/ML (ref 30–100)
VLDLC SERPL CALC-MCNC: 19 MG/DL (ref 4–38)

## 2024-07-16 ENCOUNTER — OFFICE VISIT (OUTPATIENT)
Dept: ENDOCRINOLOGY | Age: 69
End: 2024-07-16

## 2024-07-16 ENCOUNTER — TELEPHONE (OUTPATIENT)
Dept: ENDOCRINOLOGY | Age: 69
End: 2024-07-16

## 2024-07-16 VITALS
DIASTOLIC BLOOD PRESSURE: 62 MMHG | BODY MASS INDEX: 29.59 KG/M2 | RESPIRATION RATE: 14 BRPM | SYSTOLIC BLOOD PRESSURE: 117 MMHG | TEMPERATURE: 98 F | HEIGHT: 63 IN | HEART RATE: 76 BPM | WEIGHT: 167 LBS | OXYGEN SATURATION: 99 %

## 2024-07-16 DIAGNOSIS — E11.21 DIABETIC NEPHROPATHY ASSOCIATED WITH TYPE 2 DIABETES MELLITUS (HCC): ICD-10-CM

## 2024-07-16 DIAGNOSIS — E11.40 POORLY CONTROLLED TYPE 2 DIABETES MELLITUS WITH NEUROPATHY (HCC): Primary | ICD-10-CM

## 2024-07-16 DIAGNOSIS — E66.3 OVERWEIGHT (BMI 25.0-29.9): ICD-10-CM

## 2024-07-16 DIAGNOSIS — E55.9 VITAMIN D DEFICIENCY: ICD-10-CM

## 2024-07-16 DIAGNOSIS — I10 ESSENTIAL HYPERTENSION: ICD-10-CM

## 2024-07-16 DIAGNOSIS — E11.65 POORLY CONTROLLED TYPE 2 DIABETES MELLITUS WITH NEUROPATHY (HCC): Primary | ICD-10-CM

## 2024-07-16 DIAGNOSIS — E04.2 MULTINODULAR GOITER: ICD-10-CM

## 2024-07-16 DIAGNOSIS — E78.2 MIXED HYPERLIPIDEMIA: ICD-10-CM

## 2024-07-16 DIAGNOSIS — N18.5 CKD (CHRONIC KIDNEY DISEASE) STAGE 5, GFR LESS THAN 15 ML/MIN (HCC): ICD-10-CM

## 2024-07-16 RX ORDER — PATIROMER 8.4 G/1
8.4 POWDER, FOR SUSPENSION ORAL WEEKLY
COMMUNITY
Start: 2024-06-14

## 2024-07-16 RX ORDER — INSULIN ASPART 100 [IU]/ML
INJECTION, SOLUTION INTRAVENOUS; SUBCUTANEOUS
Qty: 60 ML | Refills: 3 | Status: SHIPPED | OUTPATIENT
Start: 2024-07-16

## 2024-07-16 NOTE — PROGRESS NOTES
lesions/pre-ulcerative calluses: absent  Edema: right- no, left- no     Sensory exam:  Monofilament sensation: normal  (minimum of 5 random plantar locations tested, avoiding callused areas - > 1 area with absence of sensation is + for neuropathy)     Plus at least one of the following:  Pulses: normal  Proprioception: Intact  Vibration (128 Hz): Impaired    ASSESSMENT/PLAN:    1. Type 2 diabetes mellitus, poorly controlled, with neuropathy (HCC)  Hemoglobin A1c 6.6-6.3-6.7-8.2-6.7-6.0-5.5-7.6-7.5-7.5-5.9-6.1  Novolog in pump.  Patient requires significantly less insulin.  Let's Jock 780/Guardian 4.  If smartquard is not functioning, settings:  Change basal rate to 0.4 u/h.  Change ICR to 20-25.  Change SF to 100.  Start correction at 130.  Continue Mounjaro  - Comprehensive Metabolic Panel; Future  - Hemoglobin A1C; Future     2. Mixed hyperlipidemia  Pravastatin 20 mg daily  Stop Fenofibrate 145 mg daily  LDL cholesterol 40-20--63--65-39-55-54  HDL 33-55-58  Triglycerides [phone]  Had muscle pain, leg cramping on statins  - Lipid Panel; Future     3. Obesity   Diet, activity as tolerated.     4. Essential hypertension  Current meds.     5. Vitamin D deficiency  25 hydroxy vitamin D 95-99-21-80-03-40-10-03-51-48-20-41  Calcium 10.0-10.0  Vitamin D 25 Hydroxy; Future  Vitamin D 5000 IU every day  Do not decrease the dose    6. CKD (chronic kidney disease) stage 5  Potassium 5.8, patient will contact Nephrology  Creatinine 4.4-4.3-4.7-4.3  GFR 10-11-10-11  Nephrology follow up.  On HD 3 times weekly     7.  Multinodular goiter  TSH 2.0-1.0-0.7-1.23-1.22-0.895-1.07-0.92  Monitor with thyroid sonogram, next in 12/2024  Patient consistently refuses biopsy.  She understands the risks.    5/2/2023  Right 2.2 cm, right 1.7 cm, left 2.7 cm nodules  Refused biopsy    6/21/2022  I recommended FNA of thyroid 3.8 cm and left 2.9 cm nodules.  Again, patient refused.  She understands risk of

## 2024-07-18 DIAGNOSIS — E11.40 POORLY CONTROLLED TYPE 2 DIABETES MELLITUS WITH NEUROPATHY (HCC): ICD-10-CM

## 2024-07-18 DIAGNOSIS — E11.65 POORLY CONTROLLED TYPE 2 DIABETES MELLITUS WITH NEUROPATHY (HCC): ICD-10-CM

## 2024-07-18 RX ORDER — TIRZEPATIDE 5 MG/.5ML
INJECTION, SOLUTION SUBCUTANEOUS
Qty: 2 ML | Refills: 0 | Status: SHIPPED | OUTPATIENT
Start: 2024-07-18

## 2024-07-28 LAB
CREATININE URINE: NORMAL MG/DL
MICROALBUMIN/CREAT 24H UR: NORMAL MCG/DL
MICROALBUMIN/CREAT UR-RTO: NORMAL MCG/MG CREAT.

## 2024-07-29 ENCOUNTER — TELEPHONE (OUTPATIENT)
Dept: ENDOCRINOLOGY | Age: 69
End: 2024-07-29

## 2024-07-29 NOTE — TELEPHONE ENCOUNTER
Per [employer] rep:    I finally got ahold of Comfort Weeks ([birth year]).  I am still working on her sensor issues.  I changed her manual settings with her - here is what I did (Dr. LIEBERMAN wants the details to put in her chart):  Basal: 12am to 12am: .2u/hr  ICR: 1u:50g  ISF: 1u:200mg/dL  AIT: 2 hours  Added low Alert at 70mg/dL and turned Suspend before low, ON.  I will follow up with her in a week to see if things are going OK with that.  Let me know if there are any questions.  Thank you!  Ronel

## 2024-08-20 ENCOUNTER — TELEPHONE (OUTPATIENT)
Dept: ENDOCRINOLOGY | Age: 69
End: 2024-08-20

## 2024-08-21 NOTE — TELEPHONE ENCOUNTER
Submitted PA for Mounjaro  Via [facility] Key: [id number] STATUS: PENDING.    Follow up done daily; if no decision with in three days we will refax.  If another three days goes by with no decision will call the insurance for status.

## 2024-08-22 DIAGNOSIS — E11.40 POORLY CONTROLLED TYPE 2 DIABETES MELLITUS WITH NEUROPATHY (HCC): ICD-10-CM

## 2024-08-22 DIAGNOSIS — E11.65 POORLY CONTROLLED TYPE 2 DIABETES MELLITUS WITH NEUROPATHY (HCC): ICD-10-CM

## 2024-08-22 RX ORDER — TIRZEPATIDE 7.5 MG/.5ML
7.5 INJECTION, SOLUTION SUBCUTANEOUS WEEKLY
Qty: 2 ML | Refills: 0 | Status: SHIPPED | OUTPATIENT
Start: 2024-08-22

## 2024-08-23 NOTE — TELEPHONE ENCOUNTER
The patient currently has access to the requested medication and a Prior Authorization is not needed for the patient/medication.     If this requires a response please respond to the pool ( P MHCX PSC MEDICATION PRE-AUTH).      Thank you please advise patient.

## 2024-09-20 ENCOUNTER — TELEPHONE (OUTPATIENT)
Dept: ENDOCRINOLOGY | Age: 69
End: 2024-09-20

## 2024-09-20 DIAGNOSIS — E11.40 POORLY CONTROLLED TYPE 2 DIABETES MELLITUS WITH NEUROPATHY (HCC): ICD-10-CM

## 2024-09-20 DIAGNOSIS — E11.65 POORLY CONTROLLED TYPE 2 DIABETES MELLITUS WITH NEUROPATHY (HCC): ICD-10-CM

## 2024-09-25 RX ORDER — TIRZEPATIDE 10 MG/.5ML
10 INJECTION, SOLUTION SUBCUTANEOUS WEEKLY
Qty: 2 ML | Refills: 0 | Status: SHIPPED | OUTPATIENT
Start: 2024-09-25

## 2024-10-10 ENCOUNTER — TELEPHONE (OUTPATIENT)
Dept: ENDOCRINOLOGY | Age: 69
End: 2024-10-10

## 2024-10-10 NOTE — TELEPHONE ENCOUNTER
PA requsted via [employer] by fax @ [phone] w/clinicals.   Reference # [id number] Pt mother called medline for refill

## 2024-10-10 NOTE — TELEPHONE ENCOUNTER
Spoke w/ pt for clarification. She said not to worry about it. It's something [facility] wanted to give us.

## 2024-10-10 NOTE — TELEPHONE ENCOUNTER
Pt called with a e script number use this number when sending in rx per patient       Npi [phone]

## 2024-10-17 DIAGNOSIS — E11.40 POORLY CONTROLLED TYPE 2 DIABETES MELLITUS WITH NEUROPATHY (HCC): ICD-10-CM

## 2024-10-17 DIAGNOSIS — E11.65 POORLY CONTROLLED TYPE 2 DIABETES MELLITUS WITH NEUROPATHY (HCC): ICD-10-CM

## 2024-10-17 RX ORDER — TIRZEPATIDE 10 MG/.5ML
INJECTION, SOLUTION SUBCUTANEOUS
Qty: 2 ML | Refills: 0 | Status: SHIPPED | OUTPATIENT
Start: 2024-10-17

## 2024-10-26 LAB
A/G RATIO: 1.5 RATIO (ref 0.8–2.6)
ALBUMIN: 3.8 G/DL (ref 3.5–5.2)
ALP BLD-CCNC: 147 U/L (ref 23–144)
ALT SERPL-CCNC: 20 U/L (ref 0–60)
AST SERPL-CCNC: 19 U/L (ref 0–55)
BILIRUB SERPL-MCNC: 0.3 MG/DL (ref 0–1.2)
BUN / CREAT RATIO: 4 (ref 7–25)
BUN BLDV-MCNC: 19 MG/DL (ref 3–29)
CALCIUM SERPL-MCNC: 10 MG/DL (ref 8.5–10.5)
CHLORIDE BLD-SCNC: 104 MEQ/L (ref 96–110)
CHOLESTEROL, TOTAL: 133 MG/DL
CO2: 26 MEQ/L (ref 19–32)
CREAT SERPL-MCNC: 4.4 MG/DL (ref 0.5–1.2)
ESTIMATED GLOMERULAR FILTRATION RATE CREATININE EQUATION: 10 MLS/MIN/1.73M2
FASTING STATUS: ABNORMAL
GLOBULIN: 2.5 G/DL (ref 1.9–3.6)
GLUCOSE BLD-MCNC: 122 MG/DL (ref 70–99)
HBA1C MFR BLD: 6.7 %
HDLC SERPL-MCNC: 45 MG/DL
LDL CHOLESTEROL: 45 MG/DL
POTASSIUM SERPL-SCNC: 5.1 MEQ/L (ref 3.4–5.3)
SODIUM BLD-SCNC: 137 MEQ/L (ref 135–148)
T4 FREE: 1.32 NG/DL (ref 0.8–1.8)
TOTAL PROTEIN: 6.3 G/DL (ref 6–8.3)
TRIGL SERPL-MCNC: 215 MG/DL
TSH ULTRASENSITIVE: 1.15 MCIU/ML (ref 0.4–4.5)
VITAMIN D 25-HYDROXY: 46 NG/ML (ref 30–100)
VLDLC SERPL CALC-MCNC: 43 MG/DL (ref 4–38)

## 2024-10-28 ENCOUNTER — OFFICE VISIT (OUTPATIENT)
Dept: ENDOCRINOLOGY | Age: 69
End: 2024-10-28

## 2024-10-28 ENCOUNTER — TELEPHONE (OUTPATIENT)
Dept: ENDOCRINOLOGY | Age: 69
End: 2024-10-28

## 2024-10-28 VITALS
OXYGEN SATURATION: 99 % | WEIGHT: 170 LBS | HEIGHT: 63 IN | HEART RATE: 86 BPM | DIASTOLIC BLOOD PRESSURE: 73 MMHG | TEMPERATURE: 98 F | RESPIRATION RATE: 14 BRPM | BODY MASS INDEX: 30.12 KG/M2 | SYSTOLIC BLOOD PRESSURE: 122 MMHG

## 2024-10-28 DIAGNOSIS — E11.21 DIABETIC NEPHROPATHY ASSOCIATED WITH TYPE 2 DIABETES MELLITUS (HCC): ICD-10-CM

## 2024-10-28 DIAGNOSIS — E11.40 TYPE 2 DIABETES, CONTROLLED, WITH NEUROPATHY (HCC): Primary | ICD-10-CM

## 2024-10-28 DIAGNOSIS — N18.5 CKD (CHRONIC KIDNEY DISEASE) STAGE 5, GFR LESS THAN 15 ML/MIN (HCC): ICD-10-CM

## 2024-10-28 DIAGNOSIS — I10 ESSENTIAL HYPERTENSION: ICD-10-CM

## 2024-10-28 DIAGNOSIS — E55.9 VITAMIN D DEFICIENCY: ICD-10-CM

## 2024-10-28 DIAGNOSIS — E66.3 OVERWEIGHT (BMI 25.0-29.9): ICD-10-CM

## 2024-10-28 DIAGNOSIS — E04.2 MULTINODULAR GOITER: ICD-10-CM

## 2024-10-28 DIAGNOSIS — E78.2 MIXED HYPERLIPIDEMIA: ICD-10-CM

## 2024-10-28 NOTE — PROGRESS NOTES
Almost Completely Solid (2 points)   Echogenicity: Hyperechoic or Isoechoic (1 point)   Shape: Wider than tall (0 points)   Margin: Ill-defined (0 points)   Echogenic foci: Macrocalcifications (1 point). . . .   TI-RADS: 4     Nodule 2   Location: Inferior right thyroid lobe   Size: 1.6 x 1.4 x 1.7 cm   Composition: Solid or Almost Completely Solid (2 points)   Echogenicity: Hyperechoic or Isoechoic (1 point)   Shape: Taller than wide (3 points)   Margin: Ill-defined (0 points)   Echogenic foci: None or Large Comet-tail Artifact (0 points). . / .   TI-RADS: 4       Nodule 3   Location: Inferior left thyroid lobe.   Size: 2.6 x 2.7 x 1.7 cm   Composition: Solid or Almost Completely Solid (2 points)   Echogenicity: Hyperechoic or Isoechoic (1 point)   Shape: Wider than tall (0 points)   Margin: Ill-defined (0 points)   Echogenic foci: None or Large Comet-tail Artifact (0 points). . / .   TI-RADS: 3       Previously, the nodule within the mid right thyroid lobe measured up to 2 cm in size of the   nodule within the inferior right thyroid lobe measured up to 2 cm in size. The nodule within   the left thyroid lobe       IMPRESSION:     Redemonstration of two TR-4 nodules within the right thyroid lobe, one of which appears mildly   increased in size while the other mildly decreased as compared with the prior exam from 2021,   possibly in part related to a difference in imaging technique. Additional TR-3 nodule within   the left thyroid lobe does not appear substantially changed. ACR guidelines continue to   recommend further evaluation of all 3 nodules with FNA if not previously performed.       DICTATED BY: CHAVEZ BLAKE M.D.            Past Medical History:   Diagnosis Date    Anemia     Dialysis patient (HCC)     Eye pressure     Hypertension     Kidney failure     Type 2 diabetes mellitus without complication (HCC)       Patient Active Problem List   Diagnosis    Type 2 diabetes, controlled, with neuropathy

## 2024-10-31 ENCOUNTER — TELEPHONE (OUTPATIENT)
Dept: ENDOCRINOLOGY | Age: 69
End: 2024-10-31

## 2024-10-31 NOTE — TELEPHONE ENCOUNTER
Please inform patient that radiologist stated that nodules are similar in appearance.  No new nodules were seen.  Radiologist did not give specific sizes.  Per radiology, stable exam.

## 2024-11-07 ENCOUNTER — TELEPHONE (OUTPATIENT)
Dept: ENDOCRINOLOGY | Age: 69
End: 2024-11-07

## 2024-12-03 ENCOUNTER — TELEPHONE (OUTPATIENT)
Dept: ENDOCRINOLOGY | Age: 69
End: 2024-12-03

## 2024-12-03 DIAGNOSIS — E11.65 POORLY CONTROLLED TYPE 2 DIABETES MELLITUS WITH NEUROPATHY (HCC): ICD-10-CM

## 2024-12-03 DIAGNOSIS — E11.40 POORLY CONTROLLED TYPE 2 DIABETES MELLITUS WITH NEUROPATHY (HCC): ICD-10-CM

## 2024-12-03 RX ORDER — TIRZEPATIDE 10 MG/.5ML
INJECTION, SOLUTION SUBCUTANEOUS
Qty: 2 ML | Refills: 1 | Status: SHIPPED | OUTPATIENT
Start: 2024-12-03

## 2024-12-03 NOTE — TELEPHONE ENCOUNTER
Patient called in and states she has not been able to get her Mounjaro 10 mg dose for the last 2 weeks. She stated [facility] just sent her a pen for the 2.5 mg dose but she has been taking the 10 mg dose. I looked in her chart and it looks like we have been sending her prescriptions to the [facility] pharmacy. Patient is going to check there to see if she can get her prescription filled. No other questions at this time.

## 2025-01-21 DIAGNOSIS — E11.65 POORLY CONTROLLED TYPE 2 DIABETES MELLITUS WITH NEUROPATHY (HCC): ICD-10-CM

## 2025-01-21 DIAGNOSIS — E11.40 POORLY CONTROLLED TYPE 2 DIABETES MELLITUS WITH NEUROPATHY (HCC): ICD-10-CM

## 2025-01-22 ENCOUNTER — TELEPHONE (OUTPATIENT)
Dept: ENDOCRINOLOGY | Age: 70
End: 2025-01-22

## 2025-01-22 DIAGNOSIS — E11.65 POORLY CONTROLLED TYPE 2 DIABETES MELLITUS WITH NEUROPATHY (HCC): ICD-10-CM

## 2025-01-22 DIAGNOSIS — E11.40 POORLY CONTROLLED TYPE 2 DIABETES MELLITUS WITH NEUROPATHY (HCC): ICD-10-CM

## 2025-01-22 RX ORDER — TIRZEPATIDE 10 MG/.5ML
INJECTION, SOLUTION SUBCUTANEOUS
Qty: 2 ML | Refills: 1 | Status: SHIPPED | OUTPATIENT
Start: 2025-01-22

## 2025-01-23 NOTE — TELEPHONE ENCOUNTER
Submitted PA for Mounjaro  Via [facility] Key: [id number] STATUS: PENDING.    Follow up done daily; if no decision with in three days we will refax.  If another three days goes by with no decision will call the insurance for status.

## 2025-01-29 RX ORDER — TIRZEPATIDE 10 MG/.5ML
10 INJECTION, SOLUTION SUBCUTANEOUS WEEKLY
Qty: 2 ML | Refills: 1 | Status: SHIPPED | OUTPATIENT
Start: 2025-01-29

## 2025-01-29 NOTE — TELEPHONE ENCOUNTER
Rx sent. Please advise on PA. Pts insurance updated. Not sure which insurance her previous pa last week was submitted to.

## 2025-01-29 NOTE — TELEPHONE ENCOUNTER
Call from pt stating that she has new insurance    I have updated pt's insurance to [facility]     Pt is awaiting refill for Mounjaro 10 mg

## 2025-01-31 NOTE — TELEPHONE ENCOUNTER
N/A [facility]  This medication or product is on your plan's list of covered drugs. Prior authorization is not required at this time. If your pharmacy has questions regarding the processing of your prescription, please have them call the [facility] pharmacy help desk at [phone].    If this requires a response please respond to the pool ( P MHCX PSC MEDICATION PRE-AUTH).      Thank you please advise patient.

## 2025-01-31 NOTE — TELEPHONE ENCOUNTER
Resubmitted to [facility]    Submitted PA for Mounjaro  Via [facility] Key: [id number] STATUS: PENDING.    Follow up done daily; if no decision with in three days we will refax.  If another three days goes by with no decision will call the insurance for status.

## 2025-02-20 ENCOUNTER — TELEPHONE (OUTPATIENT)
Dept: ENDOCRINOLOGY | Age: 70
End: 2025-02-20

## 2025-04-19 LAB
A/G RATIO: 1.5 RATIO (ref 0.8–2.6)
ALBUMIN: 3.7 G/DL (ref 3.5–5.2)
ALP BLD-CCNC: 116 U/L (ref 23–144)
ALT SERPL-CCNC: 12 U/L (ref 0–60)
AST SERPL-CCNC: 14 U/L (ref 0–55)
BILIRUB SERPL-MCNC: 0.2 MG/DL (ref 0–1.2)
BUN / CREAT RATIO: 4 (ref 7–25)
BUN BLDV-MCNC: 18 MG/DL (ref 3–29)
CALCIUM SERPL-MCNC: 9.4 MG/DL (ref 8.5–10.5)
CHLORIDE BLD-SCNC: 106 MEQ/L (ref 96–110)
CHOLESTEROL, TOTAL: 169 MG/DL
CO2: 25 MEQ/L (ref 19–32)
CREAT SERPL-MCNC: 4.1 MG/DL (ref 0.5–1.2)
CREATININE URINE: 86.1 MG/DL
ESTIMATED GLOMERULAR FILTRATION RATE CREATININE EQUATION: 11 MLS/MIN/1.73M2
FASTING STATUS: ABNORMAL
GLOBULIN: 2.5 G/DL (ref 1.9–3.6)
GLUCOSE BLD-MCNC: 106 MG/DL (ref 70–99)
HBA1C MFR BLD: 6.5 %
HDLC SERPL-MCNC: 47 MG/DL
LDL CHOLESTEROL: 85 MG/DL
MICROALBUMIN/CREAT 24H UR: ABNORMAL MCG/DL
MICROALBUMIN/CREAT UR-RTO: 205 MCG/MG CREAT.
POTASSIUM SERPL-SCNC: 5.6 MEQ/L (ref 3.4–5.3)
SODIUM BLD-SCNC: 139 MEQ/L (ref 135–148)
T4 FREE: 1.3 NG/DL (ref 0.8–1.8)
TOTAL PROTEIN: 6.2 G/DL (ref 6–8.3)
TRIGL SERPL-MCNC: 184 MG/DL
TSH ULTRASENSITIVE: 0.82 MCIU/ML (ref 0.4–4.5)
VITAMIN D 25-HYDROXY: 50 NG/ML (ref 30–100)
VLDLC SERPL CALC-MCNC: 37 MG/DL (ref 4–38)

## 2025-04-21 ENCOUNTER — TELEPHONE (OUTPATIENT)
Dept: ENDOCRINOLOGY | Age: 70
End: 2025-04-21

## 2025-04-25 ENCOUNTER — TELEPHONE (OUTPATIENT)
Dept: ENDOCRINOLOGY | Age: 70
End: 2025-04-25

## 2025-04-28 NOTE — TELEPHONE ENCOUNTER
Please advise patient/RN that this form needs to be filled by family doctor.  I only provide endocrine recommendations.  From endocrine/diabetes standpoint, okay to schedule.

## 2025-05-08 ENCOUNTER — OFFICE VISIT (OUTPATIENT)
Dept: ENDOCRINOLOGY | Age: 70
End: 2025-05-08

## 2025-05-08 VITALS
HEIGHT: 63 IN | HEART RATE: 82 BPM | SYSTOLIC BLOOD PRESSURE: 134 MMHG | WEIGHT: 165 LBS | DIASTOLIC BLOOD PRESSURE: 66 MMHG | TEMPERATURE: 98 F | RESPIRATION RATE: 14 BRPM | BODY MASS INDEX: 29.23 KG/M2 | OXYGEN SATURATION: 100 %

## 2025-05-08 DIAGNOSIS — E11.21 DIABETIC NEPHROPATHY ASSOCIATED WITH TYPE 2 DIABETES MELLITUS (HCC): ICD-10-CM

## 2025-05-08 DIAGNOSIS — E04.2 MULTINODULAR GOITER: ICD-10-CM

## 2025-05-08 DIAGNOSIS — E78.2 MIXED HYPERLIPIDEMIA: ICD-10-CM

## 2025-05-08 DIAGNOSIS — N18.5 CKD (CHRONIC KIDNEY DISEASE) STAGE 5, GFR LESS THAN 15 ML/MIN (HCC): ICD-10-CM

## 2025-05-08 DIAGNOSIS — E55.9 VITAMIN D DEFICIENCY: ICD-10-CM

## 2025-05-08 DIAGNOSIS — E66.811 CLASS 1 OBESITY WITH SERIOUS COMORBIDITY AND BODY MASS INDEX (BMI) OF 31.0 TO 31.9 IN ADULT, UNSPECIFIED OBESITY TYPE: ICD-10-CM

## 2025-05-08 DIAGNOSIS — E11.40 TYPE 2 DIABETES, CONTROLLED, WITH NEUROPATHY (HCC): Primary | ICD-10-CM

## 2025-05-08 DIAGNOSIS — I10 ESSENTIAL HYPERTENSION: ICD-10-CM

## 2025-05-08 RX ORDER — INSULIN ASPART 100 [IU]/ML
INJECTION, SOLUTION INTRAVENOUS; SUBCUTANEOUS
Qty: 30 ML | Refills: 3 | Status: SHIPPED | OUTPATIENT
Start: 2025-05-08

## 2025-05-08 RX ORDER — TIRZEPATIDE 10 MG/.5ML
10 INJECTION, SOLUTION SUBCUTANEOUS WEEKLY
Qty: 2 ML | Refills: 1 | Status: SHIPPED | OUTPATIENT
Start: 2025-05-08 | End: 2025-05-08 | Stop reason: SDUPTHER

## 2025-05-08 RX ORDER — TIRZEPATIDE 10 MG/.5ML
10 INJECTION, SOLUTION SUBCUTANEOUS WEEKLY
Qty: 6 ML | Refills: 1 | Status: SHIPPED | OUTPATIENT
Start: 2025-05-08

## 2025-05-08 NOTE — PROGRESS NOTES
equal, round, reactive to light  Ears/Nose: external inspection of ears and nose revealed no abnormalities, hearing is grossly normal  Oropharynx/Mouth/Face: lips, tongue and gums are normal with no lesions, the voice quality was normal  Neck: neck is supple and symmetric, with midline trachea and no masses, thyroid is normal  Lymphatics: normal cervical lymph nodes, normal supraclavicular nodes  Pulmonary: no increased work of breathing or signs of respiratory distress, lungs are clear to auscultation  Cardiovascular: normal heart rate and rhythm, normal S1 and S2, no murmurs and pedal pulses and 2+ bilaterally, no edema  Abdomen: abdomen is soft, non-tender with no masses  Musculoskeletal: normal gait and station and exam of the digits and nails are normal  Neurological: normal coordination and normal general cortical function    Visual inspection:  Deformity/amputation: present hammer toes  Skin lesions/pre-ulcerative calluses: absent  Edema: right- no, left- no     Sensory exam:  Monofilament sensation: normal  (minimum of 5 random plantar locations tested, avoiding callused areas - > 1 area with absence of sensation is + for neuropathy)     Plus at least one of the following:  Pulses: normal  Proprioception: Intact  Vibration (128 Hz): Impaired    ASSESSMENT/PLAN:    1. Type 2 diabetes mellitus, controlled, with neuropathy (HCC)  Hemoglobin A1c 6.6-6.3-6.7-8.2-6.7-6.0-5.5-7.6-7.5-7.5-5.9-6.1-6.7-6.5  C-peptide 4.67, glucose 112 in 2021  Novolog in pump.  Patient requires significantly less insulin.  [facility] 780/Guardian 4.   Continue Mounjaro  Will do a trial on Mounjaro only   - Comprehensive Metabolic Panel; Future  - Hemoglobin A1C; Future     2. Mixed hyperlipidemia  Consider adding Zetia, Vascepa or Lovaza  Pravastatin 20 mg daily  Stopped Fenofibrate 145 mg daily  LDL cholesterol 40-82--69--11-90-55-54-45-85  HDL 96-23-35-45-47  Triglycerides [phone]  Had muscle pain,

## 2025-05-12 ENCOUNTER — TELEPHONE (OUTPATIENT)
Dept: ENDOCRINOLOGY | Age: 70
End: 2025-05-12

## 2025-05-12 DIAGNOSIS — E11.65 POORLY CONTROLLED TYPE 2 DIABETES MELLITUS WITH NEUROPATHY (HCC): Primary | ICD-10-CM

## 2025-05-12 DIAGNOSIS — E11.40 POORLY CONTROLLED TYPE 2 DIABETES MELLITUS WITH NEUROPATHY (HCC): Primary | ICD-10-CM

## 2025-05-12 NOTE — TELEPHONE ENCOUNTER
Received PA request for Novolog. Pt has medicare and uses an insulin pump. Medicare guidelines require insulin to be billed as a medical supply. Please have pharmacy bill to part b. Thanks!    If this requires a response please respond to the pool ( P MHCX PSC MEDICATION PRE-AUTH).      Thank you please advise patient.

## 2025-05-13 NOTE — TELEPHONE ENCOUNTER
Spoke w/ pharmacist. They are stating that pt needs a PA.    Need to call [facility] Medicare Dept at [phone]

## 2025-05-16 NOTE — TELEPHONE ENCOUNTER
Spoke w/ [place] [employer]are dept, they stated that even though pt is using a pump, since she has a medicare advantage plan, it must be billed through [facility] instead of medicare. Please submit PA, thanks!

## 2025-05-16 NOTE — TELEPHONE ENCOUNTER
Submitted PA for Novolog  Via [facility] Key: [id number] STATUS: PENDING.    Follow up done daily; if no decision with in three days we will refax.  If another three days goes by with no decision will call the insurance for status.

## 2025-05-20 NOTE — TELEPHONE ENCOUNTER
Could you please explain to me why NovoLog was denied.  Patient is on insulin pump as requested with a frequency checking Blood Glucose 4 times a day documented in my note and also using continuous glucose monitor.  If NovoLog is not covered, which insulin is covered then?  Please clarify and submit required information.  Let patient know if another insulin is covered.

## 2025-05-21 ENCOUNTER — TELEPHONE (OUTPATIENT)
Dept: ENDOCRINOLOGY | Age: 70
End: 2025-05-21

## 2025-05-21 NOTE — TELEPHONE ENCOUNTER
Spoke w/ pt, she has been on insulin pump for a while. At least before 2018 to our records/downloads.    She started medicare at 64 yo. (Just 4 years ago).    Lvm to [employer] rep to ask for clarification on exact time pt started on pump    Are you able to appeal with this? Or do we need it addended in her LOV?

## 2025-05-22 RX ORDER — INSULIN LISPRO 100 [IU]/ML
INJECTION, SOLUTION INTRAVENOUS; SUBCUTANEOUS
Qty: 30 ML | Refills: 3 | Status: SHIPPED | OUTPATIENT
Start: 2025-05-22

## 2025-05-22 NOTE — TELEPHONE ENCOUNTER
Pt called back, gave pt verbatim message.    Pt stated that she would like to have Humalog sent to the pharmacy in place of the novolog       Pt is using [facility] pharmacy

## 2025-05-22 NOTE — TELEPHONE ENCOUNTER
Insurance states:    The following alternative is the preferred alternative: HUMALOG OR INSULIN LISPRO OR LYUMJEV.     Can the pt be switched? Please advise. Thanks!    If this requires a response please respond to the pool ( P MHCX PSC MEDICATION PRE-AUTH).      Thank you please advise patient.

## 2025-05-22 NOTE — TELEPHONE ENCOUNTER
LVM to return call    Insurance states:     The following alternative is the preferred alternative: HUMALOG OR INSULIN LISPRO OR LYUMJEV.      Can the pt be switched?

## 2025-05-22 NOTE — TELEPHONE ENCOUNTER
Pt called back with an email address and fax# for Tradyo     Email - [email or web address]  Fax# - [phone]

## 2025-08-11 DIAGNOSIS — E11.40 TYPE 2 DIABETES, CONTROLLED, WITH NEUROPATHY (HCC): ICD-10-CM

## 2025-08-11 RX ORDER — TIRZEPATIDE 10 MG/.5ML
INJECTION, SOLUTION SUBCUTANEOUS
Qty: 2 ML | Refills: 0 | Status: SHIPPED | OUTPATIENT
Start: 2025-08-11

## 2025-09-06 LAB
A/G RATIO: 1.8 RATIO (ref 0.8–2.6)
ALBUMIN: 4 G/DL (ref 3.5–5.2)
ALP BLD-CCNC: 137 U/L (ref 23–144)
ALT SERPL-CCNC: 17 U/L (ref 0–60)
AST SERPL-CCNC: 18 U/L (ref 0–55)
BILIRUB SERPL-MCNC: 0.3 MG/DL (ref 0–1.2)
BUN / CREAT RATIO: 5 (ref 7–25)
BUN BLDV-MCNC: 26 MG/DL (ref 3–29)
C-PEPTIDE: 4.58 NG/ML (ref 0.8–3.1)
CALCIUM SERPL-MCNC: 9.6 MG/DL (ref 8.5–10.5)
CHLORIDE BLD-SCNC: 103 MEQ/L (ref 96–110)
CHOLESTEROL, TOTAL: 163 MG/DL
CO2: 23 MEQ/L (ref 19–32)
CREAT SERPL-MCNC: 5.4 MG/DL (ref 0.5–1.2)
ESTIMATED GLOMERULAR FILTRATION RATE CREATININE EQUATION: 8 MLS/MIN/1.73M2
FASTING STATUS: ABNORMAL
GLOBULIN: 2.2 G/DL (ref 1.9–3.6)
GLUCOSE BLD-MCNC: 102 MG/DL (ref 70–99)
HBA1C MFR BLD: 6 %
HDLC SERPL-MCNC: 48 MG/DL
LDL CHOLESTEROL: 80 MG/DL
POTASSIUM SERPL-SCNC: 5.1 MEQ/L (ref 3.4–5.3)
SODIUM BLD-SCNC: 136 MEQ/L (ref 135–148)
TOTAL PROTEIN: 6.2 G/DL (ref 6–8.3)
TRIGL SERPL-MCNC: 174 MG/DL
VITAMIN D 25-HYDROXY: 58 NG/ML (ref 30–100)
VLDLC SERPL CALC-MCNC: 35 MG/DL (ref 4–38)

## (undated) DEVICE — DRESSING,GAUZE,XEROFORM,CURAD,1"X8",ST: Brand: CURAD

## (undated) DEVICE — ZIMMER® STERILE DISPOSABLE TOURNIQUET CUFF WITH PLC, DUAL PORT, SINGLE BLADDER, 12 IN. (30 CM)

## (undated) DEVICE — CORD,CAUTERY,BIPOLAR,STERILE: Brand: MEDLINE

## (undated) DEVICE — BANDAGE COMPR W4INXL5YD BGE HI E W/ REM CLP SURE-WRAP

## (undated) DEVICE — TOWEL,OR,DSP,ST,BLUE,STD,4/PK,20PK/CS: Brand: MEDLINE

## (undated) DEVICE — DRAPE HND W114XL142IN BLU POLYPR W O PCH FEN CRD AND TB HLDR

## (undated) DEVICE — GLOVE SURG SZ 65 THK91MIL LTX FREE SYN POLYISOPRENE

## (undated) DEVICE — SUTURE ETHLN SZ 4-0 L18IN NONABSORBABLE BLK L19MM PS-2 3/8 1667H

## (undated) DEVICE — PACK PROCEDURE SURG EXTREMITY MFFOP CUST

## (undated) DEVICE — BANDAGE COMPR W4INXL12FT E DISP ESMARCH EVEN

## (undated) DEVICE — HYPODERMIC SAFETY NEEDLE: Brand: MAGELLAN

## (undated) DEVICE — SUTURE MCRYL + SZ 4-0 L27IN ABSRB UD L19MM PS-2 3/8 CIR MCP426H

## (undated) DEVICE — GLOVE ORTHO 8   MSG9480

## (undated) DEVICE — MASTISOL ADHESIVE LIQ 2/3ML

## (undated) DEVICE — SOLUTION IRRIG 500ML 0.9% SOD CHL USP POUR PLAS BTL

## (undated) DEVICE — BANDAGE,ELASTIC,ESMARK,STERILE,4"X9',LF: Brand: MEDLINE

## (undated) DEVICE — MEDICINE CUP, GRADUATED, STER: Brand: MEDLINE

## (undated) DEVICE — MASC TURNOVER KIT: Brand: MEDLINE INDUSTRIES, INC.

## (undated) DEVICE — INTENDED FOR TISSUE SEPARATION, AND OTHER PROCEDURES THAT REQUIRE A SHARP SURGICAL BLADE TO PUNCTURE OR CUT.: Brand: BARD-PARKER ® STAINLESS STEEL BLADES

## (undated) DEVICE — SOLUTION IRRIG 500ML 0.9% SOD CHLO USP POUR PLAS BTL

## (undated) DEVICE — GAUZE,SPONGE,4"X4",8PLY,STRL,LF,10/TRAY: Brand: MEDLINE

## (undated) DEVICE — GLOVE SURG SZ 65 L12IN THK75MIL DK GRN LTX FREE

## (undated) DEVICE — GLOVE SURG SZ 65 L12IN FNGR THK79MIL GRN LTX FREE

## (undated) DEVICE — 3M™ STERI-STRIP™ REINFORCED ADHESIVE SKIN CLOSURES, R1547, 1/2 IN X 4 IN (12 MM X 100 MM), 6 STRIPS/ENVELOPE: Brand: 3M™ STERI-STRIP™

## (undated) DEVICE — APPLICATOR MEDICATED 26 CC SOLUTION HI LT ORNG CHLORAPREP

## (undated) DEVICE — NEEDLE,22GX1.5",REG,BEVEL: Brand: MEDLINE

## (undated) DEVICE — NEEDLE HYPO 22GA L1.5IN BLK POLYPR HUB S STL REG BVL STR

## (undated) DEVICE — GLOVE ORANGE PI 8   MSG9080

## (undated) DEVICE — BANDAGE GZ W2XL75IN ST RAYON POLY CNFRM STRTCH LTWT

## (undated) DEVICE — PADDING CAST W4INXL4YD ST COT RAYON MICROPLEATED HIGHLY

## (undated) DEVICE — ELECTRODE PT RET AD L9FT HI MOIST COND ADH HYDRGEL CORDED

## (undated) DEVICE — SUTURE VCRL + SZ 3-0 L18IN ABSRB UD SH 1/2 CIR TAPERCUT NDL VCP864D